# Patient Record
Sex: FEMALE | Race: WHITE | NOT HISPANIC OR LATINO | Employment: OTHER | URBAN - METROPOLITAN AREA
[De-identification: names, ages, dates, MRNs, and addresses within clinical notes are randomized per-mention and may not be internally consistent; named-entity substitution may affect disease eponyms.]

---

## 2024-02-21 ENCOUNTER — APPOINTMENT (OUTPATIENT)
Dept: PRIMARY CARE | Facility: CLINIC | Age: 74
End: 2024-02-21
Payer: COMMERCIAL

## 2024-10-31 ENCOUNTER — APPOINTMENT (OUTPATIENT)
Dept: NEUROLOGY | Facility: CLINIC | Age: 74
End: 2024-10-31
Payer: COMMERCIAL

## 2025-01-19 ENCOUNTER — APPOINTMENT (OUTPATIENT)
Dept: CARDIOLOGY | Facility: HOSPITAL | Age: 75
DRG: 234 | End: 2025-01-19
Payer: MEDICARE

## 2025-01-19 PROCEDURE — 93005 ELECTROCARDIOGRAM TRACING: CPT

## 2025-01-19 PROCEDURE — 99285 EMERGENCY DEPT VISIT HI MDM: CPT | Performed by: STUDENT IN AN ORGANIZED HEALTH CARE EDUCATION/TRAINING PROGRAM

## 2025-01-19 ASSESSMENT — PAIN DESCRIPTION - ORIENTATION: ORIENTATION: MID

## 2025-01-19 ASSESSMENT — PAIN DESCRIPTION - FREQUENCY: FREQUENCY: INTERMITTENT

## 2025-01-19 ASSESSMENT — PAIN DESCRIPTION - PAIN TYPE: TYPE: ACUTE PAIN

## 2025-01-19 ASSESSMENT — PAIN DESCRIPTION - LOCATION: LOCATION: CHEST

## 2025-01-19 ASSESSMENT — PAIN - FUNCTIONAL ASSESSMENT: PAIN_FUNCTIONAL_ASSESSMENT: 0-10

## 2025-01-19 ASSESSMENT — COLUMBIA-SUICIDE SEVERITY RATING SCALE - C-SSRS
6. HAVE YOU EVER DONE ANYTHING, STARTED TO DO ANYTHING, OR PREPARED TO DO ANYTHING TO END YOUR LIFE?: NO
2. HAVE YOU ACTUALLY HAD ANY THOUGHTS OF KILLING YOURSELF?: NO
1. IN THE PAST MONTH, HAVE YOU WISHED YOU WERE DEAD OR WISHED YOU COULD GO TO SLEEP AND NOT WAKE UP?: NO

## 2025-01-19 ASSESSMENT — PAIN SCALES - GENERAL: PAINLEVEL_OUTOF10: 7

## 2025-01-19 ASSESSMENT — PAIN DESCRIPTION - DESCRIPTORS: DESCRIPTORS: DISCOMFORT

## 2025-01-20 ENCOUNTER — APPOINTMENT (OUTPATIENT)
Dept: CARDIOLOGY | Facility: HOSPITAL | Age: 75
DRG: 234 | End: 2025-01-20
Payer: MEDICARE

## 2025-01-20 ENCOUNTER — APPOINTMENT (OUTPATIENT)
Dept: RADIOLOGY | Facility: HOSPITAL | Age: 75
DRG: 234 | End: 2025-01-20
Payer: MEDICARE

## 2025-01-20 ENCOUNTER — HOSPITAL ENCOUNTER (INPATIENT)
Facility: HOSPITAL | Age: 75
DRG: 234 | End: 2025-01-20
Attending: STUDENT IN AN ORGANIZED HEALTH CARE EDUCATION/TRAINING PROGRAM | Admitting: INTERNAL MEDICINE
Payer: MEDICARE

## 2025-01-20 DIAGNOSIS — I25.110 ATHEROSCLEROTIC HEART DISEASE OF NATIVE CORONARY ARTERY WITH UNSTABLE ANGINA PECTORIS: ICD-10-CM

## 2025-01-20 DIAGNOSIS — I25.84 CORONARY ATHEROSCLEROSIS DUE TO CALCIFIED CORONARY LESION (CODE): ICD-10-CM

## 2025-01-20 DIAGNOSIS — I74.2 EMBOLISM AND THROMBOSIS OF ARTERIES OF THE UPPER EXTREMITIES (MULTI): ICD-10-CM

## 2025-01-20 DIAGNOSIS — I25.701 ATHEROSCLEROSIS OF CORONARY ARTERY BYPASS GRAFT(S), UNSPECIFIED, WITH ANGINA PECTORIS WITH DOCUMENTED SPASM (CMS-HCC): ICD-10-CM

## 2025-01-20 DIAGNOSIS — I24.9 ACS (ACUTE CORONARY SYNDROME) (MULTI): ICD-10-CM

## 2025-01-20 DIAGNOSIS — I21.4 NSTEMI (NON-ST ELEVATED MYOCARDIAL INFARCTION) (MULTI): ICD-10-CM

## 2025-01-20 DIAGNOSIS — Z01.810 ENCOUNTER FOR PREPROCEDURAL CARDIOVASCULAR EXAMINATION: ICD-10-CM

## 2025-01-20 DIAGNOSIS — I65.23 OCCLUSION AND STENOSIS OF BILATERAL CAROTID ARTERIES: ICD-10-CM

## 2025-01-20 DIAGNOSIS — R07.89 OTHER CHEST PAIN: ICD-10-CM

## 2025-01-20 DIAGNOSIS — Z95.1 S/P CABG X 4: Primary | ICD-10-CM

## 2025-01-20 DIAGNOSIS — Z01.818 ENCOUNTER FOR OTHER PREPROCEDURAL EXAMINATION: ICD-10-CM

## 2025-01-20 DIAGNOSIS — R07.9 CHEST PAIN, UNSPECIFIED TYPE: ICD-10-CM

## 2025-01-20 LAB
ALBUMIN SERPL BCP-MCNC: 4.4 G/DL (ref 3.4–5)
ALP SERPL-CCNC: 89 U/L (ref 33–136)
ALT SERPL W P-5'-P-CCNC: 16 U/L (ref 7–45)
ANION GAP SERPL CALC-SCNC: 12 MMOL/L (ref 10–20)
AORTIC VALVE MEAN GRADIENT: 8 MMHG
AORTIC VALVE PEAK VELOCITY: 1.74 M/S
APTT PPP: 33 SECONDS (ref 27–38)
AST SERPL W P-5'-P-CCNC: 17 U/L (ref 9–39)
ATRIAL RATE: 80 BPM
AV PEAK GRADIENT: 12 MMHG
AVA (PEAK VEL): 1.97 CM2
AVA (VTI): 2.03 CM2
BASOPHILS # BLD AUTO: 0.02 X10*3/UL (ref 0–0.1)
BASOPHILS NFR BLD AUTO: 0.2 %
BILIRUB SERPL-MCNC: 0.2 MG/DL (ref 0–1.2)
BUN SERPL-MCNC: 21 MG/DL (ref 6–23)
CALCIUM SERPL-MCNC: 9.9 MG/DL (ref 8.6–10.3)
CARDIAC TROPONIN I PNL SERPL HS: 16 NG/L (ref 0–13)
CARDIAC TROPONIN I PNL SERPL HS: 16 NG/L (ref 0–13)
CARDIAC TROPONIN I PNL SERPL HS: 32 NG/L (ref 0–13)
CHLORIDE SERPL-SCNC: 103 MMOL/L (ref 98–107)
CHOLEST SERPL-MCNC: 398 MG/DL (ref 0–199)
CHOLESTEROL/HDL RATIO: 6.4
CO2 SERPL-SCNC: 27 MMOL/L (ref 21–32)
CREAT SERPL-MCNC: 0.86 MG/DL (ref 0.5–1.05)
D DIMER PPP FEU-MCNC: 717 NG/ML FEU
EGFRCR SERPLBLD CKD-EPI 2021: 72 ML/MIN/1.73M*2
EJECTION FRACTION APICAL 4 CHAMBER: 55.7
EJECTION FRACTION: 58 %
EOSINOPHIL # BLD AUTO: 0.16 X10*3/UL (ref 0–0.4)
EOSINOPHIL NFR BLD AUTO: 1.5 %
ERYTHROCYTE [DISTWIDTH] IN BLOOD BY AUTOMATED COUNT: 13.7 % (ref 11.5–14.5)
EST. AVERAGE GLUCOSE BLD GHB EST-MCNC: 120 MG/DL
GLUCOSE SERPL-MCNC: 118 MG/DL (ref 74–99)
HBA1C MFR BLD: 5.8 %
HCT VFR BLD AUTO: 47 % (ref 36–46)
HDLC SERPL-MCNC: 62.3 MG/DL
HGB BLD-MCNC: 15.2 G/DL (ref 12–16)
IMM GRANULOCYTES # BLD AUTO: 0.02 X10*3/UL (ref 0–0.5)
IMM GRANULOCYTES NFR BLD AUTO: 0.2 % (ref 0–0.9)
INR PPP: 1 (ref 0.9–1.1)
LDLC SERPL CALC-MCNC: 285 MG/DL
LEFT ATRIUM VOLUME AREA LENGTH INDEX BSA: 22.1 ML/M2
LEFT VENTRICLE INTERNAL DIMENSION DIASTOLE: 4 CM (ref 3.5–6)
LEFT VENTRICULAR OUTFLOW TRACT DIAMETER: 2 CM
LYMPHOCYTES # BLD AUTO: 4.18 X10*3/UL (ref 0.8–3)
LYMPHOCYTES NFR BLD AUTO: 38.3 %
MAGNESIUM SERPL-MCNC: 2.12 MG/DL (ref 1.6–2.4)
MCH RBC QN AUTO: 29.1 PG (ref 26–34)
MCHC RBC AUTO-ENTMCNC: 32.3 G/DL (ref 32–36)
MCV RBC AUTO: 90 FL (ref 80–100)
MITRAL VALVE E/A RATIO: 1.07
MONOCYTES # BLD AUTO: 0.76 X10*3/UL (ref 0.05–0.8)
MONOCYTES NFR BLD AUTO: 7 %
NEUTROPHILS # BLD AUTO: 5.77 X10*3/UL (ref 1.6–5.5)
NEUTROPHILS NFR BLD AUTO: 52.8 %
NON HDL CHOLESTEROL: 336 MG/DL (ref 0–149)
NRBC BLD-RTO: 0 /100 WBCS (ref 0–0)
P AXIS: 56 DEGREES
P OFFSET: 191 MS
P ONSET: 123 MS
PLATELET # BLD AUTO: 320 X10*3/UL (ref 150–450)
POTASSIUM SERPL-SCNC: 4.2 MMOL/L (ref 3.5–5.3)
PR INTERVAL: 190 MS
PROT SERPL-MCNC: 8.3 G/DL (ref 6.4–8.2)
PROTHROMBIN TIME: 11.3 SECONDS (ref 9.8–12.8)
Q ONSET: 218 MS
QRS COUNT: 13 BEATS
QRS DURATION: 80 MS
QT INTERVAL: 362 MS
QTC CALCULATION(BAZETT): 417 MS
QTC FREDERICIA: 398 MS
R AXIS: 15 DEGREES
RBC # BLD AUTO: 5.23 X10*6/UL (ref 4–5.2)
RIGHT VENTRICLE FREE WALL PEAK S': 11.3 CM/S
RIGHT VENTRICLE PEAK SYSTOLIC PRESSURE: 11.3 MMHG
SODIUM SERPL-SCNC: 138 MMOL/L (ref 136–145)
T AXIS: 39 DEGREES
T OFFSET: 399 MS
TRICUSPID ANNULAR PLANE SYSTOLIC EXCURSION: 2.3 CM
TRIGL SERPL-MCNC: 253 MG/DL (ref 0–149)
TSH SERPL-ACNC: 7.9 MIU/L (ref 0.44–3.98)
VENTRICULAR RATE: 80 BPM
VLDL: 51 MG/DL (ref 0–40)
WBC # BLD AUTO: 10.9 X10*3/UL (ref 4.4–11.3)

## 2025-01-20 PROCEDURE — 85610 PROTHROMBIN TIME: CPT | Performed by: STUDENT IN AN ORGANIZED HEALTH CARE EDUCATION/TRAINING PROGRAM

## 2025-01-20 PROCEDURE — 71045 X-RAY EXAM CHEST 1 VIEW: CPT | Mod: FOREIGN READ | Performed by: RADIOLOGY

## 2025-01-20 PROCEDURE — 99222 1ST HOSP IP/OBS MODERATE 55: CPT | Performed by: NURSE PRACTITIONER

## 2025-01-20 PROCEDURE — 71275 CT ANGIOGRAPHY CHEST: CPT | Mod: FOREIGN READ | Performed by: RADIOLOGY

## 2025-01-20 PROCEDURE — 85379 FIBRIN DEGRADATION QUANT: CPT | Performed by: STUDENT IN AN ORGANIZED HEALTH CARE EDUCATION/TRAINING PROGRAM

## 2025-01-20 PROCEDURE — 71275 CT ANGIOGRAPHY CHEST: CPT

## 2025-01-20 PROCEDURE — 84484 ASSAY OF TROPONIN QUANT: CPT | Performed by: NURSE PRACTITIONER

## 2025-01-20 PROCEDURE — 84075 ASSAY ALKALINE PHOSPHATASE: CPT | Performed by: STUDENT IN AN ORGANIZED HEALTH CARE EDUCATION/TRAINING PROGRAM

## 2025-01-20 PROCEDURE — 84443 ASSAY THYROID STIM HORMONE: CPT | Performed by: NURSE PRACTITIONER

## 2025-01-20 PROCEDURE — 84484 ASSAY OF TROPONIN QUANT: CPT | Performed by: STUDENT IN AN ORGANIZED HEALTH CARE EDUCATION/TRAINING PROGRAM

## 2025-01-20 PROCEDURE — 93306 TTE W/DOPPLER COMPLETE: CPT

## 2025-01-20 PROCEDURE — G0378 HOSPITAL OBSERVATION PER HR: HCPCS

## 2025-01-20 PROCEDURE — 93005 ELECTROCARDIOGRAM TRACING: CPT

## 2025-01-20 PROCEDURE — 2500000001 HC RX 250 WO HCPCS SELF ADMINISTERED DRUGS (ALT 637 FOR MEDICARE OP): Performed by: NURSE PRACTITIONER

## 2025-01-20 PROCEDURE — 80061 LIPID PANEL: CPT | Performed by: NURSE PRACTITIONER

## 2025-01-20 PROCEDURE — 36415 COLL VENOUS BLD VENIPUNCTURE: CPT | Performed by: STUDENT IN AN ORGANIZED HEALTH CARE EDUCATION/TRAINING PROGRAM

## 2025-01-20 PROCEDURE — 85025 COMPLETE CBC W/AUTO DIFF WBC: CPT | Performed by: STUDENT IN AN ORGANIZED HEALTH CARE EDUCATION/TRAINING PROGRAM

## 2025-01-20 PROCEDURE — 2550000001 HC RX 255 CONTRASTS: Performed by: STUDENT IN AN ORGANIZED HEALTH CARE EDUCATION/TRAINING PROGRAM

## 2025-01-20 PROCEDURE — 2500000001 HC RX 250 WO HCPCS SELF ADMINISTERED DRUGS (ALT 637 FOR MEDICARE OP): Performed by: INTERNAL MEDICINE

## 2025-01-20 PROCEDURE — 83735 ASSAY OF MAGNESIUM: CPT | Performed by: STUDENT IN AN ORGANIZED HEALTH CARE EDUCATION/TRAINING PROGRAM

## 2025-01-20 PROCEDURE — 83036 HEMOGLOBIN GLYCOSYLATED A1C: CPT | Performed by: NURSE PRACTITIONER

## 2025-01-20 PROCEDURE — 93306 TTE W/DOPPLER COMPLETE: CPT | Performed by: STUDENT IN AN ORGANIZED HEALTH CARE EDUCATION/TRAINING PROGRAM

## 2025-01-20 PROCEDURE — 71045 X-RAY EXAM CHEST 1 VIEW: CPT

## 2025-01-20 RX ORDER — LEVOTHYROXINE SODIUM 50 UG/1
50 TABLET ORAL DAILY
COMMUNITY

## 2025-01-20 RX ORDER — GABAPENTIN 100 MG/1
100 CAPSULE ORAL NIGHTLY
COMMUNITY

## 2025-01-20 RX ORDER — NAPROXEN SODIUM 220 MG/1
324 TABLET, FILM COATED ORAL ONCE
Status: COMPLETED | OUTPATIENT
Start: 2025-01-20 | End: 2025-01-20

## 2025-01-20 RX ORDER — METOPROLOL TARTRATE 50 MG/1
50 TABLET ORAL 2 TIMES DAILY
Status: DISCONTINUED | OUTPATIENT
Start: 2025-01-20 | End: 2025-01-24

## 2025-01-20 RX ORDER — ACETAMINOPHEN 160 MG/5ML
650 SOLUTION ORAL EVERY 4 HOURS PRN
Status: DISCONTINUED | OUTPATIENT
Start: 2025-01-20 | End: 2025-01-24

## 2025-01-20 RX ORDER — NAPROXEN SODIUM 220 MG/1
81 TABLET, FILM COATED ORAL DAILY
Status: DISCONTINUED | OUTPATIENT
Start: 2025-01-21 | End: 2025-01-21

## 2025-01-20 RX ORDER — ACETAMINOPHEN 650 MG/1
650 SUPPOSITORY RECTAL EVERY 4 HOURS PRN
Status: DISCONTINUED | OUTPATIENT
Start: 2025-01-20 | End: 2025-01-24

## 2025-01-20 RX ORDER — LOVASTATIN 40 MG/1
1 TABLET ORAL DAILY
COMMUNITY

## 2025-01-20 RX ORDER — ACETAMINOPHEN 325 MG/1
650 TABLET ORAL EVERY 4 HOURS PRN
Status: DISCONTINUED | OUTPATIENT
Start: 2025-01-20 | End: 2025-01-24

## 2025-01-20 RX ORDER — METOPROLOL TARTRATE 50 MG/1
50 TABLET ORAL 2 TIMES DAILY
COMMUNITY
End: 2025-01-29 | Stop reason: HOSPADM

## 2025-01-20 RX ORDER — ENOXAPARIN SODIUM 100 MG/ML
40 INJECTION SUBCUTANEOUS EVERY 24 HOURS
Status: DISCONTINUED | OUTPATIENT
Start: 2025-01-20 | End: 2025-01-23

## 2025-01-20 RX ADMIN — METOPROLOL TARTRATE 50 MG: 50 TABLET, FILM COATED ORAL at 11:09

## 2025-01-20 RX ADMIN — ASPIRIN 81 MG CHEWABLE TABLET 324 MG: 81 TABLET CHEWABLE at 09:48

## 2025-01-20 RX ADMIN — IOHEXOL 75 ML: 350 INJECTION, SOLUTION INTRAVENOUS at 03:32

## 2025-01-20 RX ADMIN — METOPROLOL TARTRATE 50 MG: 50 TABLET, FILM COATED ORAL at 20:34

## 2025-01-20 SDOH — SOCIAL STABILITY: SOCIAL INSECURITY: WITHIN THE LAST YEAR, HAVE YOU BEEN HUMILIATED OR EMOTIONALLY ABUSED IN OTHER WAYS BY YOUR PARTNER OR EX-PARTNER?: NO

## 2025-01-20 SDOH — SOCIAL STABILITY: SOCIAL INSECURITY: HAVE YOU HAD ANY THOUGHTS OF HARMING ANYONE ELSE?: NO

## 2025-01-20 SDOH — SOCIAL STABILITY: SOCIAL INSECURITY: ABUSE: ADULT

## 2025-01-20 SDOH — SOCIAL STABILITY: SOCIAL INSECURITY
WITHIN THE LAST YEAR, HAVE YOU BEEN RAPED OR FORCED TO HAVE ANY KIND OF SEXUAL ACTIVITY BY YOUR PARTNER OR EX-PARTNER?: NO

## 2025-01-20 SDOH — SOCIAL STABILITY: SOCIAL INSECURITY: DOES ANYONE TRY TO KEEP YOU FROM HAVING/CONTACTING OTHER FRIENDS OR DOING THINGS OUTSIDE YOUR HOME?: NO

## 2025-01-20 SDOH — SOCIAL STABILITY: SOCIAL INSECURITY
WITHIN THE LAST YEAR, HAVE YOU BEEN KICKED, HIT, SLAPPED, OR OTHERWISE PHYSICALLY HURT BY YOUR PARTNER OR EX-PARTNER?: NO

## 2025-01-20 SDOH — SOCIAL STABILITY: SOCIAL INSECURITY: DO YOU FEEL UNSAFE GOING BACK TO THE PLACE WHERE YOU ARE LIVING?: NO

## 2025-01-20 SDOH — SOCIAL STABILITY: SOCIAL INSECURITY: ARE THERE ANY APPARENT SIGNS OF INJURIES/BEHAVIORS THAT COULD BE RELATED TO ABUSE/NEGLECT?: NO

## 2025-01-20 SDOH — SOCIAL STABILITY: SOCIAL INSECURITY: ARE YOU OR HAVE YOU BEEN THREATENED OR ABUSED PHYSICALLY, EMOTIONALLY, OR SEXUALLY BY ANYONE?: NO

## 2025-01-20 SDOH — ECONOMIC STABILITY: INCOME INSECURITY: IN THE PAST 12 MONTHS HAS THE ELECTRIC, GAS, OIL, OR WATER COMPANY THREATENED TO SHUT OFF SERVICES IN YOUR HOME?: NO

## 2025-01-20 SDOH — SOCIAL STABILITY: SOCIAL INSECURITY: WERE YOU ABLE TO COMPLETE ALL THE BEHAVIORAL HEALTH SCREENINGS?: YES

## 2025-01-20 SDOH — ECONOMIC STABILITY: FOOD INSECURITY: WITHIN THE PAST 12 MONTHS, THE FOOD YOU BOUGHT JUST DIDN'T LAST AND YOU DIDN'T HAVE MONEY TO GET MORE.: NEVER TRUE

## 2025-01-20 SDOH — SOCIAL STABILITY: SOCIAL INSECURITY: HAS ANYONE EVER THREATENED TO HURT YOUR FAMILY OR YOUR PETS?: NO

## 2025-01-20 SDOH — SOCIAL STABILITY: SOCIAL INSECURITY: HAVE YOU HAD THOUGHTS OF HARMING ANYONE ELSE?: NO

## 2025-01-20 SDOH — SOCIAL STABILITY: SOCIAL INSECURITY: WITHIN THE LAST YEAR, HAVE YOU BEEN AFRAID OF YOUR PARTNER OR EX-PARTNER?: NO

## 2025-01-20 SDOH — ECONOMIC STABILITY: FOOD INSECURITY: WITHIN THE PAST 12 MONTHS, YOU WORRIED THAT YOUR FOOD WOULD RUN OUT BEFORE YOU GOT THE MONEY TO BUY MORE.: NEVER TRUE

## 2025-01-20 SDOH — SOCIAL STABILITY: SOCIAL INSECURITY: DO YOU FEEL ANYONE HAS EXPLOITED OR TAKEN ADVANTAGE OF YOU FINANCIALLY OR OF YOUR PERSONAL PROPERTY?: NO

## 2025-01-20 ASSESSMENT — COGNITIVE AND FUNCTIONAL STATUS - GENERAL
MOBILITY SCORE: 24
DAILY ACTIVITIY SCORE: 24
MOBILITY SCORE: 24
PATIENT BASELINE BEDBOUND: NO
DAILY ACTIVITIY SCORE: 24

## 2025-01-20 ASSESSMENT — ACTIVITIES OF DAILY LIVING (ADL)
LACK_OF_TRANSPORTATION: NO
JUDGMENT_ADEQUATE_SAFELY_COMPLETE_DAILY_ACTIVITIES: YES
WALKS IN HOME: INDEPENDENT
DRESSING YOURSELF: INDEPENDENT
TOILETING: INDEPENDENT
BATHING: INDEPENDENT
PATIENT'S MEMORY ADEQUATE TO SAFELY COMPLETE DAILY ACTIVITIES?: YES
HEARING - LEFT EAR: FUNCTIONAL
FEEDING YOURSELF: INDEPENDENT
GROOMING: INDEPENDENT
HEARING - RIGHT EAR: FUNCTIONAL
ADEQUATE_TO_COMPLETE_ADL: YES

## 2025-01-20 ASSESSMENT — LIFESTYLE VARIABLES
SKIP TO QUESTIONS 9-10: 1
SUBSTANCE_ABUSE_PAST_12_MONTHS: NO
HOW OFTEN DO YOU HAVE A DRINK CONTAINING ALCOHOL: NEVER
AUDIT-C TOTAL SCORE: 0
PRESCIPTION_ABUSE_PAST_12_MONTHS: NO
HOW MANY STANDARD DRINKS CONTAINING ALCOHOL DO YOU HAVE ON A TYPICAL DAY: PATIENT DOES NOT DRINK
AUDIT-C TOTAL SCORE: 0
HOW OFTEN DO YOU HAVE 6 OR MORE DRINKS ON ONE OCCASION: NEVER

## 2025-01-20 ASSESSMENT — COLUMBIA-SUICIDE SEVERITY RATING SCALE - C-SSRS
1. IN THE PAST MONTH, HAVE YOU WISHED YOU WERE DEAD OR WISHED YOU COULD GO TO SLEEP AND NOT WAKE UP?: NO
2. HAVE YOU ACTUALLY HAD ANY THOUGHTS OF KILLING YOURSELF?: NO
6. HAVE YOU EVER DONE ANYTHING, STARTED TO DO ANYTHING, OR PREPARED TO DO ANYTHING TO END YOUR LIFE?: NO

## 2025-01-20 ASSESSMENT — PATIENT HEALTH QUESTIONNAIRE - PHQ9
1. LITTLE INTEREST OR PLEASURE IN DOING THINGS: NOT AT ALL
SUM OF ALL RESPONSES TO PHQ9 QUESTIONS 1 & 2: 0
2. FEELING DOWN, DEPRESSED OR HOPELESS: NOT AT ALL

## 2025-01-20 ASSESSMENT — PAIN SCALES - GENERAL
PAINLEVEL_OUTOF10: 0 - NO PAIN
PAINLEVEL_OUTOF10: 0 - NO PAIN

## 2025-01-20 ASSESSMENT — PAIN - FUNCTIONAL ASSESSMENT: PAIN_FUNCTIONAL_ASSESSMENT: 0-10

## 2025-01-20 NOTE — ED PROVIDER NOTES
EMERGENCY DEPARTMENT ENCOUNTER      Pt Name: Jessy Garcia  MRN: [id number]  Birthdate 1950  Date of evaluation: 1/19/2025  Provider: Stoney Woodard DO    CHIEF COMPLAINT       Chief Complaint   Patient presents with    Hypertension    chest discomfort       HISTORY OF PRESENT ILLNESS    Jessy Garcia is a 74 y.o. female who presents to the emergency department with Family member for chest pain with deep inhalation.  Patient states this started approximately 4 days ago.  She states that she was outside shoveling snow when she entered the house and sat down she noted centralized chest stabbing sensation with deep inhalation.  Patient states this has occurred up to 4 times since.  This does appear to be consistent with exertion.  She is currently pain-free and feels well.  She is otherwise been in her usual state of health no flulike symptoms or fevers.          Nursing Notes were reviewed.    REVIEW OF SYSTEMS     CONSTITUTIONAL: Denies fever, sweats, chills.   NEURO: Denies difficulty walking, numbness, weakness, tingling, headache.   HEENT: Denies sore throat, rhinorrhea, changes in vision.   CARDIO: Endorses chest pain.  Denies palpitations.  PULM: Denies shortness of breath, cough.   GI: Denies abdominal pain, nausea, vomiting, diarrhea, constipation, melena, hematochezia.  : Denies painful urination, frequency, hematuria.   MSK: Denies recent trauma.   SKIN: Denies rash, lesions.   ENDOCRINE: Denies unexpected weight-loss.   HEME: Denies bleeding disorder.     PAST MEDICAL HISTORY   No past medical history on file.  Hypothyroidism, hypertension, hyperlipidemia, PVD  SURGICAL HISTORY       Past Surgical History:   Procedure Laterality Date    OTHER SURGICAL HISTORY  03/01/2017    Endarterectomy Carotid Artery       ALLERGIES     Atorvastatin calcium and Rosuvastatin calcium    FAMILY HISTORY     No family history on file.     SOCIAL HISTORY       Social History     Socioeconomic History    Marital  status: Unknown     Social Drivers of Health     Financial Resource Strain: Low Risk  (8/11/2022)    Received from [facility]    Overall Financial Resource Strain (CARDIA)     Difficulty of Paying Living Expenses: Not very hard   Food Insecurity: Unknown (8/11/2022)    Received from [facility]    Hunger Vital Sign     Worried About Running Out of Food in the Last Year: Patient declined     Ran Out of Food in the Last Year: Never true   Transportation Needs: No Transportation Needs (8/11/2022)    Received from [facility]    PRAPARE - Transportation     Lack of Transportation (Medical): No     Lack of Transportation (Non-Medical): No   Physical Activity: Sufficiently Active (8/11/2022)    Received from [facility]    Exercise Vital Sign     Days of Exercise per Week: 5 days     Minutes of Exercise per Session: 60 min   Stress: Stress Concern Present (8/11/2022)    Received from [facility]    [place] [place] [employer] - Occupational Stress Questionnaire     Feeling of Stress : To some extent   Social Connections: Socially Isolated (8/11/2022)    Received from [facility]    Social Connection and Isolation Panel [NHANES]     Frequency of Communication with Friends and Family: Three times a week     Frequency of Social Gatherings with Friends and Family: Patient declined     Attends Voodoo Services: Never     Active Member of Clubs or Organizations: No     Attends Club or Organization Meetings: Patient declined     Marital Status:    Housing Stability: Unknown (8/11/2022)    Received from [facility]    Housing Stability Vital Sign     Unable to Pay for Housing in the Last Year: No     Unstable Housing in the Last Year: No       PHYSICAL EXAM   VS: As documented in the triage note from today's date and EMR flowsheet were reviewed.  Gen: Well developed. No acute distress. Seated in bed. Appears nontoxic.  Alert and oriented to person time place.  Skin: Warm.  Dry. Intact. No rashes or lesions.  Eyes: Pupils equally round and reactive to light. Clear sclera.  HENT: Atraumatic appearance. Mucosal membranes moist. No oral lesions, uvula midline, airway patent.   CV: Regular rate and regular rhythm. S1, S2. No pedal edema. Warm extremities.  Resp: Nonlabored breathing Clear to auscultation bilaterally. No increased work of breathing.   GI: Soft and nontender. No rebound or guarding. Bowel sounds x4 present.   MSK: Symmetric muscle bulk. No joint swelling in the extremities. Compartments are soft. Neurovascularly intact x4 extremities. Radial pulses +2 equal bilaterally.  Pedal pulses +2 equal bilaterally.  Neuro: Alert. Speech fluent. Moving all extremities. No focal deficits. Gait normal.  Psych: Appropriate. Kempt.    DIAGNOSTIC RESULTS   RADIOLOGY:   Non-plain film images such as CT, Ultrasound and MRI are read by the radiologist. Plain radiographic images are visualized and preliminarily interpreted by the emergency physician with the below findings: Chest x-ray no widened mediastinum no cardiomegaly.      Interpretation per the Radiologist below, if available at the time of this note:    CT angio chest for pulmonary embolism   Final Result   1.No CT evidence of pulmonary embolism.   2.Normal aorta.   3.Small pulmonary nodule in the right upper lobe measuring 0.4   cm. As an isolated phenomena likely of little significance but   correlate with any malignancy history.   4.No evidence of pulmonary artery filling defect to suggest a   pulmonary embolism.   5.No evidence of aortic dissection or aneurysmal dilatation.   6.Small distal esophageal hiatal hernia.   7.Atherosclerotic changes of the abdominal aorta and its   branches. There is some narrowing of the proximal celiac axis   estimated be approximately 50-60%.   Signed by Albino Ritchie MD      XR chest 1 view   Final Result   No acute process.   Signed by Jo Veras MD            ED BEDSIDE ULTRASOUND:    Performed by ED Physician - none    LABS:  Labs Reviewed   CBC WITH AUTO DIFFERENTIAL - Abnormal       Result Value    WBC 10.9      nRBC 0.0      RBC 5.23 (*)     Hemoglobin 15.2      Hematocrit 47.0 (*)     MCV 90      MCH 29.1      MCHC 32.3      RDW 13.7      Platelets 320      Neutrophils % 52.8      Immature Granulocytes %, Automated 0.2      Lymphocytes % 38.3      Monocytes % 7.0      Eosinophils % 1.5      Basophils % 0.2      Neutrophils Absolute 5.77 (*)     Immature Granulocytes Absolute, Automated 0.02      Lymphocytes Absolute 4.18 (*)     Monocytes Absolute 0.76      Eosinophils Absolute 0.16      Basophils Absolute 0.02     COMPREHENSIVE METABOLIC PANEL - Abnormal    Glucose 118 (*)     Sodium 138      Potassium 4.2      Chloride 103      Bicarbonate 27      Anion Gap 12      Urea Nitrogen 21      Creatinine 0.86      eGFR 72      Calcium 9.9      Albumin 4.4      Alkaline Phosphatase 89      Total Protein 8.3 (*)     AST 17      Bilirubin, Total 0.2      ALT 16     SERIAL TROPONIN-INITIAL - Abnormal    Troponin I, High Sensitivity 16 (*)     Narrative:     Less than 99th percentile of normal range cutoff-  Female and children under 18 years old <14 ng/L; Male <21 ng/L: Negative  Repeat testing should be performed if clinically indicated.     Female and children under 18 years old 14-50 ng/L; Male 21-50 ng/L:  Consistent with possible cardiac damage and possible increased clinical   risk. Serial measurements may help to assess extent of myocardial damage.     >50 ng/L: Consistent with cardiac damage, increased clinical risk and  myocardial infarction. Serial measurements may help assess extent of   myocardial damage.      NOTE: Children less than 1 year old may have higher baseline troponin   levels and results should be interpreted in conjunction with the overall   clinical context.     NOTE: Troponin I testing is performed using a different   testing methodology at [facility] than  at [facility]. Direct result comparisons should only   be made within the same method.   SERIAL TROPONIN, 1 HOUR - Abnormal    Troponin I, High Sensitivity 16 (*)     Narrative:     Less than 99th percentile of normal range cutoff-  Female and children under 18 years old <14 ng/L; Male <21 ng/L: Negative  Repeat testing should be performed if clinically indicated.     Female and children under 18 years old 14-50 ng/L; Male 21-50 ng/L:  Consistent with possible cardiac damage and possible increased clinical   risk. Serial measurements may help to assess extent of myocardial damage.     >50 ng/L: Consistent with cardiac damage, increased clinical risk and  myocardial infarction. Serial measurements may help assess extent of   myocardial damage.      NOTE: Children less than 1 year old may have higher baseline troponin   levels and results should be interpreted in conjunction with the overall   clinical context.     NOTE: Troponin I testing is performed using a different   testing methodology at [facility] than at [facility]. Direct result comparisons should only   be made within the same method.   D-DIMER, VTE EXCLUSION - Abnormal    D-Dimer, Quantitative VTE Exclusion 717 (*)     Narrative:     The VTE Exclusion D-Dimer assay is reported in ng/mL Fibrinogen Equivalent Units (FEU).    Per 's instructions for use, a value of less than 500 ng/mL (FEU) may help to exclude DVT or PE in outpatients when the assay is used with a clinical pretest probability assessment.(AE must utilize and document eCalc 'Wells Score Deep Vein Thrombosis Risk' for DVT exclusion only. Emergency Department should utilize  Guidelines for Emergency Department Use of the VTE Exclusion D-Dimer and Clinical Pretest probability assessment model for DVT or PE exclusion.)   MAGNESIUM - Normal    Magnesium 2.12     COAGULATION SCREEN - Normal    Protime 11.3      INR 1.0      aPTT 33       Narrative:     The APTT is no longer used for monitoring Unfractionated Heparin Therapy. For monitoring Heparin Therapy, use the Heparin Assay.   TROPONIN SERIES- (INITIAL, 1 HR)    Narrative:     The following orders were created for panel order Troponin I Series, High Sensitivity (0, 1 HR).  Procedure                               Abnormality         Status                     ---------                               -----------         ------                     Troponin I, High Sensiti...[[id number]]  Abnormal            Final result               Troponin, High Sensitivi...[[id number]]  Abnormal            Final result                 Please view results for these tests on the individual orders.       All other labs were within normal range or not returned as of this dictation.    EMERGENCY DEPARTMENT COURSE/MDM:   Vitals:    Vitals:    01/19/25 1925 01/20/25 0136 01/20/25 0244 01/20/25 0525   BP: (!) 198/91 (!) 179/93 139/63 142/63   BP Location: Right arm Right arm Left arm Left arm   Patient Position: Sitting Sitting Lying Lying   Pulse: 82 72 68 67   Resp: 16 18 17 16   Temp: 36.2 °C (97.2 °F) 36 °C (96.8 °F)  36.2 °C (97.2 °F)   TempSrc: Temporal Temporal  Temporal   SpO2: 97% 97% 97% (!) 93%   Weight: 89.7 kg (197 lb 12 oz)      Height: 1.524 m (5')          I reviewed the patient's triage vitals and they are hypertensive recommended follow-up with primary physician for repeat checks.    Due to the above findings the following was ordered cardiac workup to include D-dimer.    Lab work reveals no anemia no leukocytosis making factious etiology less likely.  Renal function is appropriate no significant electrolyte derangements.  D-dimer is elevated therefore CTA is pursued.  Cardiac troponin x 2 slightly elevated no significant delta change EKG with nonspecific changes new progressing to right bundle branch block.  CTA shows no evidence of PE multiple incidental findings thoroughly discussed with the patient  recommended close outpatient follow-up.  She is agreeable with hospital admission for further restratification.  I spoke with the admitting physician who is agreed to accept the patient he did take over care at time of admission order.  Patient is appreciative of care agreeable with plan.    HEART Score:    History:   [ x ] Slightly suspicious - 0   [  ] Moderately suspicious - 1  [  ] Highly suspicious - 2     EKG:   [  ] Normal - 0    [ x ] Non-specific repolarization disturbance (No ST changes, but LBBB, LVH, repolarization changes)  - 1   [  ] Significant ST deviation (ST changes not d/t LBBB, LVH, digoxin)  - 2     Age:   [  ] < 45 - 0   [  ] 45-64 - 1   [ x ] > 65 - 2     Risk Factors:     HTN, HLD, DM, obesity (BMI > 30), smoking (current or w/I 3mo), + fmx (before age of 65), CAD, prior MI, PCI/CABG, CVA/TIA, PAD  [  ] No known risk factors - 0   [  ] 1-2 risk factors - 1    [ x ] >3 risk factors or hx of atherosclerotic disease - 2     Initial troponin:  [  ] < normal limit - 0   [ x ] 1 - 3x normal limit - 1   [  ] > 3x normal limit - 2    Total:   [ ] 0-3 Points: 1.7% risk of major adverse cardiac event in 6 weeks  [ X ] 4-6 Points: 12-16.6% risk of major adverse cardiac event in 6 weeks  [ ] 7-10 Points 50-65% risk of major adverse cardiac event in 6 weeks    ED Course as of 01/20/25 0645   Mon Jan 20, 2025   0133 Interpreted by the Emergency Department Attending: ECG revealed normal sinus rhythm at a rate of 80 beats per minute with NJ interval 190 , QRS of 80 , QTc of 417.  No acute injury pattern. Previous EKG on April 18 revealed nonspecific changes.    [MG]      ED Course User Index  [MG] Stoney Woodard DO         Diagnoses as of 01/20/25 0645   Chest pain, unspecified type       Patient was counseled regarding labs, imaging, likely diagnosis, and plan. All questions were answered.     ------------------------------------------------------------------  Information provided by the patient and  friend  Consults hospitalist  Past medical history complicating workup hypertension, hyperlipidemia  Previous medical records reviewed office visit 8/23/2024  Shared medical decision making patient agreeable with hospital admission for further restratification.  ------------------------------------------------------------------  ED Medications administered this visit:    Medications   iohexol (OMNIPaque) 350 mg iodine/mL solution 75 mL (75 mL intravenous Given 1/20/25 0332)        Final Impression:   1. Chest pain, unspecified type          Stoney Woodard DO    (Please note that portions of this note were completed with a voice recognition program.  Efforts were made to edit the dictations but occasionally words are mis-transcribed.)     Stoney Woodard DO  01/20/25 6639

## 2025-01-20 NOTE — CONSULTS
Cardiology Consult    Impression:  Unstable angina  Uncontrolled hypertension  Uncontrolled hyperlipidemia.  Intolerant to multiple statins.  Obesity  Carotid disease.  Bilateral carotid endarterectomy  Plan:  Aspirin, beta-blocker  PCSK9 inhibitor.  Will initiate as outpatient.  Echo for EF  Cardiac cath.  Discussed risk, benefits and alternatives.  All questions answered.  CC  Chest pain  HPI:  74-year-old woman presenting to ER for evaluation of chest discomfort.  4 days ago after shoveling snow she felt some burning sensation in the center of her chest.  Lasted for approximately 10 minutes and resolved.  Since that time she has had episodes of intermittent burning in the center of her chest.  Worse with exertion, better with rest.  She checked her blood pressure, it was over 200.  She came to ER.  While getting up for the CAT scan she had some burning chest discomfort.  It resolved spontaneously.  No prior history of similar discomfort.  She tells me she takes her blood pressure pills prescribed that her blood pressure is always high.  She does not take her statin regularly.  Breathing okay.  No palpitations, presyncope or syncope.  No recent illnesses.  EKG shows no ischemic changes.  Troponins not significantly elevated.  Meds:  Scheduled medications  [START ON 1/21/2025] aspirin, 81 mg, oral, Daily  enoxaparin, 40 mg, subcutaneous, q24h  metoprolol tartrate, 50 mg, oral, BID      Continuous medications     PRN medications  PRN medications: acetaminophen **OR** acetaminophen **OR** acetaminophen, HYDROmorphone    PMHx:  As listed above  Social history:  Lives with her daughter.  No cigarettes or alcohol.  Family history:  There is heart disease in her family.  Review of systems:  See HPI  Physical exam:  Vitals:    01/20/25 0949   BP: 135/63   Pulse: 61   Resp: 16   Temp:    SpO2: 96%      JVP not elevated. Carotid upstroke normal. No carotid bruits. Heart sounds normal. No extra sounds or murmurs. Chest  clear. Abdomen soft, nontender. No masses. Peripheral pulses palpable. No edema.  EKG:  Sinus rhythm.  Poor R wave progression.  Echo:  No results found for this or any previous visit.   Labs:  Lab Results   Component Value Date    WBC 10.9 01/20/2025    HGB 15.2 01/20/2025    HCT 47.0 (H) 01/20/2025     01/20/2025    CHOL 398 (H) 01/20/2025    TRIG 253 (H) 01/20/2025    HDL 62.3 01/20/2025    ALT 16 01/20/2025    AST 17 01/20/2025     01/20/2025    K 4.2 01/20/2025     01/20/2025    CREATININE 0.86 01/20/2025    BUN 21 01/20/2025    CO2 27 01/20/2025    TSH 7.90 (H) 01/20/2025    INR 1.0 01/20/2025    HGBA1C 5.7 (H) 08/02/2024     par

## 2025-01-20 NOTE — H&P
"History Of Present Illness  Jessy Garcia is a 74 y.o. female With a past medical history of hypothyroidism, hypertension, hyperlipidemia, prediabetes, carotid stenosis status post bilateral carotid endarterectomy, obesity [place] [facility] [place] of chest pain.  Patient reports the pain initially began approximately 4 days ago after shoveling snow.  She reports the pain radiates across her chest she describes the pain as a \"hurt\"; severity 8/10 at its worst.  She does report the pain is more noticeable with minimal exertion but also states the pain occurs at rest.  She does report some associated shortness of breath, she denies any nausea, vomiting, diaphoresis.  She denies any recent fevers chills, abdominal pain, headache, dizziness.  She reports she is intermittently compliant with her blood pressure and cholesterol medications.    ED course: Afebrile, initially hypertensive at 198/91 improved to 142/63.  Otherwise hemodynamically stable.  Labs significant for glucose 118, initial troponin 16, repeat troponin 16.  D-dimer 717, no leukocytosis, neutrophil count 5.77, no anemia.  CTA chest negative for pulmonary embolism incidental small pulmonary nodule in the right upper lobe at 0.4 cm was found.  Atherosclerotic changes of abdominal aorta, and small hiatal hernia.  Full impression to follow.    10 point review of systems is performed and is negative except for what is stated above    Patient has been admitted to the care of Dr. Flores with consult placed to cardiology for further medical management.     Past Medical History  Hypothyroidism, hypertension, prediabetes, carotid stenosis    Surgical History  Past Surgical History:   Procedure Laterality Date    OTHER SURGICAL HISTORY  03/01/2017    Endarterectomy Carotid Artery        Social History  She has no history on file for tobacco use, alcohol use, and drug use.    Family History  No family history on file.     Allergies  Atorvastatin calcium and " Rosuvastatin calcium         Physical Exam  Constitutional:       Appearance: Normal appearance. She is obese.   HENT:      Head: Atraumatic.      Nose: Nose normal.      Mouth/Throat:      Mouth: Mucous membranes are moist.      Pharynx: Oropharynx is clear.   Eyes:      Extraocular Movements: Extraocular movements intact.      Conjunctiva/sclera: Conjunctivae normal.   Cardiovascular:      Rate and Rhythm: Normal rate and regular rhythm.      Heart sounds: Normal heart sounds.   Pulmonary:      Effort: Pulmonary effort is normal.      Breath sounds: Normal breath sounds.   Abdominal:      General: Bowel sounds are normal.      Palpations: Abdomen is soft.   Musculoskeletal:         General: Normal range of motion.      Cervical back: Normal range of motion.   Skin:     General: Skin is warm and dry.   Neurological:      General: No focal deficit present.      Mental Status: She is alert and oriented to person, place, and time.   Psychiatric:         Mood and Affect: Mood normal.          Last Recorded Vitals  Blood pressure 142/63, pulse 67, temperature 36.2 °C (97.2 °F), temperature source Temporal, resp. rate 16, height 1.524 m (5'), weight 89.7 kg (197 lb 12 oz), SpO2 (!) 93%.    Relevant Results    Scheduled medications  aspirin, 324 mg, oral, Once  [START ON 1/21/2025] aspirin, 81 mg, oral, Daily  enoxaparin, 40 mg, subcutaneous, q24h      Continuous medications     PRN medications  PRN medications: acetaminophen **OR** acetaminophen **OR** acetaminophen, HYDROmorphone    CT angio chest for pulmonary embolism    Result Date: 1/20/2025  STUDY: CT Angiogram of the Chest; 1/20/2025 at 3:34 AM INDICATION: Chest pain. COMPARISON: XR chest 1/20/2025. ACCESSION NUMBER(S): [id number] ORDERING CLINICIAN: RYAN WERNER TECHNIQUE:  CTA of the chest was performed with intravenous contrast. Images are reviewed and processed at a workstation according to the CT angiogram protocol with 3-D and/or MIP post processing  imaging generated.  Omnipaque 350 75 mL was administered intravenously. Automated mA/kV exposure control was utilized and patient examination was performed in strict accordance with principles of ALARA. FINDINGS: The root of the neck is normal. The thyroid glands are symmetric. There is no evidence of significant mediastinal or hilar adenopathy here. Heart size is normal -----at most some minimal left ventricular hypertrophy. The aortic confines appear to be normal. No evidence of dissection or aneurysmal dilatation of the aorta. Small distal esophageal hiatal hernia. No pericardial effusion. Coronary artery calcification suspected. The pulmonary arteries are adequately opacified with no evidence of pulmonary artery filling defect to suggest a pulmonary embolism here. Pulmonary veins appear to be normal. No evidence of filling defect within the cardiac chambers. The lung parenchyma is normal. No edema. Basilar atelectasis and lingular scarring changes. Small pulmonary nodule is noted in the right upper lobe measuring 0.4 cm ----as an isolated phenomena---- likely of little significance----- correlate with any malignancy history. There is no effusion. There is no pneumothorax. No other focal nodular opacity identified nor is there any evidence of significant airspace disease apart from the atelectatic and basilar scarring changes. Breast shadows appear to be grossly symmetric---- no size significant axillary adenopathy. The bony confines of the thoracic spine show degenerative changes. The bones are osteopenic. Sternum normal. Bony ribs appear to be normal with no evidence of fracture or healing fracture. Spinous processes normal. Limited view of the liver and gallbladder fossa grossly normal. Gastric confines appear to be normal in their limited visualized extent. What I see of the pancreas grossly normal. The adrenal glands are normal. The upper aspect of the kidneys appear to be normal. The spleen is normal.  Gastrohepatic ligament normal. The abdominal aorta is normal --there are atherosclerotic changes at the level of the celiac axis and the SM---There is  is some narrowing of the proximal celiac axis identified -----estimated be approximately 50-60%. Takeoff of single bilateral renal arteries appear to be normal. No peribowel inflammatory process on limited view of the colon ----no free air or no free fluid.    1.No CT evidence of pulmonary embolism. 2.Normal aorta. 3.Small pulmonary nodule in the right upper lobe measuring 0.4 cm. As an isolated phenomena likely of little significance but correlate with any malignancy history. 4.No evidence of pulmonary artery filling defect to suggest a pulmonary embolism. 5.No evidence of aortic dissection or aneurysmal dilatation. 6.Small distal esophageal hiatal hernia. 7.Atherosclerotic changes of the abdominal aorta and its branches. There is some narrowing of the proximal celiac axis estimated be approximately 50-60%. Signed by Albino Ritchie MD    XR chest 1 view    Result Date: 1/20/2025  STUDY: Chest Radiograph;  1/20/2025  1:57 INDICATION: Chest pain. COMPARISON: None Available ACCESSION NUMBER(S): [id number] ORDERING CLINICIAN: RYAN WERNER TECHNIQUE:  Frontal chest was obtained at 1:57 hours. FINDINGS: CARDIOMEDIASTINAL SILHOUETTE: Cardiomediastinal silhouette is normal in size and configuration.  LUNGS: Lungs are clear.  ABDOMEN: No remarkable upper abdominal findings.  BONES: No acute osseous changes.    No acute process. Signed by Jo Veras MD     Results for orders placed or performed during the hospital encounter of 01/20/25 (from the past 24 hours)   CBC and Auto Differential   Result Value Ref Range    WBC 10.9 4.4 - 11.3 x10*3/uL    nRBC 0.0 0.0 - 0.0 /100 WBCs    RBC 5.23 (H) 4.00 - 5.20 x10*6/uL    Hemoglobin 15.2 12.0 - 16.0 g/dL    Hematocrit 47.0 (H) 36.0 - 46.0 %    MCV 90 80 - 100 fL    MCH 29.1 26.0 - 34.0 pg    MCHC 32.3 32.0 - 36.0 g/dL     RDW 13.7 11.5 - 14.5 %    Platelets 320 150 - 450 x10*3/uL    Neutrophils % 52.8 40.0 - 80.0 %    Immature Granulocytes %, Automated 0.2 0.0 - 0.9 %    Lymphocytes % 38.3 13.0 - 44.0 %    Monocytes % 7.0 2.0 - 10.0 %    Eosinophils % 1.5 0.0 - 6.0 %    Basophils % 0.2 0.0 - 2.0 %    Neutrophils Absolute 5.77 (H) 1.60 - 5.50 x10*3/uL    Immature Granulocytes Absolute, Automated 0.02 0.00 - 0.50 x10*3/uL    Lymphocytes Absolute 4.18 (H) 0.80 - 3.00 x10*3/uL    Monocytes Absolute 0.76 0.05 - 0.80 x10*3/uL    Eosinophils Absolute 0.16 0.00 - 0.40 x10*3/uL    Basophils Absolute 0.02 0.00 - 0.10 x10*3/uL   Comprehensive Metabolic Panel   Result Value Ref Range    Glucose 118 (H) 74 - 99 mg/dL    Sodium 138 136 - 145 mmol/L    Potassium 4.2 3.5 - 5.3 mmol/L    Chloride 103 98 - 107 mmol/L    Bicarbonate 27 21 - 32 mmol/L    Anion Gap 12 10 - 20 mmol/L    Urea Nitrogen 21 6 - 23 mg/dL    Creatinine 0.86 0.50 - 1.05 mg/dL    eGFR 72 >60 mL/min/1.73m*2    Calcium 9.9 8.6 - 10.3 mg/dL    Albumin 4.4 3.4 - 5.0 g/dL    Alkaline Phosphatase 89 33 - 136 U/L    Total Protein 8.3 (H) 6.4 - 8.2 g/dL    AST 17 9 - 39 U/L    Bilirubin, Total 0.2 0.0 - 1.2 mg/dL    ALT 16 7 - 45 U/L   Magnesium   Result Value Ref Range    Magnesium 2.12 1.60 - 2.40 mg/dL   Coagulation Screen   Result Value Ref Range    Protime 11.3 9.8 - 12.8 seconds    INR 1.0 0.9 - 1.1    aPTT 33 27 - 38 seconds   Troponin I, High Sensitivity, Initial   Result Value Ref Range    Troponin I, High Sensitivity 16 (H) 0 - 13 ng/L   D-dimer, VTE Exclusion   Result Value Ref Range    D-Dimer, Quantitative VTE Exclusion 717 (H) <=500 ng/mL FEU   Troponin, High Sensitivity, 1 Hour   Result Value Ref Range    Troponin I, High Sensitivity 16 (H) 0 - 13 ng/L              Assessment/Plan   Assessment & Plan  Chest pain, unspecified type      Jessy Garcia is a 74 y.o. female With a past medical history of hypothyroidism, hypertension, hyperlipidemia, prediabetes, carotid  "stenosis status post bilateral carotid endarterectomy, obesity [place] [facility] [place] of chest pain.  Patient reports the pain initially began approximately 4 days ago after shoveling snow.  She reports the pain radiates across her chest she describes the pain as a \"hurt\"; severity 8/10 at its worst.  She does report the pain is more noticeable with minimal exertion but also states the pain occurs at rest.  She does report some associated shortness of breath, she denies any nausea, vomiting, diaphoresis.  She denies any recent fevers chills, abdominal pain, headache, dizziness.  She reports she is intermittently compliant with her blood pressure and cholesterol medications.     #Chest pain  #Elevated troponin  #Hypertension  Admit to telemetry  Consult cardiology, appreciate input  324 mg aspirin x 1 followed by daily 81 mg aspirin  Hemoglobin A1c, lipid panel, TSH  Echocardiogram  Continue to trend troponin    #Hypothyroidism  #Hyperlipidemia  #Prediabetes  Labs as above  Continue home meds as appropriate once med rec completed    #DVT PPx  Lovenox  SCDs          I spent 30 minutes in the professional and overall care of this patient.      Bianca Ramirez, APRN-CNP    Addendum chest pain angina pectoralis going for a cardiac cath I will follow up with her afterwards face-to-face evaluation complete.  I agree with all the above management and I have reviewed all the meds and the labs as well thank you  "

## 2025-01-20 NOTE — ED TRIAGE NOTES
75 y/o female c/o hypertension and chest discomfort that has been ongoing since she shoveled snow several days ago.

## 2025-01-20 NOTE — PROGRESS NOTES
Pharmacy Medication History Review    Jessy Garcia is a 74 y.o. female admitted for Chest pain, unspecified type. Pharmacy reviewed the patient's [id number] medications and allergies for accuracy.    The list below reflectives the updated PTA list. Please review each medication in order reconciliation for additional clarification and justification.  Prior to Admission medications    Medication Sig Start Date End Date Authorizing Provider   lovastatin (Mevacor) 40 mg tablet Take 1 tablet (40 mg) by mouth once daily.   Historical Provider, MD   gabapentin (Neurontin) 100 mg capsule Take 1 capsule (100 mg) by mouth once daily at bedtime.   Historical Provider, MD   metoprolol tartrate (Lopressor) 50 mg tablet Take 1 tablet by mouth 2 times a day.   Historical Provider, MD   Synthroid 50 mcg tablet Take 1 tablet (50 mcg) by mouth once daily.   Historical Provider, MD        The list below reflectives the updated allergy list. Please review each documented allergy for additional clarification and justification.  Allergies  Reviewed by Janet Riggs on 1/20/2025        Severity Reactions Comments    Atorvastatin Calcium Medium Myalgia     House Dust Mite Medium Itching, Other Congestion     Pollen Extracts Medium Itching, Other Congestion     Rosuvastatin Calcium Medium Myalgia             Below are additional concerns with the patient's PTA list.      Janet Riggs

## 2025-01-20 NOTE — Clinical Note
Closure device placed in the right radial artery. Site closed by radial compression system. 11cc in band

## 2025-01-21 ENCOUNTER — APPOINTMENT (OUTPATIENT)
Dept: VASCULAR MEDICINE | Facility: HOSPITAL | Age: 75
DRG: 234 | End: 2025-01-21
Payer: MEDICARE

## 2025-01-21 LAB
ATRIAL RATE: 67 BPM
ATRIAL RATE: 68 BPM
GLUCOSE BLD MANUAL STRIP-MCNC: 76 MG/DL (ref 74–99)
P AXIS: 56 DEGREES
P AXIS: 76 DEGREES
P OFFSET: 187 MS
P ONSET: 119 MS
PR INTERVAL: 200 MS
PR INTERVAL: 201 MS
Q ONSET: 219 MS
Q ONSET: 252 MS
QRS COUNT: 10 BEATS
QRS COUNT: 11 BEATS
QRS DURATION: 80 MS
QRS DURATION: 91 MS
QT INTERVAL: 402 MS
QT INTERVAL: 418 MS
QTC CALCULATION(BAZETT): 424 MS
QTC CALCULATION(BAZETT): 438 MS
QTC FREDERICIA: 417 MS
QTC FREDERICIA: 431 MS
R AXIS: 28 DEGREES
R AXIS: 53 DEGREES
T AXIS: 24 DEGREES
T AXIS: 51 DEGREES
T OFFSET: 420 MS
T OFFSET: 461 MS
VENTRICULAR RATE: 66 BPM
VENTRICULAR RATE: 67 BPM

## 2025-01-21 PROCEDURE — B2111ZZ FLUOROSCOPY OF MULTIPLE CORONARY ARTERIES USING LOW OSMOLAR CONTRAST: ICD-10-PCS | Performed by: INTERNAL MEDICINE

## 2025-01-21 PROCEDURE — 4A023N7 MEASUREMENT OF CARDIAC SAMPLING AND PRESSURE, LEFT HEART, PERCUTANEOUS APPROACH: ICD-10-PCS | Performed by: INTERNAL MEDICINE

## 2025-01-21 PROCEDURE — 99153 MOD SED SAME PHYS/QHP EA: CPT | Performed by: INTERNAL MEDICINE

## 2025-01-21 PROCEDURE — 93880 EXTRACRANIAL BILAT STUDY: CPT

## 2025-01-21 PROCEDURE — C1887 CATHETER, GUIDING: HCPCS | Performed by: INTERNAL MEDICINE

## 2025-01-21 PROCEDURE — 99222 1ST HOSP IP/OBS MODERATE 55: CPT

## 2025-01-21 PROCEDURE — 2720000007 HC OR 272 NO HCPCS: Performed by: INTERNAL MEDICINE

## 2025-01-21 PROCEDURE — 93970 EXTREMITY STUDY: CPT | Performed by: SURGERY

## 2025-01-21 PROCEDURE — C1894 INTRO/SHEATH, NON-LASER: HCPCS | Performed by: INTERNAL MEDICINE

## 2025-01-21 PROCEDURE — 93922 UPR/L XTREMITY ART 2 LEVELS: CPT

## 2025-01-21 PROCEDURE — 96373 THER/PROPH/DIAG INJ IA: CPT | Performed by: INTERNAL MEDICINE

## 2025-01-21 PROCEDURE — 2500000004 HC RX 250 GENERAL PHARMACY W/ HCPCS (ALT 636 FOR OP/ED): Performed by: INTERNAL MEDICINE

## 2025-01-21 PROCEDURE — C1760 CLOSURE DEV, VASC: HCPCS | Performed by: INTERNAL MEDICINE

## 2025-01-21 PROCEDURE — 93458 L HRT ARTERY/VENTRICLE ANGIO: CPT | Performed by: INTERNAL MEDICINE

## 2025-01-21 PROCEDURE — 82947 ASSAY GLUCOSE BLOOD QUANT: CPT

## 2025-01-21 PROCEDURE — 93880 EXTRACRANIAL BILAT STUDY: CPT | Performed by: SURGERY

## 2025-01-21 PROCEDURE — 7100000009 HC PHASE TWO TIME - INITIAL BASE CHARGE: Performed by: INTERNAL MEDICINE

## 2025-01-21 PROCEDURE — 2500000001 HC RX 250 WO HCPCS SELF ADMINISTERED DRUGS (ALT 637 FOR MEDICARE OP): Performed by: NURSE PRACTITIONER

## 2025-01-21 PROCEDURE — 93930 UPPER EXTREMITY STUDY: CPT | Performed by: SURGERY

## 2025-01-21 PROCEDURE — 93922 UPR/L XTREMITY ART 2 LEVELS: CPT | Performed by: SURGERY

## 2025-01-21 PROCEDURE — 7100000010 HC PHASE TWO TIME - EACH INCREMENTAL 1 MINUTE: Performed by: INTERNAL MEDICINE

## 2025-01-21 PROCEDURE — 93930 UPPER EXTREMITY STUDY: CPT

## 2025-01-21 PROCEDURE — 2500000001 HC RX 250 WO HCPCS SELF ADMINISTERED DRUGS (ALT 637 FOR MEDICARE OP): Performed by: INTERNAL MEDICINE

## 2025-01-21 PROCEDURE — 1200000002 HC GENERAL ROOM WITH TELEMETRY DAILY

## 2025-01-21 PROCEDURE — 2550000001 HC RX 255 CONTRASTS: Performed by: INTERNAL MEDICINE

## 2025-01-21 PROCEDURE — 93970 EXTREMITY STUDY: CPT

## 2025-01-21 PROCEDURE — 99152 MOD SED SAME PHYS/QHP 5/>YRS: CPT | Performed by: INTERNAL MEDICINE

## 2025-01-21 RX ORDER — MIDAZOLAM HYDROCHLORIDE 1 MG/ML
INJECTION, SOLUTION INTRAMUSCULAR; INTRAVENOUS AS NEEDED
Status: DISCONTINUED | OUTPATIENT
Start: 2025-01-21 | End: 2025-01-21 | Stop reason: HOSPADM

## 2025-01-21 RX ORDER — FENTANYL CITRATE 50 UG/ML
INJECTION, SOLUTION INTRAMUSCULAR; INTRAVENOUS AS NEEDED
Status: DISCONTINUED | OUTPATIENT
Start: 2025-01-21 | End: 2025-01-21 | Stop reason: HOSPADM

## 2025-01-21 RX ORDER — LIDOCAINE HYDROCHLORIDE 20 MG/ML
INJECTION, SOLUTION INFILTRATION; PERINEURAL AS NEEDED
Status: DISCONTINUED | OUTPATIENT
Start: 2025-01-21 | End: 2025-01-21 | Stop reason: HOSPADM

## 2025-01-21 RX ORDER — NITROGLYCERIN 5 MG/ML
INJECTION, SOLUTION INTRAVENOUS AS NEEDED
Status: DISCONTINUED | OUTPATIENT
Start: 2025-01-21 | End: 2025-01-21 | Stop reason: HOSPADM

## 2025-01-21 RX ORDER — VERAPAMIL HYDROCHLORIDE 2.5 MG/ML
INJECTION, SOLUTION INTRAVENOUS AS NEEDED
Status: DISCONTINUED | OUTPATIENT
Start: 2025-01-21 | End: 2025-01-21 | Stop reason: HOSPADM

## 2025-01-21 RX ORDER — HEPARIN SODIUM 1000 [USP'U]/ML
INJECTION, SOLUTION INTRAVENOUS; SUBCUTANEOUS AS NEEDED
Status: DISCONTINUED | OUTPATIENT
Start: 2025-01-21 | End: 2025-01-21 | Stop reason: HOSPADM

## 2025-01-21 RX ORDER — LOVASTATIN 40 MG/1
40 TABLET ORAL DAILY
Status: DISCONTINUED | OUTPATIENT
Start: 2025-01-21 | End: 2025-01-29 | Stop reason: HOSPADM

## 2025-01-21 RX ORDER — ALPRAZOLAM 0.25 MG/1
0.5 TABLET ORAL 2 TIMES DAILY PRN
Status: DISCONTINUED | OUTPATIENT
Start: 2025-01-21 | End: 2025-01-24

## 2025-01-21 RX ORDER — NAPROXEN SODIUM 220 MG/1
81 TABLET, FILM COATED ORAL DAILY
Status: DISCONTINUED | OUTPATIENT
Start: 2025-01-21 | End: 2025-01-29 | Stop reason: HOSPADM

## 2025-01-21 RX ADMIN — ASPIRIN 81 MG CHEWABLE TABLET 81 MG: 81 TABLET CHEWABLE at 06:26

## 2025-01-21 RX ADMIN — ALPRAZOLAM 0.5 MG: 0.25 TABLET ORAL at 20:04

## 2025-01-21 RX ADMIN — METOPROLOL TARTRATE 50 MG: 50 TABLET, FILM COATED ORAL at 06:26

## 2025-01-21 RX ADMIN — METOPROLOL TARTRATE 50 MG: 50 TABLET, FILM COATED ORAL at 20:04

## 2025-01-21 ASSESSMENT — ENCOUNTER SYMPTOMS
ENDOCRINE NEGATIVE: 1
NEUROLOGICAL NEGATIVE: 1
NEAR-SYNCOPE: 0
GASTROINTESTINAL NEGATIVE: 1
EYES NEGATIVE: 1
CONSTITUTIONAL NEGATIVE: 1
PSYCHIATRIC NEGATIVE: 1
SYNCOPE: 0
MUSCULOSKELETAL NEGATIVE: 1
PALPITATIONS: 0
ALLERGIC/IMMUNOLOGIC NEGATIVE: 1
SHORTNESS OF BREATH: 1

## 2025-01-21 ASSESSMENT — COGNITIVE AND FUNCTIONAL STATUS - GENERAL
MOBILITY SCORE: 24
DAILY ACTIVITIY SCORE: 24

## 2025-01-21 ASSESSMENT — PAIN - FUNCTIONAL ASSESSMENT
PAIN_FUNCTIONAL_ASSESSMENT: 0-10

## 2025-01-21 ASSESSMENT — PAIN SCALES - GENERAL

## 2025-01-21 ASSESSMENT — COLUMBIA-SUICIDE SEVERITY RATING SCALE - C-SSRS
6. HAVE YOU EVER DONE ANYTHING, STARTED TO DO ANYTHING, OR PREPARED TO DO ANYTHING TO END YOUR LIFE?: NO
1. IN THE PAST MONTH, HAVE YOU WISHED YOU WERE DEAD OR WISHED YOU COULD GO TO SLEEP AND NOT WAKE UP?: NO
2. HAVE YOU ACTUALLY HAD ANY THOUGHTS OF KILLING YOURSELF?: NO

## 2025-01-21 NOTE — POST-PROCEDURE NOTE
Physician Transition of Care Summary  Invasive Cardiovascular Lab    Procedure Date: 1/21/2025  Attending:    * Kris Palacio - Primary  Resident/Fellow/Other Assistant: Surgeons and Role:  * No surgeons found with a matching role *    Indications:   Pre-op Diagnosis      * ACS (acute coronary syndrome) (Multi) [I24.9]    Post-procedure diagnosis:   Post-op Diagnosis     * ACS (acute coronary syndrome) (Multi) [I24.9]    Procedure(s):   Left Heart Cath  52369 - NH L HRT CATH W/NJX L VENTRICULOGRAPHY IMG S&I        Procedure Findings:   LAD and LCx arose from separate ostium.  LAD: Large vessel.  Calcified and tortuous.  Mid LAD there is a complex 80 to 90% stenosis involving a large diagonal and moderate size septal.  LCx: Large vessel.  Heavily calcified.  Proximal circumflex there is a 80% stenosis.  RCA: Moderate-sized dominant vessel.  Ostial 90% stenosis.  LVEDP 20 mmHg    Description of the Procedure:   6 Hebrew, right radial, JL 3.5, JL 3.0, JR4    Complications:   None    Stents/Implants:   Implants       No implant documentation for this case.            Anticoagulation/Antiplatelet Plan:   IV heparin    Estimated Blood Loss:   5 mL    Anesthesia: Moderate Sedation Anesthesia Staff: No anesthesia staff entered.    Any Specimen(s) Removed:   No specimens collected during this procedure.    Disposition:   Referral for CABG      Electronically signed by: Kris Palacio MD, 1/21/2025 9:05 AM

## 2025-01-21 NOTE — NURSING NOTE
Report called to RN on 3rd floor.  Patient to be transferred back to floor in stable condition. No chest pain.  Vital signs stable.        1200  Transferred back to 3 telemetry in stable condition.

## 2025-01-21 NOTE — CONSULTS
"    [facility]   Cardiac / Thoracic Surgery   Inpatient Consult        History Of Present Illness  Jessy Garcia is a 75 y.o. female with a PMH significant for HTN, HLD, hypothyroidism, carotid stenosis s/p bilateral CEA, obesity who presented to [facility] on 2025 with NSTEMI. Patient reports the pain initially began approximately 4 days ago after shoveling snow. She reports the pain radiates across her chest she describes the pain as a \"hurt\"; severity 8/10 at its worst. She does report the pain is more noticeable with minimal exertion but also states the pain occurs at rest. She does report some associated shortness of breath, she denies any nausea, vomiting, diaphoresis. She denies any recent fevers chills, abdominal pain, headache, dizziness. She reports she is intermittently compliant with her blood pressure and cholesterol medications.     ED course: significant for HS tropnin (36), D-dimer 717 with CT PE negative for PE.    LHC performed  by Dr. Palacio and she was found to have triple vessel disease. Cardiac surgery was consulted for CABG evaluation.      Subjective   Past Medical History  HTN  HLD  Hypothyroidism  Carotid stenosis s/p CEA  Obesity    Surgical History  She has a past surgical history that includes Other surgical history (2017).  Hx of bilateral CEA     Social History  She reports that she has never smoked. She has never been exposed to tobacco smoke. She has never used smokeless tobacco. She reports that she does not drink alcohol and does not use drugs.    Family History  Father  of heart attack  Mother  of AAA rupture     Allergies  Atorvastatin calcium, House dust mite, Pollen extracts, and Rosuvastatin calcium    Review of Systems  Review of Systems   Constitutional: Negative.   Eyes: Negative.    Cardiovascular:  Positive for chest pain. Negative for near-syncope, palpitations and syncope. "   Respiratory:  Positive for shortness of breath (associated with chest discomfort).    Endocrine: Negative.    Skin: Negative.    Musculoskeletal: Negative.    Gastrointestinal: Negative.    Genitourinary: Negative.    Neurological: Negative.    Psychiatric/Behavioral: Negative.     Allergic/Immunologic: Negative.            Objective   Physical Exam  Physical Exam  Constitutional:       Appearance: Normal appearance. She is obese.   HENT:      Mouth/Throat:      Mouth: Mucous membranes are moist.      Pharynx: Oropharynx is clear.   Eyes:      Conjunctiva/sclera: Conjunctivae normal.   Cardiovascular:      Rate and Rhythm: Normal rate and regular rhythm.      Pulses: Normal pulses.      Heart sounds: Normal heart sounds.      Comments: 1+ left DP, all others 2+  Pulmonary:      Effort: Pulmonary effort is normal. No respiratory distress.      Breath sounds: Normal breath sounds.   Abdominal:      General: Bowel sounds are normal. There is no distension.      Palpations: Abdomen is soft.      Tenderness: There is no abdominal tenderness.   Skin:     General: Skin is warm and dry.      Capillary Refill: Capillary refill takes less than 2 seconds.   Neurological:      General: No focal deficit present.      Mental Status: She is alert and oriented to person, place, and time.          Last Recorded Vitals  /85   Pulse 80   Temp 35.5 °C (95.9 °F)   Resp 16   Wt 89.7 kg (197 lb 12 oz)   SpO2 94%     Relevant Results  Last Labs:  CBC - 1/20/2025:  1:48 AM  10.9 15.2 320    47.0      CMP - 1/20/2025:  1:48 AM  9.9 8.3 17 --- 0.2   _ 4.4 16 89      PTT - 1/20/2025:  1:48 AM  1.0   11.3 33     Troponin I, High Sensitivity   Date/Time Value Ref Range Status   01/20/2025 09:47 AM 32 (H) 0 - 13 ng/L Final   01/20/2025 02:50 AM 16 (H) 0 - 13 ng/L Final   01/20/2025 01:48 AM 16 (H) 0 - 13 ng/L Final     Hemoglobin A1C   Date/Time Value Ref Range Status   01/20/2025 01:48 AM 5.8 (H) See comment % Final   08/02/2024  12:57 PM 5.7 (H) 4.3 - 5.6 % Final     Comment:     American Diabetes Association guidelines indicate that patients with HgbA1c in the range 5.7-6.4% are at increased risk for development of diabetes, and intervention by lifestyle modification may be beneficial. HgbA1c greater or equal to 6.5% is considered diagnostic of diabetes.   08/22/2022 12:40 PM 5.9 (H) 4.3 - 5.6 % Final     Comment:     American Diabetes Association guidelines indicate that patients with HgbA1c in the range 5.7-6.4% are at increased risk for development of diabetes, and intervention by lifestyle modification may be beneficial. HgbA1c greater or equal to 6.5% is considered diagnostic of diabetes.     LDL Calculated   Date/Time Value Ref Range Status   01/20/2025 01:48  (H) <=99 mg/dL Final     Comment:                                 Near   Borderline      AGE      Desirable  Optimal    High     High     Very High     0-19 Y     0 - 109     ---    110-129   >/= 130     ----    20-24 Y     0 - 119     ---    120-159   >/= 160     ----      >24 Y     0 -  99   100-129  130-159   160-189     >/=190       VLDL   Date/Time Value Ref Range Status   01/20/2025 01:48 AM 51 (H) 0 - 40 mg/dL Final      Last I/O:  No intake/output data recorded.    Radiographic Testing  EKG:  ECG 12 lead STAT 01/20/2025 (Preliminary)      ECG 12 lead 01/19/2025 (Preliminary)    Echo:  Transthoracic Echo (TTE) Complete 01/20/2025  PHYSICIAN INTERPRETATION:  Left Ventricle: Left ventricular ejection fraction is normal, by visual estimate at 55-60%. There are no regional left ventricular wall motion abnormalities. The left ventricular cavity size is normal. There is mildly increased septal and mildly increased posterior left ventricular wall thickness. There is left ventricular concentric remodeling. Spectral Doppler shows a Grade I (impaired relaxation pattern) of left ventricular diastolic filling with normal left atrial filling pressure.  Left Atrium: The left  atrium is normal in size.  Right Ventricle: The right ventricle is normal in size. There is normal right ventricular global systolic function.  Right Atrium: The right atrium is normal in size.  Aortic Valve: The aortic valve is trileaflet. There is mild aortic valve thickening. There is evidence of mildly elevated transaortic gradients consistent with sclerosis of the aortic valve. The aortic valve dimensionless index is 0.65. There is trace aortic valve regurgitation. The peak instantaneous gradient of the aortic valve is 12 mmHg. The mean gradient of the aortic valve is 8 mmHg.  Mitral Valve: The mitral valve is mildly thickened. The peak instantaneous gradient of the mitral valve is 8 mmHg. There is moderate mitral valve regurgitation. The mitral regurgitant orifice area is 7 mm2. The mitral regurgitant volume is 15.75 ml.  Tricuspid Valve: The tricuspid valve is structurally normal. There is trace tricuspid regurgitation.  Pulmonic Valve: The pulmonic valve is structurally normal. There is trace pulmonic valve regurgitation.  Pericardium: There is no pericardial effusion noted.  Aorta: The aortic root is normal.  Systemic Veins: The inferior vena cava appears normal in size.  In comparison to the previous echocardiogram(s): Compared with study dated 5/16/2011,.        CONCLUSIONS:   1. Left ventricular ejection fraction is normal, by visual estimate at 55-60%.   2. Spectral Doppler shows a Grade I (impaired relaxation pattern) of left ventricular diastolic filling with normal left atrial filling pressure.   3. There is normal right ventricular global systolic function.   4. Moderate mitral valve regurgitation.   5. Aortic valve sclerosis.     QUANTITATIVE DATA SUMMARY:     2D MEASUREMENTS:          Normal Ranges:  LAs:             4.70 cm  (2.7-4.0cm)  IVSd:            1.00 cm  (0.6-1.1cm)  LVPWd:           1.10 cm  (0.6-1.1cm)  LVIDd:           4.00 cm  (3.9-5.9cm)  LVIDs:           2.60 cm  LV Mass Index:    74 g/m2  LVEDV Index:     38 ml/m2  LV % FS          35.0 %        LA VOLUME:                    Normal Ranges:  LA Vol A4C:        42.3 ml    (22+/-6mL/m2)  LA Vol A2C:        37.5 ml  LA Vol BP:         40.9 ml  LA Vol Index A4C:  22.9ml/m2  LA Vol Index A2C:  20.3 ml/m2  LA Vol Index BP:   22.1 ml/m2  LA Area A4C:       16.9 cm2  LA Area A2C:       15.5 cm2  LA Major Axis A4C: 5.7 cm  LA Major Axis A2C: 5.4 cm  LA Volume Index:   22.1 ml/m2        RA VOLUME BY A/L METHOD:            Normal Ranges:  RA Vol A4C:              31.4 ml    (8.3-19.5ml)  RA Vol Index A4C:        17.0 ml/m2  RA Area A4C:             13.2 cm2  RA Major Axis A4C:       4.7 cm        M-MODE MEASUREMENTS:         Normal Ranges:  Ao Root:             2.20 cm (2.0-3.7cm)  LAs:                 5.50 cm (2.7-4.0cm)        AORTA MEASUREMENTS:         Normal Ranges:  Ao Sinus, d:        2.50 cm (2.1-3.5cm)  Ao STJ, d:          2.50 cm (1.7-3.4cm)  Asc Ao, d:          3.60 cm (2.1-3.4cm)        LV SYSTOLIC FUNCTION BY 2D PLANIMETRY (MOD):                       Normal Ranges:  EF-A4C View:    56 % (>=55%)  EF-A2C View:    60 %  EF-Biplane:     57 %  EF-Visual:      58 %  LV EF Reported: 58 %        LV DIASTOLIC FUNCTION:             Normal Ranges:  MV Peak E:             1.35 m/s    (0.7-1.2 m/s)  MV Peak A:             1.26 m/s    (0.42-0.7 m/s)  E/A Ratio:             1.07        (1.0-2.2)  MV e'                  0.095 m/s   (>8.0)  MV lateral e'          0.09 m/s  MV medial e'           0.10 m/s  MV A Dur:              100.00 msec  E/e' Ratio:            14.20       (<8.0)  PulmV Sys Frank:         70.90 cm/s  PulmV Herr Frank:        101.00 cm/s  PulmV S/D Frank:         0.70  PulmV A Revs Frank:      37.40 cm/s  PulmV A Revs Dur:      114.00 msec        MITRAL VALVE:          Normal Ranges:  MV Vmax:      1.42 m/s (<=1.3m/s)  MV peak P.1 mmHg (<5mmHg)  MV mean P.0 mmHg (<2mmHg)  MV DT:        108 msec (150-240msec)        MITRAL  INSUFFICIENCY:             Normal Ranges:  PISA Radius:          0.3 cm  MR VTI:               241.00 cm  MR Vmax:              669.00 cm/s  MR Alias Frank:         77.3 cm/s  MR Volume:            15.75 ml  MR Flow Rt:           43.71 ml/s  MR EROA:              7 mm2        AORTIC VALVE:                      Normal Ranges:  AoV Vmax:                1.74 m/s  (<=1.7m/s)  AoV Peak P.1 mmHg (<20mmHg)  AoV Mean P.0 mmHg  (1.7-11.5mmHg)  LVOT Max Frank:            1.09 m/s  (<=1.1m/s)  AoV VTI:                 43.00 cm  (18-25cm)  LVOT VTI:                27.80 cm  LVOT Diameter:           2.00 cm   (1.8-2.4cm)  AoV Area, VTI:           2.03 cm2  (2.5-5.5cm2)  AoV Area,Vmax:           1.97 cm2  (2.5-4.5cm2)  AoV Dimensionless Index: 0.65        RIGHT VENTRICLE:  RV Basal 3.90 cm  RV Mid   3.30 cm  RV Major 7.9 cm  TAPSE:   22.9 mm  RV s'    0.11 m/s        TRICUSPID VALVE/RVSP:          Normal Ranges:  Peak TR Velocity:     1.44 m/s  Est. RA Pressure:     3 mmHg  RV Syst Pressure:     11 mmHg  (< 30mmHg)  IVC Diam:             1.52 cm        PULMONIC VALVE:          Normal Ranges:  PV Max Frank:     1.0 m/s  (0.6-0.9m/s)  PV Max P.2 mmHg        Pulmonary Veins:  PulmV A Revs Dur: 114.00 msec  PulmV A Revs Frank: 37.40 cm/s  PulmV Herr Frank:   101.00 cm/s  PulmV S/D Frank:    0.70  PulmV Sys Frank:    70.90 cm/s       Ejection Fractions:  EF   Date/Time Value Ref Range Status   2025 02:15 PM 58 %      Cath:  Cardiac Catheterization Procedure 2025  Coronary Angiography:  The coronary circulation is right dominant.     Coronary Angiography Comments:  64-year-old woman with a history of bilateral carotid endarterectomy, uncontrolled hypertension, uncontrolled hyperlipidemia presents with unstable angina. Urgent heart cath recommended.     Fluoroscopy demonstrated moderate calcification of the coronary tree.     Left main: Short vessel. No significant disease.  LAD: Large vessel.  Tortuous and calcified. In the mid LAD there is a complex 80 to 90% stenosis involving a large diagonal and moderate-sized septal. The remainder the LAD has moderate disease.  Circumflex: Moderate size vessel. Proximal 80% stenosis. Remainder the vessel is free of disease.  RCA: Dominant vessel. Ostial 90% stenosis.  LVEDP 21 mmHg. Left atrial angiography not performed.    Stress Test:  No results found for this or any previous visit from the past 1095 days.    Cardiac Imaging:  No results found for this or any previous visit from the past 1095 days.    Chest Imaging:  Cardiac Catheterization Procedure   Final Result      Transthoracic Echo (TTE) Complete   Final Result      CT angio chest for pulmonary embolism   Final Result   1.No CT evidence of pulmonary embolism.   2.Normal aorta.   3.Small pulmonary nodule in the right upper lobe measuring 0.4   cm. As an isolated phenomena likely of little significance but   correlate with any malignancy history.   4.No evidence of pulmonary artery filling defect to suggest a   pulmonary embolism.   5.No evidence of aortic dissection or aneurysmal dilatation.   6.Small distal esophageal hiatal hernia.   7.Atherosclerotic changes of the abdominal aorta and its   branches. There is some narrowing of the proximal celiac axis   estimated be approximately 50-60%.   Signed by Albino Ritchie MD      XR chest 1 view   Final Result   No acute process.   Signed by Jo Veras MD      Vascular US carotid artery duplex bilateral    (Results Pending)   Vascular US lower extremity vein mapping bilateral    (Results Pending)   Vascular US upper extremity arterial duplex bilateral    (Results Pending)   Vascular US palmar arch evaluation    (Results Pending)           Assessment & Plan    Seen this morning after C. She is willing to have CABG. Imaging and labs reviewed with Dr. Hunt, who believes she would be favorable for CABG. In the setting of current symptoms, she would be best  served with operation this admission.     - Vascular studies ordered (carotid duplex, lower extremity vein mapping, upper extremity arterial mapping with cotton arch)  - No need for repeat CT chest as CT PE is satisfactory for aortic assessment  - Date of operation TBD  - If she continues to have angina at rest, recommend transfer to ICU and initiation of IV nitrate    When/if goes to surgery:  - Continue ASA, high-intensity statin, and BB (or document contraindications for use)  ----> statin intolerance, will plan for repatha at discharge  - No ACEi/ARBs in the pre-op period (at least 48 hours)  - Hold any SGLT2 inhibitors (Farxiga, Jardiance, etc.) for at least 3 days prior to cardiac surgery to prevent euglycemic DKA  - No antiplatelets other than ASA, no anticoagulants other than Heparin  - NPO after midnight, blood on hold/T&S, and preop scrubs only to be ordered once OR date is established    STS ACSD:  Procedure Type: Isolated CABG  Perioperative Outcome Estimate %  Operative Mortality 6.19%  Morbidity & Mortality 15.5%  Stroke 1.32%  Renal Failure 1.64%  Reoperation 3.69%  Prolonged Ventilation 12%  Deep Sternal Wound Infection 0.909%  Long Hospital Stay (>14 days) 5.57%  Short Hospital Stay (<6 days)* 26.5%    Patient discussed with Dr. Jerome Hunt who plans to see the patient this afternoon.    PATSY Perkins-CNP

## 2025-01-21 NOTE — PROGRESS NOTES
Jessy Garcia is a 75 y.o. female on day 0 of admission presenting with Chest pain, unspecified type.      Subjective   Seen today she went for her heart cath and has multivessel disease cardiac surgery was consulted and she is to have a CABG this week sometime       Objective     Last Recorded Vitals  /60   Pulse 61   Temp 36 °C (96.8 °F)   Resp 16   Wt 89.7 kg (197 lb 12 oz)   SpO2 93%   Intake/Output last 3 Shifts:    Intake/Output Summary (Last 24 hours) at 1/21/2025 1724  Last data filed at 1/21/2025 0901  Gross per 24 hour   Intake --   Output 5 ml   Net -5 ml       Admission Weight  Weight: 89.7 kg (197 lb 12 oz) (01/19/25 1925)    Daily Weight  01/19/25 : 89.7 kg (197 lb 12 oz)    Image Results  Vascular US upper extremity arterial duplex bilateral  Preliminary Cardiology Report             [place] [address]  Tel [phone] and Fax [phone]                Preliminary Vascular Lab Report     [facility] US UPPER EXTREMITY ARTERIAL DUPLEX BILATERAL       Patient Name:      JESSY Tristan Physician: [id number] Micheal Angelo MD,                     NURY                         RPVI  Study Date:        1/21/2025       Ordering           [id number] SONG GANDHI                                     Physician:  MRN/PID:           [id number]        Technologist:      Ayesha Carson RVT  Accession#:        [id number]    Technologist 2:  Date of Birth/Age: 1950       Encounter#:        [id number]  Gender:            F  Admission Status:  Inpatient       Location           [facility]                                     Performed:       Diagnosis/ICD: 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  Left Upper Arterial: The left radial artery is patent with  triphasic waveforms.     Imaging & Doppler Findings:      Right                    Left    PSV   Waveform           PSV   Waveform  75 cm/s Triphasic Radial 66 cm/s Triphasic                     Right   Left  Radial Diam P 2.3 mm 2.3 mm  Radial Diam M 2.7 mm 2.0 mm  Radial Diam D 2.6 mm 1.8 mm            VASCULAR PRELIMINARY REPORT  completed by Ayesha Carson RVT on 1/21/2025 at 3:11:10 PM       ** Final **  Vascular US lower extremity vein mapping bilateral  Preliminary Cardiology Report             [facility]  [address] [place] [address]  Tel [phone] and Fax [phone]              Preliminary Vascular Lab Report     [facility] US LOWER EXTREMITY VEIN MAPPING BILATERAL       Patient Name:      AMY           Kun Physician: [id number] Micheal Angelo MD,                     [facility]  Study Date:        1/21/2025       Ordering           [id number] SONG GANDHI                                     Physician:  MRN/PID:           [id number]        Technologist:      Ayesha Carson RVT  Accession#:        [id number]    Technologist 2:  Date of Birth/Age: 1950       Encounter#:        [id number]  Gender:            F  Admission Status:  Inpatient       Location           [facility]                                     Performed:       Diagnosis/ICD: Encounter for preprocedural cardiovascular examination-Z01.810  Indication:    CAD Autologous vein bypass graft  Procedure/CPT: 87804 Vein mapping complete       PRELIMINARY CONCLUSIONS:  Right Lower Vein Mapping: The right great saphenous vein and small saphenous vein appear widely patent with no evidence of thrombosis or fibrosis.  Left Lower Vein Mapping: The left small saphenous was not visualized. Left lower extremity vein: The left great saphenous vein appears widely patent with no evidence of thrombosis or fibrosis.     Imaging & Doppler Findings:     Right          Compress Thrombus  Diam  SFJ               Yes      None   6.4 mm  Prox Thigh GSV   Yes      None   3.4 mm  Mid Thigh GSV    Yes      None   2.7 mm  Knee GSV         Yes      None   3.3 mm  Prox Calf GSV    Yes      None   3.0 mm  Mid Calf GSV     Yes      None   2.0 mm  Dist Calf GSV    Yes      None   1.7 mm  SSV Prox         Yes      None   1.3 mm  SSV Mid          Yes      None   1.5 mm  SSV Distal       Yes      None   1.5 mm       Left           Compress Thrombus  Diam  SFJ              Yes      None   5.9 mm  Prox Thigh GSV   Yes      None   4.0 mm  Mid Thigh GSV    Yes      None   2.9 mm  Knee GSV         Yes      None   2.8 mm  Prox Calf GSV    Yes      None   1.8 mm  Mid Calf GSV     Yes      None   1.3 mm  Dist Calf GSV    Yes      None   1.7 mm         VASCULAR PRELIMINARY REPORT  completed by Ayesha Carson RVT on 1/21/2025 at 3:16:00 PM       ** Final **  Vascular US carotid artery duplex bilateral  Preliminary Cardiology Report             [place] [address]  Tel [phone] and Fax [phone]           Preliminary Vascular Lab Report     [facility] US CAROTID ARTERY DUPLEX BILATERAL       Patient Name:      AMYNADJA Tristan Physician: [id number] Micheal Angelo MD,                     [facility]  Study Date:        1/21/2025       Ordering           [id number] SONG GANDHI                                     Physician:  MRN/PID:           [id number]        Technologist:      Ayesha Carson RVT  Accession#:        [id number]    Technologist 2:  Date of Birth/Age: 1950       Encounter#:        [id number]  Gender:            F  Admission Status:  Inpatient       Location           [facility]                                     Performed:       Diagnosis/ICD: Occlusion and stenosis of bilateral carotid arteries-I65.23  Procedure/CPT: 01827 Cerebrovascular Carotid Duplex scan complete       Patient History: Patient has Bilateral CEAs.       PRELIMINARY CONCLUSIONS:  Right  Carotid: Findings are consistent with less than 50% stenosis of the right proximal internal carotid artery. Laminar flow seen by color Doppler. Right external carotid artery appears patent with no evidence of stenosis. No evidence of hemodynamically significant stenosis of the right common carotid artery. The right vertebral artery is patent with antegrade flow. No evidence of hemodynamically significant stenosis in the right subclavian artery.  Left Carotid: Findings are consistent with less than 50% stenosis of the left proximal internal carotid artery. Laminar flow seen by color Doppler. Left external carotid artery appears patent with no evidence of stenosis. No evidence of hemodynamically significant stenosis of the left common carotid artery. The left vertebral artery is patent with antegrade flow. There is a >50% stenosis noted in the left subclavian artery.  Endarterectomy: Patent bilateral carotid endarterectomy sites following endarterectomy.     Imaging & Doppler Findings:  Right Plaque Morph: The proximal right internal carotid artery demonstrates calcified plaque. The distal right common carotid artery demonstrates calcified plaque.  Left Plaque Morph: The proximal left internal carotid artery demonstrates intimal thickening plaque. The mid left common carotid artery demonstrates heterogenous plaque. The distal left common carotid artery demonstrates intimal thickening plaque.      Right                        Left    PSV      EDV                PSV      EDV  98 cm/s            CCA P    140 cm/s  91 cm/s            CCA D    100 cm/s  71 cm/s  14 cm/s   ICA P    80 cm/s  20 cm/s  77 cm/s  18 cm/s   ICA M    107 cm/s 25 cm/s  80 cm/s  20 cm/s   ICA D    87 cm/s  24 cm/s  104 cm/s            ECA     85 cm/s  90 cm/s  25 cm/s Vertebral  31 cm/s  9 cm/s  202 cm/s         Subclavian 285 cm/s                     Right Left  ICA/CCA Ratio  0.8  0.8            VASCULAR PRELIMINARY REPORT  completed by Ayesha  Alli BORJAS on 1/21/2025 at 3:09:33 PM       ** Final **  ECG 12 lead  Sinus rhythm  Vascular US palmar arch evaluation  Preliminary Cardiology Report             [facility]  [address] [place] [address]  Tel [phone] and Fax [phone]       Preliminary Vascular Lab Report     [facility] US PALMAR ARCH EVALUATION       Patient Name:      AMY Tristan Physician: [id number] Micheal Angelo MD,                     [facility]  Study Date:        1/21/2025       Ordering           [id number] SONG GANDHI                                     Physician:  MRN/PID:           [id number]        Technologist:      Bernadette Horvath RVT  Accession#:        [id number]    Technologist 2:  Date of Birth/Age: 1950       Encounter#:        [id number]  Gender:            F  Admission Status:  Inpatient       Location           [facility]                                     Performed:       Diagnosis/ICD: Encounter for other preprocedural examination-Z01.818  Indication:    Pre-op coronary surgery  Procedure/CPT: 03386 Non-invasive physiologic studies       PRELIMINARY CONCLUSIONS:  Right Upper PVR: Baseline indices > 0.80 and waveforms appear normal. Complete right palmar arch.  Left Upper PVR: Baseline indices > 0.80 and waveforms appear normal. Incomplete palmar arch with good collaterals.     Imaging & Doppler Findings:     RIGHT Digit Pressures          % Drop Digit Index  Thumb                 142 mmHg Thumb  Thumb  Thumb w/ comp         135 mmHg 5 %    1.20          LEFT Digit Pressures          % Drop Digit Index  Thumb                130 mmHg Thumb  Thumb  Thumb w/ comp        95 mmHg  27 %   1.10                             Right     Left  Brachial Pressure 118 mmHg 118 mmHg            VASCULAR PRELIMINARY REPORT  completed by Bernadette Horvath RVT on 1/21/2025 at 2:38:59 PM       ** Final **  ECG 12 lead STAT  Normal sinus rhythm  Normal ECG  Cardiac Catheterization  Procedure     [facility], Cath Lab, [address], [place] [address]    Cardiovascular Catheterization Report    Patient Name:      AMY ARRINGTON    Performing Physician:  Rene Truong MD  Study Date:        1/21/2025           Verifying Physician:   Rene Truong MD  MRN/PID:           [id number]            Cardiologist/Co-Scrub:  Accession#:        [id number]        Ordering Provider:     Rene TRUONG  Date of Birth/Age: 1950 / 75      Cardiologist:                     years  Gender:            F                   Fellow:  Encounter#:        [id number]          Surgeon:       Study: Left Heart Cath       Indications:  AMY ARRINGTON is a 75 year old female who presents with carotid artery disease, dyslipidemia, hypertension, obesity and a chest pain assessment of typical angina. Acute coronary syndrome - STEMI.  Medical History:  Stress test performed: No. CTA performed: No. oHlly accessed: No. LVEF  Assessed: Yes. LVEF = 60%.  Cardiac arrest: No.  Cardiac surgical consult: Completed - cardiac surgery recommended.  Cardiovascular instability: No.  Frailty status of patient entering lab: 3 = Managing well.       Procedure Description:  After infiltration with 2% Lidocaine, the right radial artery was cannulated with a modified Seldinger technique. Subsequently a 6 [place]udian sheath was placed retrograde in the right radial artery. Selective coronary catheterization was performed using a 6 Fr catheter(s) exchanged over a guide wire to cannulate the coronary arteries. A JL JL 3.0 tip catheter was used for left coronary injections. A JR 4 tip catheter was used for right coronary injections.  Multiple injections of contrast were made into the left and right coronary arteries with angiograms  recorded in multiple projections. After completion of the procedure, the arterial sheath was pulled and a TR Band Radial Compression Device was utilized to obtain patent hemostasis.     Coronary Angiography:  The coronary circulation is right dominant.     Coronary Angiography Comments:  64-year-old woman with a history of bilateral carotid endarterectomy, uncontrolled hypertension, uncontrolled hyperlipidemia presents with unstable angina. Urgent heart cath recommended.    Fluoroscopy demonstrated moderate calcification of the coronary tree.    Left main: Short vessel. No significant disease.  LAD: Large vessel. Tortuous and calcified. In the mid LAD there is a complex 80 to 90% stenosis involving a large diagonal and moderate-sized septal. The remainder the LAD has moderate disease.  Circumflex: Moderate size vessel. Proximal 80% stenosis. Remainder the vessel is free of disease.  RCA: Dominant vessel. Ostial 90% stenosis.  LVEDP 21 mmHg. Left atrial angiography not performed.       Hemo Personnel:  +---------------+---------+  Name           Duty       +---------------+---------+  Kris Palacio MD 1  +---------------+---------+       Hemodynamic Pressures:     +----+-------------------+---------+------------+-------------+------+---------+  Site     Date Time       Phase    Systolic    Diastolic    ED  Mean mmHg                           Name       mmHg        mmHg      mmHg            +----+-------------------+---------+------------+-------------+------+---------+    AO  1/21/2025 8:38:46  O2 REST         125           64             89                       AM                                                   +----+-------------------+---------+------------+-------------+------+---------+    AO  1/21/2025 8:46:46  O2 REST         143           78            106                       AM                                                    +----+-------------------+---------+------------+-------------+------+---------+    LV  1/21/2025 8:52:02  O2 REST         141           18    26                                AM                                                   +----+-------------------+---------+------------+-------------+------+---------+    LV  1/21/2025 8:52:08  O2 REST         140           18    29                                AM                                                   +----+-------------------+---------+------------+-------------+------+---------+   LVp  1/21/2025 8:52:23  O2 REST         143           12    23                                AM                                                   +----+-------------------+---------+------------+-------------+------+---------+   AOp  1/21/2025 8:52:28  O2 REST         134           70             95                       AM                                                   +----+-------------------+---------+------------+-------------+------+---------+       Cardiac Cath Post Procedure Notes:  Post Procedure Diagnosis: ACS.  Blood Loss:               Estimated blood loss during the procedure was 2 mls.  Specimens Removed:        Number of specimen(s) removed: none.    ____________________________________________________________________________________  CONCLUSIONS:   1. Severe three-vessel disease in calcified, tortuous coronary arteries.   2. Patient will be referred for surgical revascularization.    ICD 10 Codes:  Atherosclerotic heart disease of native coronary artery with unstable angina pectoris-I25.110     CPT Codes:  Angiography, Each 1st additional vessel studied after basic exam-09766     [id number] Kris Palacio MD  Performing Physician  Electronically signed by [id number] Kris Palacio MD on 1/21/2025 at 9:16:29 AM         ** Final **    Results from last 7 days   Lab Units 01/20/25  0148   WBC AUTO x10*3/uL 10.9    HEMOGLOBIN g/dL 15.2   HEMATOCRIT % 47.0*   PLATELETS AUTO x10*3/uL 320      Results from last 7 days   Lab Units 01/20/25  0148   SODIUM mmol/L 138   POTASSIUM mmol/L 4.2   CHLORIDE mmol/L 103   CO2 mmol/L 27   BUN mg/dL 21   CREATININE mg/dL 0.86   GLUCOSE mg/dL 118*   CALCIUM mg/dL 9.9      In the review of systems no chest pain no shortness of breath at this time she is comfortable she has had no chest discomfort overnight hemodynamically stable and cardiac evaluation will be in process    Physical Exam  Lungs are diminished but clear heart has regular rate and rhythm she is awake and alert and oriented she is comfortable no chest pain at this time  Relevant Results               Assessment/Plan                  Assessment & Plan  Chest pain, unspecified type    ACS (acute coronary syndrome) (Multi)    Coronary artery disease triple-vessel disease we do not have we do not have a date of surgery yet I was told but it should be at the end of this week and we will see about scheduling and then I will follow her course through the surgery              Brett Flores DO

## 2025-01-21 NOTE — PROGRESS NOTES
Cardiology Progress    Impression:  Unstable angina.  Cardiac cath showing severe three-vessel disease.  Uncontrolled hypertension  Uncontrolled hyperlipidemia.  Intolerant to multiple statins.  Obesity  Carotid disease.  Bilateral carotid endarterectomy  Plan:  Continue aspirin and beta-blocker.  PCSK9i.  Will initiate paperwork while in hospital.  Surgical referral  HPI:  Intermittent chest pain overnight.  Relieved with nitro.  Cardiac cath today showing critical three-vessel disease.  Meds:  Scheduled medications  aspirin, 81 mg, oral, Daily  enoxaparin, 40 mg, subcutaneous, q24h  metoprolol tartrate, 50 mg, oral, BID      Continuous medications     PRN medications  PRN medications: acetaminophen **OR** acetaminophen **OR** acetaminophen, fentaNYL PF, heparin, HYDROmorphone, iohexol, lidocaine, midazolam, nitroglycerin, verapamil    Physical exam:  Vitals:    01/21/25 0537   BP: 180/85   Pulse: 80   Resp: 16   Temp: 35.5 °C (95.9 °F)   SpO2: 94%      No JVD.  Chest clear.  No edema.  EKG:  Telemetry showing sinus rhythm.  Echo:  Normal EF.  Moderate MR.  Labs:  Lab Results   Component Value Date    WBC 10.9 01/20/2025    HGB 15.2 01/20/2025    HCT 47.0 (H) 01/20/2025     01/20/2025    CHOL 398 (H) 01/20/2025    TRIG 253 (H) 01/20/2025    HDL 62.3 01/20/2025    ALT 16 01/20/2025    AST 17 01/20/2025     01/20/2025    K 4.2 01/20/2025     01/20/2025    CREATININE 0.86 01/20/2025    BUN 21 01/20/2025    CO2 27 01/20/2025    TSH 7.90 (H) 01/20/2025    INR 1.0 01/20/2025    HGBA1C 5.8 (H) 01/20/2025     par

## 2025-01-21 NOTE — H&P (VIEW-ONLY)
"    [facility]   Cardiac / Thoracic Surgery   Inpatient Consult        History Of Present Illness  Jessy Garcia is a 75 y.o. female with a PMH significant for HTN, HLD, hypothyroidism, carotid stenosis s/p bilateral CEA, obesity who presented to [facility] on 2025 with NSTEMI. Patient reports the pain initially began approximately 4 days ago after shoveling snow. She reports the pain radiates across her chest she describes the pain as a \"hurt\"; severity 8/10 at its worst. She does report the pain is more noticeable with minimal exertion but also states the pain occurs at rest. She does report some associated shortness of breath, she denies any nausea, vomiting, diaphoresis. She denies any recent fevers chills, abdominal pain, headache, dizziness. She reports she is intermittently compliant with her blood pressure and cholesterol medications.     ED course: significant for HS tropnin (36), D-dimer 717 with CT PE negative for PE.    LHC performed  by Dr. Palacio and she was found to have triple vessel disease. Cardiac surgery was consulted for CABG evaluation.      Subjective   Past Medical History  HTN  HLD  Hypothyroidism  Carotid stenosis s/p CEA  Obesity    Surgical History  She has a past surgical history that includes Other surgical history (2017).  Hx of bilateral CEA     Social History  She reports that she has never smoked. She has never been exposed to tobacco smoke. She has never used smokeless tobacco. She reports that she does not drink alcohol and does not use drugs.    Family History  Father  of heart attack  Mother  of AAA rupture     Allergies  Atorvastatin calcium, House dust mite, Pollen extracts, and Rosuvastatin calcium    Review of Systems  Review of Systems   Constitutional: Negative.   Eyes: Negative.    Cardiovascular:  Positive for chest pain. Negative for near-syncope, palpitations and syncope. "   Respiratory:  Positive for shortness of breath (associated with chest discomfort).    Endocrine: Negative.    Skin: Negative.    Musculoskeletal: Negative.    Gastrointestinal: Negative.    Genitourinary: Negative.    Neurological: Negative.    Psychiatric/Behavioral: Negative.     Allergic/Immunologic: Negative.            Objective   Physical Exam  Physical Exam  Constitutional:       Appearance: Normal appearance. She is obese.   HENT:      Mouth/Throat:      Mouth: Mucous membranes are moist.      Pharynx: Oropharynx is clear.   Eyes:      Conjunctiva/sclera: Conjunctivae normal.   Cardiovascular:      Rate and Rhythm: Normal rate and regular rhythm.      Pulses: Normal pulses.      Heart sounds: Normal heart sounds.      Comments: 1+ left DP, all others 2+  Pulmonary:      Effort: Pulmonary effort is normal. No respiratory distress.      Breath sounds: Normal breath sounds.   Abdominal:      General: Bowel sounds are normal. There is no distension.      Palpations: Abdomen is soft.      Tenderness: There is no abdominal tenderness.   Skin:     General: Skin is warm and dry.      Capillary Refill: Capillary refill takes less than 2 seconds.   Neurological:      General: No focal deficit present.      Mental Status: She is alert and oriented to person, place, and time.          Last Recorded Vitals  /85   Pulse 80   Temp 35.5 °C (95.9 °F)   Resp 16   Wt 89.7 kg (197 lb 12 oz)   SpO2 94%     Relevant Results  Last Labs:  CBC - 1/20/2025:  1:48 AM  10.9 15.2 320    47.0      CMP - 1/20/2025:  1:48 AM  9.9 8.3 17 --- 0.2   _ 4.4 16 89      PTT - 1/20/2025:  1:48 AM  1.0   11.3 33     Troponin I, High Sensitivity   Date/Time Value Ref Range Status   01/20/2025 09:47 AM 32 (H) 0 - 13 ng/L Final   01/20/2025 02:50 AM 16 (H) 0 - 13 ng/L Final   01/20/2025 01:48 AM 16 (H) 0 - 13 ng/L Final     Hemoglobin A1C   Date/Time Value Ref Range Status   01/20/2025 01:48 AM 5.8 (H) See comment % Final   08/02/2024  12:57 PM 5.7 (H) 4.3 - 5.6 % Final     Comment:     American Diabetes Association guidelines indicate that patients with HgbA1c in the range 5.7-6.4% are at increased risk for development of diabetes, and intervention by lifestyle modification may be beneficial. HgbA1c greater or equal to 6.5% is considered diagnostic of diabetes.   08/22/2022 12:40 PM 5.9 (H) 4.3 - 5.6 % Final     Comment:     American Diabetes Association guidelines indicate that patients with HgbA1c in the range 5.7-6.4% are at increased risk for development of diabetes, and intervention by lifestyle modification may be beneficial. HgbA1c greater or equal to 6.5% is considered diagnostic of diabetes.     LDL Calculated   Date/Time Value Ref Range Status   01/20/2025 01:48  (H) <=99 mg/dL Final     Comment:                                 Near   Borderline      AGE      Desirable  Optimal    High     High     Very High     0-19 Y     0 - 109     ---    110-129   >/= 130     ----    20-24 Y     0 - 119     ---    120-159   >/= 160     ----      >24 Y     0 -  99   100-129  130-159   160-189     >/=190       VLDL   Date/Time Value Ref Range Status   01/20/2025 01:48 AM 51 (H) 0 - 40 mg/dL Final      Last I/O:  No intake/output data recorded.    Radiographic Testing  EKG:  ECG 12 lead STAT 01/20/2025 (Preliminary)      ECG 12 lead 01/19/2025 (Preliminary)    Echo:  Transthoracic Echo (TTE) Complete 01/20/2025  PHYSICIAN INTERPRETATION:  Left Ventricle: Left ventricular ejection fraction is normal, by visual estimate at 55-60%. There are no regional left ventricular wall motion abnormalities. The left ventricular cavity size is normal. There is mildly increased septal and mildly increased posterior left ventricular wall thickness. There is left ventricular concentric remodeling. Spectral Doppler shows a Grade I (impaired relaxation pattern) of left ventricular diastolic filling with normal left atrial filling pressure.  Left Atrium: The left  atrium is normal in size.  Right Ventricle: The right ventricle is normal in size. There is normal right ventricular global systolic function.  Right Atrium: The right atrium is normal in size.  Aortic Valve: The aortic valve is trileaflet. There is mild aortic valve thickening. There is evidence of mildly elevated transaortic gradients consistent with sclerosis of the aortic valve. The aortic valve dimensionless index is 0.65. There is trace aortic valve regurgitation. The peak instantaneous gradient of the aortic valve is 12 mmHg. The mean gradient of the aortic valve is 8 mmHg.  Mitral Valve: The mitral valve is mildly thickened. The peak instantaneous gradient of the mitral valve is 8 mmHg. There is moderate mitral valve regurgitation. The mitral regurgitant orifice area is 7 mm2. The mitral regurgitant volume is 15.75 ml.  Tricuspid Valve: The tricuspid valve is structurally normal. There is trace tricuspid regurgitation.  Pulmonic Valve: The pulmonic valve is structurally normal. There is trace pulmonic valve regurgitation.  Pericardium: There is no pericardial effusion noted.  Aorta: The aortic root is normal.  Systemic Veins: The inferior vena cava appears normal in size.  In comparison to the previous echocardiogram(s): Compared with study dated 5/16/2011,.        CONCLUSIONS:   1. Left ventricular ejection fraction is normal, by visual estimate at 55-60%.   2. Spectral Doppler shows a Grade I (impaired relaxation pattern) of left ventricular diastolic filling with normal left atrial filling pressure.   3. There is normal right ventricular global systolic function.   4. Moderate mitral valve regurgitation.   5. Aortic valve sclerosis.     QUANTITATIVE DATA SUMMARY:     2D MEASUREMENTS:          Normal Ranges:  LAs:             4.70 cm  (2.7-4.0cm)  IVSd:            1.00 cm  (0.6-1.1cm)  LVPWd:           1.10 cm  (0.6-1.1cm)  LVIDd:           4.00 cm  (3.9-5.9cm)  LVIDs:           2.60 cm  LV Mass Index:    74 g/m2  LVEDV Index:     38 ml/m2  LV % FS          35.0 %        LA VOLUME:                    Normal Ranges:  LA Vol A4C:        42.3 ml    (22+/-6mL/m2)  LA Vol A2C:        37.5 ml  LA Vol BP:         40.9 ml  LA Vol Index A4C:  22.9ml/m2  LA Vol Index A2C:  20.3 ml/m2  LA Vol Index BP:   22.1 ml/m2  LA Area A4C:       16.9 cm2  LA Area A2C:       15.5 cm2  LA Major Axis A4C: 5.7 cm  LA Major Axis A2C: 5.4 cm  LA Volume Index:   22.1 ml/m2        RA VOLUME BY A/L METHOD:            Normal Ranges:  RA Vol A4C:              31.4 ml    (8.3-19.5ml)  RA Vol Index A4C:        17.0 ml/m2  RA Area A4C:             13.2 cm2  RA Major Axis A4C:       4.7 cm        M-MODE MEASUREMENTS:         Normal Ranges:  Ao Root:             2.20 cm (2.0-3.7cm)  LAs:                 5.50 cm (2.7-4.0cm)        AORTA MEASUREMENTS:         Normal Ranges:  Ao Sinus, d:        2.50 cm (2.1-3.5cm)  Ao STJ, d:          2.50 cm (1.7-3.4cm)  Asc Ao, d:          3.60 cm (2.1-3.4cm)        LV SYSTOLIC FUNCTION BY 2D PLANIMETRY (MOD):                       Normal Ranges:  EF-A4C View:    56 % (>=55%)  EF-A2C View:    60 %  EF-Biplane:     57 %  EF-Visual:      58 %  LV EF Reported: 58 %        LV DIASTOLIC FUNCTION:             Normal Ranges:  MV Peak E:             1.35 m/s    (0.7-1.2 m/s)  MV Peak A:             1.26 m/s    (0.42-0.7 m/s)  E/A Ratio:             1.07        (1.0-2.2)  MV e'                  0.095 m/s   (>8.0)  MV lateral e'          0.09 m/s  MV medial e'           0.10 m/s  MV A Dur:              100.00 msec  E/e' Ratio:            14.20       (<8.0)  PulmV Sys Frank:         70.90 cm/s  PulmV Herr Frank:        101.00 cm/s  PulmV S/D Frank:         0.70  PulmV A Revs Frank:      37.40 cm/s  PulmV A Revs Dur:      114.00 msec        MITRAL VALVE:          Normal Ranges:  MV Vmax:      1.42 m/s (<=1.3m/s)  MV peak P.1 mmHg (<5mmHg)  MV mean P.0 mmHg (<2mmHg)  MV DT:        108 msec (150-240msec)        MITRAL  INSUFFICIENCY:             Normal Ranges:  PISA Radius:          0.3 cm  MR VTI:               241.00 cm  MR Vmax:              669.00 cm/s  MR Alias Frank:         77.3 cm/s  MR Volume:            15.75 ml  MR Flow Rt:           43.71 ml/s  MR EROA:              7 mm2        AORTIC VALVE:                      Normal Ranges:  AoV Vmax:                1.74 m/s  (<=1.7m/s)  AoV Peak P.1 mmHg (<20mmHg)  AoV Mean P.0 mmHg  (1.7-11.5mmHg)  LVOT Max Frank:            1.09 m/s  (<=1.1m/s)  AoV VTI:                 43.00 cm  (18-25cm)  LVOT VTI:                27.80 cm  LVOT Diameter:           2.00 cm   (1.8-2.4cm)  AoV Area, VTI:           2.03 cm2  (2.5-5.5cm2)  AoV Area,Vmax:           1.97 cm2  (2.5-4.5cm2)  AoV Dimensionless Index: 0.65        RIGHT VENTRICLE:  RV Basal 3.90 cm  RV Mid   3.30 cm  RV Major 7.9 cm  TAPSE:   22.9 mm  RV s'    0.11 m/s        TRICUSPID VALVE/RVSP:          Normal Ranges:  Peak TR Velocity:     1.44 m/s  Est. RA Pressure:     3 mmHg  RV Syst Pressure:     11 mmHg  (< 30mmHg)  IVC Diam:             1.52 cm        PULMONIC VALVE:          Normal Ranges:  PV Max Frank:     1.0 m/s  (0.6-0.9m/s)  PV Max P.2 mmHg        Pulmonary Veins:  PulmV A Revs Dur: 114.00 msec  PulmV A Revs Frank: 37.40 cm/s  PulmV Herr Frank:   101.00 cm/s  PulmV S/D Frank:    0.70  PulmV Sys Frank:    70.90 cm/s       Ejection Fractions:  EF   Date/Time Value Ref Range Status   2025 02:15 PM 58 %      Cath:  Cardiac Catheterization Procedure 2025  Coronary Angiography:  The coronary circulation is right dominant.     Coronary Angiography Comments:  64-year-old woman with a history of bilateral carotid endarterectomy, uncontrolled hypertension, uncontrolled hyperlipidemia presents with unstable angina. Urgent heart cath recommended.     Fluoroscopy demonstrated moderate calcification of the coronary tree.     Left main: Short vessel. No significant disease.  LAD: Large vessel.  Tortuous and calcified. In the mid LAD there is a complex 80 to 90% stenosis involving a large diagonal and moderate-sized septal. The remainder the LAD has moderate disease.  Circumflex: Moderate size vessel. Proximal 80% stenosis. Remainder the vessel is free of disease.  RCA: Dominant vessel. Ostial 90% stenosis.  LVEDP 21 mmHg. Left atrial angiography not performed.    Stress Test:  No results found for this or any previous visit from the past 1095 days.    Cardiac Imaging:  No results found for this or any previous visit from the past 1095 days.    Chest Imaging:  Cardiac Catheterization Procedure   Final Result      Transthoracic Echo (TTE) Complete   Final Result      CT angio chest for pulmonary embolism   Final Result   1.No CT evidence of pulmonary embolism.   2.Normal aorta.   3.Small pulmonary nodule in the right upper lobe measuring 0.4   cm. As an isolated phenomena likely of little significance but   correlate with any malignancy history.   4.No evidence of pulmonary artery filling defect to suggest a   pulmonary embolism.   5.No evidence of aortic dissection or aneurysmal dilatation.   6.Small distal esophageal hiatal hernia.   7.Atherosclerotic changes of the abdominal aorta and its   branches. There is some narrowing of the proximal celiac axis   estimated be approximately 50-60%.   Signed by Albino Ritchie MD      XR chest 1 view   Final Result   No acute process.   Signed by Jo Veras MD      Vascular US carotid artery duplex bilateral    (Results Pending)   Vascular US lower extremity vein mapping bilateral    (Results Pending)   Vascular US upper extremity arterial duplex bilateral    (Results Pending)   Vascular US palmar arch evaluation    (Results Pending)           Assessment & Plan    Seen this morning after C. She is willing to have CABG. Imaging and labs reviewed with Dr. Hunt, who believes she would be favorable for CABG. In the setting of current symptoms, she would be best  served with operation this admission.     - Vascular studies ordered (carotid duplex, lower extremity vein mapping, upper extremity arterial mapping with cotton arch)  - No need for repeat CT chest as CT PE is satisfactory for aortic assessment  - Date of operation TBD  - If she continues to have angina at rest, recommend transfer to ICU and initiation of IV nitrate    When/if goes to surgery:  - Continue ASA, high-intensity statin, and BB (or document contraindications for use)  ----> statin intolerance, will plan for repatha at discharge  - No ACEi/ARBs in the pre-op period (at least 48 hours)  - Hold any SGLT2 inhibitors (Farxiga, Jardiance, etc.) for at least 3 days prior to cardiac surgery to prevent euglycemic DKA  - No antiplatelets other than ASA, no anticoagulants other than Heparin  - NPO after midnight, blood on hold/T&S, and preop scrubs only to be ordered once OR date is established    STS ACSD:  Procedure Type: Isolated CABG  Perioperative Outcome Estimate %  Operative Mortality 6.19%  Morbidity & Mortality 15.5%  Stroke 1.32%  Renal Failure 1.64%  Reoperation 3.69%  Prolonged Ventilation 12%  Deep Sternal Wound Infection 0.909%  Long Hospital Stay (>14 days) 5.57%  Short Hospital Stay (<6 days)* 26.5%    Patient discussed with Dr. Jerome Hunt who plans to see the patient this afternoon.    PATSY Perkins-CNP

## 2025-01-21 NOTE — CARE PLAN
The patient's goals for the shift include      The clinical goals for the shift include Patient will remain free of chest pain throughout the shift.

## 2025-01-21 NOTE — H&P
History and Physical   Pre Surgical Review (< 30 days)      History & Physical Reviewed    I have reviewed the History and Physical dated:  1/20/25   History and Physical reviewed and relevant findings noted. Patient examined to review pertinent physical  findings.: No significant changes   Home Medications Reviewed: see EMR   Allergies Reviewed: patient has allergy to atorvastatin and rosuvastatin      Home Medications  Current Outpatient Medications   Medication Instructions    gabapentin (NEURONTIN) 100 mg, oral, Nightly    lovastatin (Mevacor) 40 mg tablet 1 tablet, oral, Daily    metoprolol tartrate (LOPRESSOR) 50 mg, oral, 2 times daily    Synthroid 50 mcg, oral, Daily        Allergies  Allergies   Allergen Reactions    Atorvastatin Calcium Myalgia    House Dust Mite Itching and Other     Congestion     Pollen Extracts Itching and Other     Congestion     Rosuvastatin Calcium Myalgia       Physical Exam  Physical Exam  Constitutional:       General: She is not in acute distress.  Cardiovascular:      Rate and Rhythm: Normal rate and regular rhythm.      Comments: + pulses all extremities.  Pulmonary:      Effort: Pulmonary effort is normal. No respiratory distress.      Comments: Diminished bilaterally.  Abdominal:      General: Bowel sounds are normal.   Skin:     General: Skin is warm and dry.   Neurological:      General: No focal deficit present.      Mental Status: She is alert and oriented to person, place, and time.   Psychiatric:         Mood and Affect: Mood normal.         Behavior: Behavior normal.        Airway/Sedation Assessment     Assessment by anesthesia N/A     ·  Mouth Opening OK yes      Neck Flexibility OK yes      Loose Teeth yes   ·  Oropharyngeal Classification Grade III   ·  ASA PS Classification ASA III - Patient with severe systemic disease       Sedation Plan Moderate     Risks, benefits, and alternatives discussed with patient.     ERAS (Enhanced Recovery After  Surgery):  ·  ERAS Patient: No      Consent:   COVID-19 Consent:  ·  COVID-19 Risk Consent Surgeon has reviewed key risks related to the risk of radha COVID-19 and if they contract COVID-19 what the risks are.     Komal Mcfadden, APRN-CNP

## 2025-01-21 NOTE — PROGRESS NOTES
Cardiac Surgery    I have personally seen an evaluated the patient, her laboratory, and radiographic database. She presents with some chest pain shoveling snow, came to the hospital, and was found to have non ST myocardial infarction. She was brought to the heart catheterization laboratory where she was found to have multi vessel coronary artery disease.     We had a nice discussion regarding indications for intervention on her coronary disease, this included both percutaneous as well as open surgical approaches.   Certainly a surgical approach to her coronary revascularization is her interest and is first line therapy. She has a class one indication for surgical coronary revascularization.    She has history of bilateral carotid stenosis with carotid endarterectomy bilaterally. We will plan for carotid duplex to evaluate her stroke risk. We will plan on surgical revascularization during this hospital admission. I will follow up with further timing and logistics. age(85 years old or older)

## 2025-01-22 LAB
ANION GAP SERPL CALC-SCNC: 11 MMOL/L (ref 10–20)
BUN SERPL-MCNC: 17 MG/DL (ref 6–23)
CALCIUM SERPL-MCNC: 8.9 MG/DL (ref 8.6–10.3)
CHLORIDE SERPL-SCNC: 106 MMOL/L (ref 98–107)
CO2 SERPL-SCNC: 26 MMOL/L (ref 21–32)
CREAT SERPL-MCNC: 0.8 MG/DL (ref 0.5–1.05)
EGFRCR SERPLBLD CKD-EPI 2021: 78 ML/MIN/1.73M*2
ERYTHROCYTE [DISTWIDTH] IN BLOOD BY AUTOMATED COUNT: 13.7 % (ref 11.5–14.5)
GLUCOSE SERPL-MCNC: 95 MG/DL (ref 74–99)
HCT VFR BLD AUTO: 44.4 % (ref 36–46)
HGB BLD-MCNC: 14 G/DL (ref 12–16)
MCH RBC QN AUTO: 28.7 PG (ref 26–34)
MCHC RBC AUTO-ENTMCNC: 31.5 G/DL (ref 32–36)
MCV RBC AUTO: 91 FL (ref 80–100)
NRBC BLD-RTO: 0 /100 WBCS (ref 0–0)
PLATELET # BLD AUTO: 265 X10*3/UL (ref 150–450)
POTASSIUM SERPL-SCNC: 4.2 MMOL/L (ref 3.5–5.3)
RBC # BLD AUTO: 4.88 X10*6/UL (ref 4–5.2)
SODIUM SERPL-SCNC: 139 MMOL/L (ref 136–145)
WBC # BLD AUTO: 7.3 X10*3/UL (ref 4.4–11.3)

## 2025-01-22 PROCEDURE — 80048 BASIC METABOLIC PNL TOTAL CA: CPT

## 2025-01-22 PROCEDURE — 2500000001 HC RX 250 WO HCPCS SELF ADMINISTERED DRUGS (ALT 637 FOR MEDICARE OP): Performed by: INTERNAL MEDICINE

## 2025-01-22 PROCEDURE — 36415 COLL VENOUS BLD VENIPUNCTURE: CPT

## 2025-01-22 PROCEDURE — 2500000001 HC RX 250 WO HCPCS SELF ADMINISTERED DRUGS (ALT 637 FOR MEDICARE OP): Performed by: NURSE PRACTITIONER

## 2025-01-22 PROCEDURE — 85027 COMPLETE CBC AUTOMATED: CPT

## 2025-01-22 PROCEDURE — 1200000002 HC GENERAL ROOM WITH TELEMETRY DAILY

## 2025-01-22 PROCEDURE — 99233 SBSQ HOSP IP/OBS HIGH 50: CPT | Performed by: NURSE PRACTITIONER

## 2025-01-22 RX ADMIN — METOPROLOL TARTRATE 50 MG: 50 TABLET, FILM COATED ORAL at 22:24

## 2025-01-22 RX ADMIN — METOPROLOL TARTRATE 50 MG: 50 TABLET, FILM COATED ORAL at 09:16

## 2025-01-22 RX ADMIN — ALPRAZOLAM 0.5 MG: 0.25 TABLET ORAL at 09:16

## 2025-01-22 RX ADMIN — ACETAMINOPHEN 650 MG: 325 TABLET, FILM COATED ORAL at 22:27

## 2025-01-22 RX ADMIN — LOVASTATIN 40 MG: 40 TABLET ORAL at 09:16

## 2025-01-22 RX ADMIN — ALPRAZOLAM 0.5 MG: 0.25 TABLET ORAL at 22:24

## 2025-01-22 RX ADMIN — ASPIRIN 81 MG CHEWABLE TABLET 81 MG: 81 TABLET CHEWABLE at 09:16

## 2025-01-22 ASSESSMENT — COGNITIVE AND FUNCTIONAL STATUS - GENERAL
DAILY ACTIVITIY SCORE: 24
MOBILITY SCORE: 24

## 2025-01-22 ASSESSMENT — ENCOUNTER SYMPTOMS
COUGH: 0
BLURRED VISION: 0
SHORTNESS OF BREATH: 0
NAUSEA: 0
FOCAL WEAKNESS: 0
POOR WOUND HEALING: 0
MUSCLE CRAMPS: 0
DYSPNEA ON EXERTION: 0
DECREASED APPETITE: 0
NAIL CHANGES: 0
PALPITATIONS: 0
FREQUENCY: 0
MYALGIAS: 0
ORTHOPNEA: 0
VOMITING: 0
ALTERED MENTAL STATUS: 0
BLOATING: 0
STIFFNESS: 0
IRREGULAR HEARTBEAT: 0
LIGHT-HEADEDNESS: 0
DOUBLE VISION: 0
WEAKNESS: 0
DIZZINESS: 0

## 2025-01-22 ASSESSMENT — PAIN SCALES - GENERAL
PAINLEVEL_OUTOF10: 0 - NO PAIN
PAINLEVEL_OUTOF10: 2
PAINLEVEL_OUTOF10: 0 - NO PAIN

## 2025-01-22 ASSESSMENT — PAIN - FUNCTIONAL ASSESSMENT
PAIN_FUNCTIONAL_ASSESSMENT: 0-10
PAIN_FUNCTIONAL_ASSESSMENT: 0-10

## 2025-01-22 ASSESSMENT — PAIN DESCRIPTION - DESCRIPTORS: DESCRIPTORS: ACHING

## 2025-01-22 NOTE — CARE PLAN
The patient's goals for the shift include  less anxiety    The clinical goals for the shift include Patient will remain free of chest pain throughout the shift.

## 2025-01-22 NOTE — PROGRESS NOTES
Cardiology Progress    Impression:  Unstable angina.  Cardiac cath showing severe three-vessel disease.  Uncontrolled hypertension  Uncontrolled hyperlipidemia.  Intolerant to multiple statins.  Obesity  Carotid disease.  Bilateral carotid endarterectomy  Plan:  Continue aspirin, statin, beta-blocker.  [id number]  Accepted for CABG,  Date pending.  HPI:  No problems overnight.  No more angina  Meds:  Scheduled medications  aspirin, 81 mg, oral, Daily  enoxaparin, 40 mg, subcutaneous, q24h  lovastatin, 40 mg, oral, Daily  metoprolol tartrate, 50 mg, oral, BID      Continuous medications     PRN medications  PRN medications: acetaminophen **OR** acetaminophen **OR** acetaminophen, ALPRAZolam, HYDROmorphone    Physical exam:  Vitals:    01/22/25 1005   BP: 115/62   Pulse: 62   Resp: 18   Temp: 36.7 °C (98.1 °F)   SpO2: 92%      No JVD.  Chest clear.  Right radial arteriotomy intact.  EKG:  Telemetry shows sinus rhythm  Echo:  Normal EF.  Moderate MR.  Labs:  Lab Results   Component Value Date    WBC 7.3 01/22/2025    HGB 14.0 01/22/2025    HCT 44.4 01/22/2025     01/22/2025    CHOL 398 (H) 01/20/2025    TRIG 253 (H) 01/20/2025    HDL 62.3 01/20/2025    ALT 16 01/20/2025    AST 17 01/20/2025     01/22/2025    K 4.2 01/22/2025     01/22/2025    CREATININE 0.80 01/22/2025    BUN 17 01/22/2025    CO2 26 01/22/2025    TSH 7.90 (H) 01/20/2025    INR 1.0 01/20/2025    HGBA1C 5.8 (H) 01/20/2025     par

## 2025-01-22 NOTE — PROGRESS NOTES
[facility]   Cardiothoracic Surgery   Progress Note        Jessy Garcia is a 75 y.o. female on day 1 of admission presenting with Chest pain, unspecified type.    Subjective     Interval HPI: Seen and assessed patient this morning while they were sitting up in bed; in no acute distress and without anginal complaints.     Review of Systems   Constitutional: Negative for decreased appetite and malaise/fatigue.   HENT:  Negative for congestion.    Eyes:  Negative for blurred vision and double vision.   Cardiovascular:  Negative for chest pain, dyspnea on exertion, irregular heartbeat, leg swelling, orthopnea and palpitations.   Respiratory:  Negative for cough and shortness of breath.    Endocrine: Negative for cold intolerance and heat intolerance.   Skin:  Negative for nail changes, poor wound healing and rash.   Musculoskeletal:  Negative for arthritis, muscle cramps, muscle weakness, myalgias and stiffness.   Gastrointestinal:  Negative for bloating, nausea and vomiting.   Genitourinary:  Negative for frequency, hesitancy and urgency.   Neurological:  Negative for dizziness, focal weakness, light-headedness and weakness.   Psychiatric/Behavioral:  Negative for altered mental status.    Allergic/Immunologic: Negative for environmental allergies.         Objective   Physical Exam  Physical Exam  Vitals and nursing note reviewed.   Constitutional:       General: She is not in acute distress.     Appearance: Normal appearance. She is not ill-appearing.   HENT:      Head: Normocephalic and atraumatic.      Nose: Nose normal.      Mouth/Throat:      Mouth: Mucous membranes are moist.      Pharynx: Oropharynx is clear.   Eyes:      Extraocular Movements: Extraocular movements intact.      Conjunctiva/sclera: Conjunctivae normal.      Pupils: Pupils are equal, round, and reactive to light.   Cardiovascular:      Rate and Rhythm: Normal rate and regular rhythm.      Pulses: Normal pulses.       Heart sounds: Normal heart sounds, S1 normal and S2 normal. No murmur heard.     No systolic murmur is present.      No friction rub. No gallop.   Pulmonary:      Effort: Pulmonary effort is normal.      Breath sounds: Normal breath sounds.   Abdominal:      General: Abdomen is flat. Bowel sounds are normal. There is no distension.      Palpations: Abdomen is soft.   Musculoskeletal:         General: Normal range of motion.      Cervical back: Normal range of motion and neck supple.      Right lower leg: No edema.      Left lower leg: No edema.   Skin:     General: Skin is warm and dry.      Capillary Refill: Capillary refill takes less than 2 seconds.      Findings: No lesion.      Nails: There is no clubbing.   Neurological:      General: No focal deficit present.      Mental Status: She is alert and oriented to person, place, and time. Mental status is at baseline.      Cranial Nerves: Cranial nerves 2-12 are intact.      Sensory: Sensation is intact.      Motor: Motor function is intact.   Psychiatric:         Attention and Perception: Attention and perception normal.         Mood and Affect: Mood normal.         Speech: Speech normal.         Behavior: Behavior normal.         Thought Content: Thought content normal.         Cognition and Memory: Cognition and memory normal.         Judgment: Judgment normal.       Last Recorded Vitals  Vitals:    01/22/25 0123 01/22/25 0612 01/22/25 0916 01/22/25 1005   BP: 105/57 121/64 (!) 149/92 115/62   BP Location:    Left arm   Patient Position:    Lying   Pulse: 58 61 81 62   Resp: 16 18  18   Temp: 35.2 °C (95.4 °F) 36.1 °C (97 °F)  36.7 °C (98.1 °F)   TempSrc:    Temporal   SpO2: 94% 94%  92%   Weight:       Height:            Intake/Output last 3 Shifts:  I/O last 3 completed shifts:  In: - (0 mL/kg)   Out: 5 (0.1 mL/kg) [Blood:5]  Weight: 89.7 kg     Inpatient Medications  aspirin, 81 mg, oral, Daily  enoxaparin, 40 mg, subcutaneous, q24h  lovastatin, 40 mg,  oral, Daily  metoprolol tartrate, 50 mg, oral, BID      Continuous medications     PRN medications  PRN medications: acetaminophen **OR** acetaminophen **OR** acetaminophen, ALPRAZolam, HYDROmorphone     LDA:       Relevant Results  Lab Review  Results from last 7 days   Lab Units 01/22/25  0715   WBC AUTO x10*3/uL 7.3   HEMOGLOBIN g/dL 14.0   HEMATOCRIT % 44.4   PLATELETS AUTO x10*3/uL 265     Results from last 7 days   Lab Units 01/22/25  0715 01/20/25  0148   SODIUM mmol/L 139 138   POTASSIUM mmol/L 4.2 4.2   CHLORIDE mmol/L 106 103   CO2 mmol/L 26 27   BUN mg/dL 17 21   CREATININE mg/dL 0.80 0.86   CALCIUM mg/dL 8.9 9.9   PROTEIN TOTAL g/dL  --  8.3*   BILIRUBIN TOTAL mg/dL  --  0.2   ALK PHOS U/L  --  89   ALT U/L  --  16   AST U/L  --  17   GLUCOSE mg/dL 95 118*     Results from last 7 days   Lab Units 01/20/25  0148   MAGNESIUM mg/dL 2.12             Radiographic Testing    Echo:  Transthoracic Echo (TTE) Complete 01/20/2025  PHYSICIAN INTERPRETATION:  Left Ventricle: Left ventricular ejection fraction is normal, by visual estimate at 55-60%. There are no regional left ventricular wall motion abnormalities. The left ventricular cavity size is normal. There is mildly increased septal and mildly increased posterior left ventricular wall thickness. There is left ventricular concentric remodeling. Spectral Doppler shows a Grade I (impaired relaxation pattern) of left ventricular diastolic filling with normal left atrial filling pressure.  Left Atrium: The left atrium is normal in size.  Right Ventricle: The right ventricle is normal in size. There is normal right ventricular global systolic function.  Right Atrium: The right atrium is normal in size.  Aortic Valve: The aortic valve is trileaflet. There is mild aortic valve thickening. There is evidence of mildly elevated transaortic gradients consistent with sclerosis of the aortic valve. The aortic valve dimensionless index is 0.65. There is trace aortic valve  regurgitation. The peak instantaneous gradient of the aortic valve is 12 mmHg. The mean gradient of the aortic valve is 8 mmHg.  Mitral Valve: The mitral valve is mildly thickened. The peak instantaneous gradient of the mitral valve is 8 mmHg. There is moderate mitral valve regurgitation. The mitral regurgitant orifice area is 7 mm2. The mitral regurgitant volume is 15.75 ml.  Tricuspid Valve: The tricuspid valve is structurally normal. There is trace tricuspid regurgitation.  Pulmonic Valve: The pulmonic valve is structurally normal. There is trace pulmonic valve regurgitation.  Pericardium: There is no pericardial effusion noted.  Aorta: The aortic root is normal.  Systemic Veins: The inferior vena cava appears normal in size.  In comparison to the previous echocardiogram(s): Compared with study dated 5/16/2011,.        CONCLUSIONS:   1. Left ventricular ejection fraction is normal, by visual estimate at 55-60%.   2. Spectral Doppler shows a Grade I (impaired relaxation pattern) of left ventricular diastolic filling with normal left atrial filling pressure.   3. There is normal right ventricular global systolic function.   4. Moderate mitral valve regurgitation.   5. Aortic valve sclerosis.    Ejection Fractions:  EF   Date/Time Value Ref Range Status   01/20/2025 02:15 PM 58 %      Cath:  Cardiac Catheterization Procedure 01/21/2025  Coronary Angiography Comments:  64-year-old woman with a history of bilateral carotid endarterectomy, uncontrolled hypertension, uncontrolled hyperlipidemia presents with unstable angina. Urgent heart cath recommended.     Fluoroscopy demonstrated moderate calcification of the coronary tree.     Left main: Short vessel. No significant disease.  LAD: Large vessel. Tortuous and calcified. In the mid LAD there is a complex 80 to 90% stenosis involving a large diagonal and moderate-sized septal. The remainder the LAD has moderate disease.  Circumflex: Moderate size vessel. Proximal 80%  "stenosis. Remainder the vessel is free of disease.  RCA: Dominant vessel. Ostial 90% stenosis.  LVEDP 21 mmHg. Left atrial angiography not performed.    Imaging:  Vascular US upper extremity arterial duplex bilateral         Vascular US lower extremity vein mapping bilateral         Vascular US carotid artery duplex bilateral         Vascular US palmar arch evaluation         Cardiac Catheterization Procedure   Final Result      Transthoracic Echo (TTE) Complete   Final Result      CT angio chest for pulmonary embolism   Final Result   1.No CT evidence of pulmonary embolism.   2.Normal aorta.   3.Small pulmonary nodule in the right upper lobe measuring 0.4   cm. As an isolated phenomena likely of little significance but   correlate with any malignancy history.   4.No evidence of pulmonary artery filling defect to suggest a   pulmonary embolism.   5.No evidence of aortic dissection or aneurysmal dilatation.   6.Small distal esophageal hiatal hernia.   7.Atherosclerotic changes of the abdominal aorta and its   branches. There is some narrowing of the proximal celiac axis   estimated be approximately 50-60%.   Signed by Albino Ritchie MD      XR chest 1 view   Final Result   No acute process.   Signed by Jo Veras MD                History of Present Illness: Jessy Garcia is a 75 y.o. female with a PMH significant for HTN, HLD, hypothyroidism, carotid stenosis s/p bilateral CEA, obesity who presented to [facility] on 1/20/2025 with NSTEMI. Patient reports the pain initially began approximately 4 days ago after shoveling snow. She reports the pain radiates across her chest she describes the pain as a \"hurt\"; severity 8/10 at its worst. She does report the pain is more noticeable with minimal exertion but also states the pain occurs at rest. She does report some associated shortness of breath, she denies any nausea, vomiting, diaphoresis. She denies any recent fevers chills, " abdominal pain, headache, dizziness. She reports she is intermittently compliant with her blood pressure and cholesterol medications.      ED course: significant for HS tropnin (36), D-dimer 717 with CT PE negative for PE.     LHC performed 1/21 by Dr. Palacio and she was found to have triple vessel disease. Cardiac surgery was consulted for CABG evaluation.     Daily Events    1/22/25: No acute events overnight; currently working on staging an operative date. Continue inpatient admission given NSTEMI.      Assessment & Plan       Multivessel CAD   - Patient admitted with NSTMEI on 1/20/25  --> LHC on 1/21/25 revealed diffuse multivessel disease including the mid LAD, prox-Lcx and ostial RCA  --> Coronary pathology would favor bypass grafting  - Continue on ASA 81mg, metoprolol tartrate 50mg BID and lovastatin 40mg   - Pre-operative workup pending   - OR date pending     Above patient seen and plan discussed with Dr. Jerome Hunt, plan as above.     Adam Dumas, PATSY-CNP

## 2025-01-22 NOTE — CARE PLAN
The clinical goals for the shift include Patient will remain free of chest pain throughout the shift.

## 2025-01-22 NOTE — NURSING NOTE
"Per Cardiothoracic NP's note by Kylah Roque yesterday- \"no anticoagulants other than heparin.\" Reached out to JUANITO Dumas today @ 0883 regarding this since patient has daily lovenox ordered. Per JUANITO Medina @ 6963 the lovenox is fine to give.     Angelo Saldana LPN  1/22/25 @ 4280  "

## 2025-01-22 NOTE — PROGRESS NOTES
01/22/25 1018   Discharge Planning   Living Arrangements Children   Support Systems Children   Assistance Needed none   Type of Residence Private residence   Number of Stairs to Enter Residence 1   Number of Stairs Within Residence 0   Expected Discharge Disposition Home H   Does the patient need discharge transport arranged? No   Intensity of Service   Intensity of Service 0-30 min     1/22/2025  Met with pt in room, introduced self and explained roll.  Verified insurance, address, phone.   [place] listed. Pt stated she also has Medicare-- made copy of card Medicare card and faxed to admitting to add.    PCP- stated she has seen one through  a few times, but it thinking about changing. Stated she swill find a new one on her own. Declined list.   Pharmacy- [facility]  Interested in Meds to Bed  Pt is from home, lives with daughter.  Stated she is independent. No use of assistive devices. (Does not have any devices at home). Performs own ADL's. No bars or seat in bathroom. Still drives. Cares for self and  home.  Stated she will have help from family if needed.    Per notes- plan is for a CABG. (Unsure of date yet).  Discussed possible needs after discharge- like HHC.  Pt stated she does not think she will need it.  Discussed why MD's order for after discharge.  Stated she will think about it.  Support provided and questions answered.   CT Team will continue to follow.   Mary Garcia RN T[facility]

## 2025-01-23 ENCOUNTER — ANESTHESIA EVENT (OUTPATIENT)
Dept: OPERATING ROOM | Facility: HOSPITAL | Age: 75
End: 2025-01-23
Payer: MEDICARE

## 2025-01-23 DIAGNOSIS — R93.1 ABNORMAL FINDINGS ON DIAGNOSTIC IMAGING OF HEART AND CORONARY CIRCULATION: ICD-10-CM

## 2025-01-23 DIAGNOSIS — I24.9 ACS (ACUTE CORONARY SYNDROME) (MULTI): Primary | ICD-10-CM

## 2025-01-23 DIAGNOSIS — I20.81 ANGINA PECTORIS WITH CORONARY MICROVASCULAR DYSFUNCTION (CMS-HCC): ICD-10-CM

## 2025-01-23 PROBLEM — I21.4 NSTEMI (NON-ST ELEVATED MYOCARDIAL INFARCTION) (MULTI): Status: ACTIVE | Noted: 2025-01-19

## 2025-01-23 LAB
ABO GROUP (TYPE) IN BLOOD: NORMAL
ABO GROUP (TYPE) IN BLOOD: NORMAL
ANTIBODY SCREEN: NORMAL
RH FACTOR (ANTIGEN D): NORMAL
RH FACTOR (ANTIGEN D): NORMAL

## 2025-01-23 PROCEDURE — 2500000001 HC RX 250 WO HCPCS SELF ADMINISTERED DRUGS (ALT 637 FOR MEDICARE OP): Performed by: NURSE PRACTITIONER

## 2025-01-23 PROCEDURE — 36415 COLL VENOUS BLD VENIPUNCTURE: CPT | Performed by: THORACIC SURGERY (CARDIOTHORACIC VASCULAR SURGERY)

## 2025-01-23 PROCEDURE — 2500000001 HC RX 250 WO HCPCS SELF ADMINISTERED DRUGS (ALT 637 FOR MEDICARE OP): Performed by: INTERNAL MEDICINE

## 2025-01-23 PROCEDURE — 2020000001 HC ICU ROOM DAILY

## 2025-01-23 PROCEDURE — 86901 BLOOD TYPING SEROLOGIC RH(D): CPT | Performed by: THORACIC SURGERY (CARDIOTHORACIC VASCULAR SURGERY)

## 2025-01-23 PROCEDURE — 99233 SBSQ HOSP IP/OBS HIGH 50: CPT | Performed by: NURSE PRACTITIONER

## 2025-01-23 PROCEDURE — 86923 COMPATIBILITY TEST ELECTRIC: CPT

## 2025-01-23 RX ORDER — LEVOTHYROXINE SODIUM 50 UG/1
50 TABLET ORAL DAILY
Status: DISCONTINUED | OUTPATIENT
Start: 2025-01-23 | End: 2025-01-29 | Stop reason: HOSPADM

## 2025-01-23 RX ORDER — GABAPENTIN 100 MG/1
100 CAPSULE ORAL NIGHTLY
Status: DISCONTINUED | OUTPATIENT
Start: 2025-01-23 | End: 2025-01-24

## 2025-01-23 RX ADMIN — ALPRAZOLAM 0.5 MG: 0.25 TABLET ORAL at 11:26

## 2025-01-23 RX ADMIN — ASPIRIN 81 MG CHEWABLE TABLET 81 MG: 81 TABLET CHEWABLE at 09:13

## 2025-01-23 RX ADMIN — ALPRAZOLAM 0.5 MG: 0.25 TABLET ORAL at 21:24

## 2025-01-23 RX ADMIN — ACETAMINOPHEN 650 MG: 325 TABLET, FILM COATED ORAL at 11:26

## 2025-01-23 RX ADMIN — LOVASTATIN 40 MG: 40 TABLET ORAL at 09:13

## 2025-01-23 RX ADMIN — METOPROLOL TARTRATE 50 MG: 50 TABLET, FILM COATED ORAL at 15:26

## 2025-01-23 SDOH — HEALTH STABILITY: MENTAL HEALTH: CURRENT SMOKER: 0

## 2025-01-23 ASSESSMENT — ENCOUNTER SYMPTOMS
FREQUENCY: 0
NAIL CHANGES: 0
VOMITING: 0
DYSPNEA ON EXERTION: 0
POOR WOUND HEALING: 0
ORTHOPNEA: 0
DOUBLE VISION: 0
COUGH: 0
BLOATING: 0
WEAKNESS: 0
DECREASED APPETITE: 0
BLURRED VISION: 0
PALPITATIONS: 0
DIZZINESS: 0
MYALGIAS: 0
FOCAL WEAKNESS: 0
SHORTNESS OF BREATH: 0
ALTERED MENTAL STATUS: 0
MUSCLE CRAMPS: 0
STIFFNESS: 0
IRREGULAR HEARTBEAT: 0
LIGHT-HEADEDNESS: 0
NAUSEA: 0

## 2025-01-23 ASSESSMENT — PAIN SCALES - GENERAL
PAINLEVEL_OUTOF10: 2
PAINLEVEL_OUTOF10: 0 - NO PAIN
PAINLEVEL_OUTOF10: 0 - NO PAIN

## 2025-01-23 ASSESSMENT — COGNITIVE AND FUNCTIONAL STATUS - GENERAL
DAILY ACTIVITIY SCORE: 24
MOBILITY SCORE: 24

## 2025-01-23 ASSESSMENT — PAIN - FUNCTIONAL ASSESSMENT: PAIN_FUNCTIONAL_ASSESSMENT: 0-10

## 2025-01-23 ASSESSMENT — PAIN DESCRIPTION - LOCATION: LOCATION: HEAD

## 2025-01-23 NOTE — ANESTHESIA PREPROCEDURE EVALUATION
Patient: Jessy Garcia    Procedure Information       Date/Time: 01/24/25 1014    Procedure: CORONARY ARTERY BYPASS GRAFT x3-4    Location: [facility] OR 10 / Virtual [facility] OR    Surgeons: Deyvi Gonzalez MD            Relevant Problems   Anesthesia (within normal limits)      Cardiac   (+) Chest pain, unspecified type   (+) NSTEMI (non-ST elevated myocardial infarction) (Multi)      Circulatory   (+) ACS (acute coronary syndrome) (Multi)       Clinical information reviewed:    Allergies  Meds   Med Hx  Surg Hx  OB Status  Fam Hx  Soc Hx        NPO Detail:  No data recorded     Physical Exam    Airway  Mallampati: II  TM distance: >3 FB  Neck ROM: full     Cardiovascular - normal exam  (+) murmur     Dental - normal exam     Pulmonary - normal exam     Abdominal   (+) obese             Anesthesia Plan    History of general anesthesia?: yes  History of complications of general anesthesia?: no    ASA 4     general   (Right Brachial art line. RIJ CVP. BAKARI .SAP Block)  The patient is not a current smoker.  Patient was previously instructed to abstain from smoking on day of procedure.  Patient did not smoke on day of procedure.    intravenous induction   Postoperative administration of opioids is intended.  Anesthetic plan and risks discussed with patient.  Use of blood products discussed with patient who consented to blood products.    Plan discussed with CAA.

## 2025-01-23 NOTE — CARE PLAN
The patient's goals for the shift include      The clinical goals for the shift include Patient will remain free of chest pain throughout the shift.      Problem: Skin  Goal: Promote/optimize nutrition  Outcome: Progressing     Problem: Pain  Goal: Takes deep breaths with improved pain control throughout the shift  Outcome: Progressing  Goal: Turns in bed with improved pain control throughout the shift  Outcome: Progressing  Goal: Walks with improved pain control throughout the shift  Outcome: Progressing  Goal: Performs ADL's with improved pain control throughout shift  Outcome: Progressing  Goal: Participates in PT with improved pain control throughout the shift  Outcome: Progressing  Goal: Free from opioid side effects throughout the shift  Outcome: Progressing  Goal: Free from acute confusion related to pain meds throughout the shift  Outcome: Progressing     Problem: Safety - Adult  Goal: Free from fall injury  Outcome: Progressing     Problem: Pain - Adult  Goal: Verbalizes/displays adequate comfort level or baseline comfort level  Outcome: Progressing     Problem: Discharge Planning  Goal: Discharge to home or other facility with appropriate resources  Outcome: Progressing       Over the shift, the patient did not make progress toward the following goals. Barriers to progression include

## 2025-01-23 NOTE — CARE PLAN
The patient's goals for the shift include      The clinical goals for the shift include Pt will remain safe and free from chest pain throughout shift    Over the shift, the patient did not make progress toward the following goals. Barriers to progression include acute illness. Recommendations to address these barriers include communication.

## 2025-01-23 NOTE — PROGRESS NOTES
Cardiology Progress    Impression:  Unstable angina.  Cardiac cath showing severe three-vessel disease.  Uncontrolled hypertension  Uncontrolled hyperlipidemia.  Intolerant to multiple statins.  Obesity  Carotid disease.  Bilateral carotid endarterectomy  Plan:  Continue aspirin, statin, beta-blocker.  [id number]  Accepted for CABG,  Date pending.  HPI:  No problems overnight.  No angina.  Ambulating on the unit.  Meds:  Scheduled medications  aspirin, 81 mg, oral, Daily  enoxaparin, 40 mg, subcutaneous, q24h  lovastatin, 40 mg, oral, Daily  metoprolol tartrate, 50 mg, oral, BID      Continuous medications     PRN medications  PRN medications: acetaminophen **OR** acetaminophen **OR** acetaminophen, ALPRAZolam, HYDROmorphone    Physical exam:  Vitals:    01/23/25 0535   BP: 144/70   Pulse: 57   Resp:    Temp: 35.1 °C (95.2 °F)   SpO2: 95%      No JVD.  Chest clear.  No edema.  EKG:  Telemetry shows sinus rhythm.  Echo:  Normal EF.  Moderate MR.  Labs:  Lab Results   Component Value Date    WBC 7.3 01/22/2025    HGB 14.0 01/22/2025    HCT 44.4 01/22/2025     01/22/2025    CHOL 398 (H) 01/20/2025    TRIG 253 (H) 01/20/2025    HDL 62.3 01/20/2025    ALT 16 01/20/2025    AST 17 01/20/2025     01/22/2025    K 4.2 01/22/2025     01/22/2025    CREATININE 0.80 01/22/2025    BUN 17 01/22/2025    CO2 26 01/22/2025    TSH 7.90 (H) 01/20/2025    INR 1.0 01/20/2025    HGBA1C 5.8 (H) 01/20/2025     par

## 2025-01-24 ENCOUNTER — ANESTHESIA (OUTPATIENT)
Dept: OPERATING ROOM | Facility: HOSPITAL | Age: 75
End: 2025-01-24
Payer: MEDICARE

## 2025-01-24 ENCOUNTER — APPOINTMENT (OUTPATIENT)
Dept: CARDIOLOGY | Facility: HOSPITAL | Age: 75
DRG: 234 | End: 2025-01-24
Payer: MEDICARE

## 2025-01-24 ENCOUNTER — APPOINTMENT (OUTPATIENT)
Dept: RADIOLOGY | Facility: HOSPITAL | Age: 75
DRG: 234 | End: 2025-01-24
Payer: MEDICARE

## 2025-01-24 LAB
ACT BLD: 104 SEC (ref 82–174)
ACT BLD: 115 SEC (ref 82–174)
ACT BLD: 581 SEC (ref 82–174)
ACT BLD: 615 SEC (ref 82–174)
ACT BLD: NORMAL S
ALBUMIN SERPL BCP-MCNC: 3.2 G/DL (ref 3.4–5)
ANION GAP BLDA CALCULATED.4IONS-SCNC: 11 MMO/L (ref 10–25)
ANION GAP BLDA CALCULATED.4IONS-SCNC: 11 MMO/L (ref 10–25)
ANION GAP BLDA CALCULATED.4IONS-SCNC: 12 MMO/L (ref 10–25)
ANION GAP BLDA CALCULATED.4IONS-SCNC: 13 MMO/L (ref 10–25)
ANION GAP BLDA CALCULATED.4IONS-SCNC: 13 MMO/L (ref 10–25)
ANION GAP BLDA CALCULATED.4IONS-SCNC: 14 MMO/L (ref 10–25)
ANION GAP BLDA CALCULATED.4IONS-SCNC: 9 MMO/L (ref 10–25)
ANION GAP BLDA CALCULATED.4IONS-SCNC: 9 MMO/L (ref 10–25)
ANION GAP SERPL CALC-SCNC: 15 MMOL/L (ref 10–20)
APTT PPP: 29 SECONDS (ref 27–38)
APTT PPP: 40 SECONDS (ref 27–38)
BASE EXCESS BLDA CALC-SCNC: -1.9 MMOL/L (ref -2–3)
BASE EXCESS BLDA CALC-SCNC: -2.2 MMOL/L (ref -2–3)
BASE EXCESS BLDA CALC-SCNC: -2.2 MMOL/L (ref -2–3)
BASE EXCESS BLDA CALC-SCNC: 0.2 MMOL/L (ref -2–3)
BASE EXCESS BLDA CALC-SCNC: 0.5 MMOL/L (ref -2–3)
BASE EXCESS BLDA CALC-SCNC: 1.3 MMOL/L (ref -2–3)
BASE EXCESS BLDA CALC-SCNC: 1.6 MMOL/L (ref -2–3)
BASE EXCESS BLDA CALC-SCNC: 2 MMOL/L (ref -2–3)
BASE EXCESS BLDA CALC-SCNC: 4.4 MMOL/L (ref -2–3)
BLOOD EXPIRATION DATE: NORMAL
BODY TEMPERATURE: 37 DEGREES CELSIUS
BUN SERPL-MCNC: 14 MG/DL (ref 6–23)
CA-I BLD-SCNC: 1.03 MMOL/L (ref 1.1–1.33)
CA-I BLDA-SCNC: 0.87 MMOL/L (ref 1.1–1.33)
CA-I BLDA-SCNC: 1.03 MMOL/L (ref 1.1–1.33)
CA-I BLDA-SCNC: 1.05 MMOL/L (ref 1.1–1.33)
CA-I BLDA-SCNC: 1.06 MMOL/L (ref 1.1–1.33)
CA-I BLDA-SCNC: 1.07 MMOL/L (ref 1.1–1.33)
CA-I BLDA-SCNC: 1.18 MMOL/L (ref 1.1–1.33)
CA-I BLDA-SCNC: 1.19 MMOL/L (ref 1.1–1.33)
CA-I BLDA-SCNC: 1.29 MMOL/L (ref 1.1–1.33)
CALCIUM SERPL-MCNC: 7.8 MG/DL (ref 8.6–10.3)
CHLORIDE BLDA-SCNC: 103 MMOL/L (ref 98–107)
CHLORIDE BLDA-SCNC: 103 MMOL/L (ref 98–107)
CHLORIDE BLDA-SCNC: 104 MMOL/L (ref 98–107)
CHLORIDE BLDA-SCNC: 108 MMOL/L (ref 98–107)
CHLORIDE BLDA-SCNC: 109 MMOL/L (ref 98–107)
CHLORIDE BLDA-SCNC: 111 MMOL/L (ref 98–107)
CHLORIDE SERPL-SCNC: 110 MMOL/L (ref 98–107)
CO2 SERPL-SCNC: 23 MMOL/L (ref 21–32)
CREAT SERPL-MCNC: 0.79 MG/DL (ref 0.5–1.05)
CRITICAL CALL TIME: 1039
CRITICAL CALLED BY: ABNORMAL
CRITICAL CALLED TO: ABNORMAL
CRITICAL NOTE: ABNORMAL
CRITICAL READ BACK: ABNORMAL
DISPENSE STATUS: NORMAL
EGFRCR SERPLBLD CKD-EPI 2021: 79 ML/MIN/1.73M*2
ERYTHROCYTE [DISTWIDTH] IN BLOOD BY AUTOMATED COUNT: 13.2 % (ref 11.5–14.5)
FIBRINOGEN PPP-MCNC: 158 MG/DL (ref 200–400)
FIBRINOGEN PPP-MCNC: 176 MG/DL (ref 200–400)
FREQUENCY (BPM): 17 BPM
GLUCOSE BLD MANUAL STRIP-MCNC: 134 MG/DL (ref 74–99)
GLUCOSE BLD MANUAL STRIP-MCNC: 140 MG/DL (ref 74–99)
GLUCOSE BLD MANUAL STRIP-MCNC: 153 MG/DL (ref 74–99)
GLUCOSE BLDA-MCNC: 100 MG/DL (ref 74–99)
GLUCOSE BLDA-MCNC: 107 MG/DL (ref 74–99)
GLUCOSE BLDA-MCNC: 112 MG/DL (ref 74–99)
GLUCOSE BLDA-MCNC: 149 MG/DL (ref 74–99)
GLUCOSE BLDA-MCNC: 151 MG/DL (ref 74–99)
GLUCOSE BLDA-MCNC: 151 MG/DL (ref 74–99)
GLUCOSE BLDA-MCNC: 159 MG/DL (ref 74–99)
GLUCOSE BLDA-MCNC: 167 MG/DL (ref 74–99)
GLUCOSE SERPL-MCNC: 158 MG/DL (ref 74–99)
HCO3 BLDA-SCNC: 21.7 MMOL/L (ref 22–26)
HCO3 BLDA-SCNC: 22.4 MMOL/L (ref 22–26)
HCO3 BLDA-SCNC: 22.4 MMOL/L (ref 22–26)
HCO3 BLDA-SCNC: 24.6 MMOL/L (ref 22–26)
HCO3 BLDA-SCNC: 25.4 MMOL/L (ref 22–26)
HCO3 BLDA-SCNC: 25.8 MMOL/L (ref 22–26)
HCO3 BLDA-SCNC: 25.9 MMOL/L (ref 22–26)
HCO3 BLDA-SCNC: 26.5 MMOL/L (ref 22–26)
HCO3 BLDA-SCNC: 29.2 MMOL/L (ref 22–26)
HCT VFR BLD AUTO: 29.8 % (ref 36–46)
HCT VFR BLD EST: 25 % (ref 36–46)
HCT VFR BLD EST: 29 % (ref 36–46)
HCT VFR BLD EST: 30 % (ref 36–46)
HCT VFR BLD EST: 31 % (ref 36–46)
HCT VFR BLD EST: 34 % (ref 36–46)
HCT VFR BLD EST: 42 % (ref 36–46)
HGB BLD-MCNC: 9.9 G/DL (ref 12–16)
HGB BLDA-MCNC: 10 G/DL (ref 12–16)
HGB BLDA-MCNC: 10.1 G/DL (ref 12–16)
HGB BLDA-MCNC: 10.1 G/DL (ref 12–16)
HGB BLDA-MCNC: 10.2 G/DL (ref 12–16)
HGB BLDA-MCNC: 11.2 G/DL (ref 12–16)
HGB BLDA-MCNC: 13.9 G/DL (ref 12–16)
HGB BLDA-MCNC: 8.3 G/DL (ref 12–16)
HGB BLDA-MCNC: 9.6 G/DL (ref 12–16)
INHALED O2 CONCENTRATION: 100 %
INHALED O2 CONCENTRATION: 100 %
INHALED O2 CONCENTRATION: 30 %
INHALED O2 CONCENTRATION: 40 %
INHALED O2 CONCENTRATION: 50 %
INHALED O2 CONCENTRATION: 80 %
INHALED O2 CONCENTRATION: 90 %
INR PPP: 1.4 (ref 0.9–1.1)
INR PPP: 1.8 (ref 0.9–1.1)
LACTATE BLDA-SCNC: 1.1 MMOL/L (ref 0.4–2)
LACTATE BLDA-SCNC: 1.2 MMOL/L (ref 0.4–2)
LACTATE BLDA-SCNC: 1.3 MMOL/L (ref 0.4–2)
LACTATE BLDA-SCNC: 1.3 MMOL/L (ref 0.4–2)
LACTATE BLDA-SCNC: 1.4 MMOL/L (ref 0.4–2)
LACTATE BLDA-SCNC: 1.6 MMOL/L (ref 0.4–2)
LACTATE BLDA-SCNC: 1.9 MMOL/L (ref 0.4–2)
LACTATE BLDA-SCNC: 2.4 MMOL/L (ref 0.4–2)
MAGNESIUM SERPL-MCNC: 2.75 MG/DL (ref 1.6–2.4)
MAGNESIUM SERPL-MCNC: 3.7 MG/DL (ref 1.6–2.4)
MCH RBC QN AUTO: 29 PG (ref 26–34)
MCHC RBC AUTO-ENTMCNC: 33.2 G/DL (ref 32–36)
MCV RBC AUTO: 87 FL (ref 80–100)
NRBC BLD-RTO: 0 /100 WBCS (ref 0–0)
OXYHGB MFR BLDA: 93 % (ref 94–98)
OXYHGB MFR BLDA: 94.9 % (ref 94–98)
OXYHGB MFR BLDA: 97 % (ref 94–98)
OXYHGB MFR BLDA: 97.2 % (ref 94–98)
OXYHGB MFR BLDA: 97.5 % (ref 94–98)
OXYHGB MFR BLDA: 97.5 % (ref 94–98)
OXYHGB MFR BLDA: 97.6 % (ref 94–98)
OXYHGB MFR BLDA: 97.7 % (ref 94–98)
OXYHGB MFR BLDA: 97.7 % (ref 94–98)
PCO2 BLDA: 32 MM HG (ref 38–42)
PCO2 BLDA: 37 MM HG (ref 38–42)
PCO2 BLDA: 37 MM HG (ref 38–42)
PCO2 BLDA: 38 MM HG (ref 38–42)
PCO2 BLDA: 38 MM HG (ref 38–42)
PCO2 BLDA: 40 MM HG (ref 38–42)
PCO2 BLDA: 40 MM HG (ref 38–42)
PCO2 BLDA: 41 MM HG (ref 38–42)
PCO2 BLDA: 44 MM HG (ref 38–42)
PEEP CMH2O: 5 CM H2O
PEEP CMH2O: 5 CM H2O
PH BLDA: 7.39 PH (ref 7.38–7.42)
PH BLDA: 7.39 PH (ref 7.38–7.42)
PH BLDA: 7.4 PH (ref 7.38–7.42)
PH BLDA: 7.42 PH (ref 7.38–7.42)
PH BLDA: 7.42 PH (ref 7.38–7.42)
PH BLDA: 7.43 PH (ref 7.38–7.42)
PH BLDA: 7.43 PH (ref 7.38–7.42)
PH BLDA: 7.44 PH (ref 7.38–7.42)
PH BLDA: 7.44 PH (ref 7.38–7.42)
PHOSPHATE SERPL-MCNC: 3.5 MG/DL (ref 2.5–4.9)
PLATELET # BLD AUTO: 129 X10*3/UL (ref 150–450)
PLATELET # BLD AUTO: 169 X10*3/UL (ref 150–450)
PO2 BLDA: 150 MM HG (ref 85–95)
PO2 BLDA: 250 MM HG (ref 85–95)
PO2 BLDA: 271 MM HG (ref 85–95)
PO2 BLDA: 293 MM HG (ref 85–95)
PO2 BLDA: 317 MM HG (ref 85–95)
PO2 BLDA: 320 MM HG (ref 85–95)
PO2 BLDA: 401 MM HG (ref 85–95)
PO2 BLDA: 71 MM HG (ref 85–95)
PO2 BLDA: 81 MM HG (ref 85–95)
POTASSIUM BLDA-SCNC: 3.3 MMOL/L (ref 3.5–5.3)
POTASSIUM BLDA-SCNC: 4.2 MMOL/L (ref 3.5–5.3)
POTASSIUM BLDA-SCNC: 4.4 MMOL/L (ref 3.5–5.3)
POTASSIUM BLDA-SCNC: 4.5 MMOL/L (ref 3.5–5.3)
POTASSIUM BLDA-SCNC: 4.8 MMOL/L (ref 3.5–5.3)
POTASSIUM BLDA-SCNC: 5.5 MMOL/L (ref 3.5–5.3)
POTASSIUM BLDA-SCNC: 6 MMOL/L (ref 3.5–5.3)
POTASSIUM BLDA-SCNC: 6.1 MMOL/L (ref 3.5–5.3)
POTASSIUM SERPL-SCNC: 3.5 MMOL/L (ref 3.5–5.3)
PRODUCT BLOOD TYPE: 6200
PRODUCT CODE: NORMAL
PROTHROMBIN TIME: 16.3 SECONDS (ref 9.8–12.8)
PROTHROMBIN TIME: 20.1 SECONDS (ref 9.8–12.8)
RBC # BLD AUTO: 3.41 X10*6/UL (ref 4–5.2)
SAO2 % BLDA: 100 % (ref 94–100)
SAO2 % BLDA: 96 % (ref 94–100)
SAO2 % BLDA: 97 % (ref 94–100)
SAO2 % BLDA: 98 % (ref 94–100)
SAO2 % BLDA: 99 % (ref 94–100)
SODIUM BLDA-SCNC: 135 MMOL/L (ref 136–145)
SODIUM BLDA-SCNC: 135 MMOL/L (ref 136–145)
SODIUM BLDA-SCNC: 136 MMOL/L (ref 136–145)
SODIUM BLDA-SCNC: 137 MMOL/L (ref 136–145)
SODIUM BLDA-SCNC: 137 MMOL/L (ref 136–145)
SODIUM BLDA-SCNC: 138 MMOL/L (ref 136–145)
SODIUM BLDA-SCNC: 140 MMOL/L (ref 136–145)
SODIUM BLDA-SCNC: 142 MMOL/L (ref 136–145)
SODIUM SERPL-SCNC: 144 MMOL/L (ref 136–145)
SPECIMEN DRAWN FROM PATIENT: ABNORMAL
SPECIMEN DRAWN FROM PATIENT: ABNORMAL
SPONTANEOUS TIDAL VOLUME: 425 ML
TIDAL VOLUME: 400 ML
TIDAL VOLUME: 400 ML
TOTAL MINUTE VOLUME: 7.3 LITER
UNIT ABO: NORMAL
UNIT NUMBER: NORMAL
UNIT RH: NORMAL
UNIT VOLUME: 350
VENTILATOR MODE: ABNORMAL
VENTILATOR MODE: ABNORMAL
VENTILATOR RATE: 16 BPM
VENTILATOR RATE: 18 BPM
WBC # BLD AUTO: 14.5 X10*3/UL (ref 4.4–11.3)
XM INTEP: NORMAL

## 2025-01-24 PROCEDURE — 2500000002 HC RX 250 W HCPCS SELF ADMINISTERED DRUGS (ALT 637 FOR MEDICARE OP, ALT 636 FOR OP/ED): Performed by: NURSE PRACTITIONER

## 2025-01-24 PROCEDURE — 84132 ASSAY OF SERUM POTASSIUM: CPT | Performed by: NURSE PRACTITIONER

## 2025-01-24 PROCEDURE — 2500000002 HC RX 250 W HCPCS SELF ADMINISTERED DRUGS (ALT 637 FOR MEDICARE OP, ALT 636 FOR OP/ED): Performed by: ANESTHESIOLOGIST ASSISTANT

## 2025-01-24 PROCEDURE — 2500000005 HC RX 250 GENERAL PHARMACY W/O HCPCS: Performed by: NURSE PRACTITIONER

## 2025-01-24 PROCEDURE — 84132 ASSAY OF SERUM POTASSIUM: CPT

## 2025-01-24 PROCEDURE — 82947 ASSAY GLUCOSE BLOOD QUANT: CPT

## 2025-01-24 PROCEDURE — 71045 X-RAY EXAM CHEST 1 VIEW: CPT

## 2025-01-24 PROCEDURE — 85049 AUTOMATED PLATELET COUNT: CPT | Performed by: THORACIC SURGERY (CARDIOTHORACIC VASCULAR SURGERY)

## 2025-01-24 PROCEDURE — 2500000001 HC RX 250 WO HCPCS SELF ADMINISTERED DRUGS (ALT 637 FOR MEDICARE OP): Performed by: NURSE PRACTITIONER

## 2025-01-24 PROCEDURE — 2780000003 HC OR 278 NO HCPCS: Performed by: THORACIC SURGERY (CARDIOTHORACIC VASCULAR SURGERY)

## 2025-01-24 PROCEDURE — 2500000005 HC RX 250 GENERAL PHARMACY W/O HCPCS: Performed by: THORACIC SURGERY (CARDIOTHORACIC VASCULAR SURGERY)

## 2025-01-24 PROCEDURE — 85610 PROTHROMBIN TIME: CPT | Performed by: THORACIC SURGERY (CARDIOTHORACIC VASCULAR SURGERY)

## 2025-01-24 PROCEDURE — C1751 CATH, INF, PER/CENT/MIDLINE: HCPCS | Performed by: THORACIC SURGERY (CARDIOTHORACIC VASCULAR SURGERY)

## 2025-01-24 PROCEDURE — 3600000011 HC PERFUSION TIME - INITIAL BASE CHARGE: Performed by: THORACIC SURGERY (CARDIOTHORACIC VASCULAR SURGERY)

## 2025-01-24 PROCEDURE — 33509 NDSC HRV UXTR ART 1 SGM CAB: CPT | Performed by: THORACIC SURGERY (CARDIOTHORACIC VASCULAR SURGERY)

## 2025-01-24 PROCEDURE — 2500000005 HC RX 250 GENERAL PHARMACY W/O HCPCS: Performed by: ANESTHESIOLOGIST ASSISTANT

## 2025-01-24 PROCEDURE — 36430 TRANSFUSION BLD/BLD COMPNT: CPT | Performed by: ANESTHESIOLOGIST ASSISTANT

## 2025-01-24 PROCEDURE — 0211093 BYPASS CORONARY ARTERY, TWO ARTERIES FROM CORONARY ARTERY WITH AUTOLOGOUS VENOUS TISSUE, OPEN APPROACH: ICD-10-PCS | Performed by: THORACIC SURGERY (CARDIOTHORACIC VASCULAR SURGERY)

## 2025-01-24 PROCEDURE — 85610 PROTHROMBIN TIME: CPT | Performed by: NURSE PRACTITIONER

## 2025-01-24 PROCEDURE — 83735 ASSAY OF MAGNESIUM: CPT | Performed by: NURSE PRACTITIONER

## 2025-01-24 PROCEDURE — 33508 ENDOSCOPIC VEIN HARVEST: CPT | Performed by: THORACIC SURGERY (CARDIOTHORACIC VASCULAR SURGERY)

## 2025-01-24 PROCEDURE — 06BP3ZZ EXCISION OF RIGHT SAPHENOUS VEIN, PERCUTANEOUS APPROACH: ICD-10-PCS | Performed by: THORACIC SURGERY (CARDIOTHORACIC VASCULAR SURGERY)

## 2025-01-24 PROCEDURE — 2500000004 HC RX 250 GENERAL PHARMACY W/ HCPCS (ALT 636 FOR OP/ED): Performed by: ANESTHESIOLOGIST ASSISTANT

## 2025-01-24 PROCEDURE — 82805 BLOOD GASES W/O2 SATURATION: CPT

## 2025-01-24 PROCEDURE — A4649 SURGICAL SUPPLIES: HCPCS | Performed by: THORACIC SURGERY (CARDIOTHORACIC VASCULAR SURGERY)

## 2025-01-24 PROCEDURE — 2500000004 HC RX 250 GENERAL PHARMACY W/ HCPCS (ALT 636 FOR OP/ED): Performed by: THORACIC SURGERY (CARDIOTHORACIC VASCULAR SURGERY)

## 2025-01-24 PROCEDURE — C1713 ANCHOR/SCREW BN/BN,TIS/BN: HCPCS | Performed by: THORACIC SURGERY (CARDIOTHORACIC VASCULAR SURGERY)

## 2025-01-24 PROCEDURE — 3600000017 HC OR TIME - EACH INCREMENTAL 1 MINUTE - PROCEDURE LEVEL SIX: Performed by: THORACIC SURGERY (CARDIOTHORACIC VASCULAR SURGERY)

## 2025-01-24 PROCEDURE — P9073 PLATELETS PHERESIS PATH REDU: HCPCS

## 2025-01-24 PROCEDURE — 3700000002 HC GENERAL ANESTHESIA TIME - EACH INCREMENTAL 1 MINUTE: Performed by: THORACIC SURGERY (CARDIOTHORACIC VASCULAR SURGERY)

## 2025-01-24 PROCEDURE — A4312 CATH W/O BAG 2-WAY SILICONE: HCPCS | Performed by: THORACIC SURGERY (CARDIOTHORACIC VASCULAR SURGERY)

## 2025-01-24 PROCEDURE — 03BC3ZZ EXCISION OF LEFT RADIAL ARTERY, PERCUTANEOUS APPROACH: ICD-10-PCS | Performed by: THORACIC SURGERY (CARDIOTHORACIC VASCULAR SURGERY)

## 2025-01-24 PROCEDURE — 2500000004 HC RX 250 GENERAL PHARMACY W/ HCPCS (ALT 636 FOR OP/ED): Performed by: NURSE PRACTITIONER

## 2025-01-24 PROCEDURE — 33534 CABG ARTERIAL TWO: CPT | Performed by: THORACIC SURGERY (CARDIOTHORACIC VASCULAR SURGERY)

## 2025-01-24 PROCEDURE — 71045 X-RAY EXAM CHEST 1 VIEW: CPT | Performed by: RADIOLOGY

## 2025-01-24 PROCEDURE — 33518 CABG ARTERY-VEIN TWO: CPT | Performed by: THORACIC SURGERY (CARDIOTHORACIC VASCULAR SURGERY)

## 2025-01-24 PROCEDURE — 3600000012 HC PERFUSION TIME - EACH INCREMENTAL 1 MINUTE: Performed by: THORACIC SURGERY (CARDIOTHORACIC VASCULAR SURGERY)

## 2025-01-24 PROCEDURE — 85730 THROMBOPLASTIN TIME PARTIAL: CPT | Performed by: THORACIC SURGERY (CARDIOTHORACIC VASCULAR SURGERY)

## 2025-01-24 PROCEDURE — 37799 UNLISTED PX VASCULAR SURGERY: CPT | Performed by: NURSE PRACTITIONER

## 2025-01-24 PROCEDURE — 5A1221Z PERFORMANCE OF CARDIAC OUTPUT, CONTINUOUS: ICD-10-PCS | Performed by: THORACIC SURGERY (CARDIOTHORACIC VASCULAR SURGERY)

## 2025-01-24 PROCEDURE — 85384 FIBRINOGEN ACTIVITY: CPT | Performed by: THORACIC SURGERY (CARDIOTHORACIC VASCULAR SURGERY)

## 2025-01-24 PROCEDURE — 2720000007 HC OR 272 NO HCPCS: Performed by: THORACIC SURGERY (CARDIOTHORACIC VASCULAR SURGERY)

## 2025-01-24 PROCEDURE — P9045 ALBUMIN (HUMAN), 5%, 250 ML: HCPCS | Mod: JZ | Performed by: NURSE PRACTITIONER

## 2025-01-24 PROCEDURE — 37799 UNLISTED PX VASCULAR SURGERY: CPT | Performed by: THORACIC SURGERY (CARDIOTHORACIC VASCULAR SURGERY)

## 2025-01-24 PROCEDURE — 82330 ASSAY OF CALCIUM: CPT | Performed by: NURSE PRACTITIONER

## 2025-01-24 PROCEDURE — 93005 ELECTROCARDIOGRAM TRACING: CPT

## 2025-01-24 PROCEDURE — 99233 SBSQ HOSP IP/OBS HIGH 50: CPT | Performed by: NURSE PRACTITIONER

## 2025-01-24 PROCEDURE — 3700000001 HC GENERAL ANESTHESIA TIME - INITIAL BASE CHARGE: Performed by: THORACIC SURGERY (CARDIOTHORACIC VASCULAR SURGERY)

## 2025-01-24 PROCEDURE — 3600000018 HC OR TIME - INITIAL BASE CHARGE - PROCEDURE LEVEL SIX: Performed by: THORACIC SURGERY (CARDIOTHORACIC VASCULAR SURGERY)

## 2025-01-24 PROCEDURE — 2020000001 HC ICU ROOM DAILY

## 2025-01-24 PROCEDURE — 02100A3 BYPASS CORONARY ARTERY, ONE ARTERY FROM CORONARY ARTERY WITH AUTOLOGOUS ARTERIAL TISSUE, OPEN APPROACH: ICD-10-PCS | Performed by: THORACIC SURGERY (CARDIOTHORACIC VASCULAR SURGERY)

## 2025-01-24 PROCEDURE — P9045 ALBUMIN (HUMAN), 5%, 250 ML: HCPCS | Mod: JZ | Performed by: ANESTHESIOLOGIST ASSISTANT

## 2025-01-24 PROCEDURE — 02100Z9 BYPASS CORONARY ARTERY, ONE ARTERY FROM LEFT INTERNAL MAMMARY, OPEN APPROACH: ICD-10-PCS | Performed by: THORACIC SURGERY (CARDIOTHORACIC VASCULAR SURGERY)

## 2025-01-24 PROCEDURE — B246ZZ4 ULTRASONOGRAPHY OF RIGHT AND LEFT HEART, TRANSESOPHAGEAL: ICD-10-PCS | Performed by: THORACIC SURGERY (CARDIOTHORACIC VASCULAR SURGERY)

## 2025-01-24 PROCEDURE — 83735 ASSAY OF MAGNESIUM: CPT | Performed by: THORACIC SURGERY (CARDIOTHORACIC VASCULAR SURGERY)

## 2025-01-24 PROCEDURE — 85027 COMPLETE CBC AUTOMATED: CPT | Performed by: NURSE PRACTITIONER

## 2025-01-24 PROCEDURE — 85384 FIBRINOGEN ACTIVITY: CPT | Performed by: NURSE PRACTITIONER

## 2025-01-24 PROCEDURE — 99291 CRITICAL CARE FIRST HOUR: CPT

## 2025-01-24 PROCEDURE — 87081 CULTURE SCREEN ONLY: CPT | Mod: PARLAB | Performed by: NURSE PRACTITIONER

## 2025-01-24 PROCEDURE — 85347 COAGULATION TIME ACTIVATED: CPT

## 2025-01-24 PROCEDURE — 94002 VENT MGMT INPAT INIT DAY: CPT

## 2025-01-24 DEVICE — X-PLATE, LOW PROFILE: Type: IMPLANTABLE DEVICE | Site: STERNUM | Status: FUNCTIONAL

## 2025-01-24 DEVICE — SCREW, SD LOCKING, 2.3 X 12MM: Type: IMPLANTABLE DEVICE | Site: STERNUM | Status: FUNCTIONAL

## 2025-01-24 DEVICE — PLATE, LP BOX: Type: IMPLANTABLE DEVICE | Site: STERNUM | Status: FUNCTIONAL

## 2025-01-24 RX ORDER — POTASSIUM CHLORIDE 1.5 G/1.58G
20 POWDER, FOR SOLUTION ORAL EVERY 6 HOURS PRN
Status: DISCONTINUED | OUTPATIENT
Start: 2025-01-24 | End: 2025-01-29 | Stop reason: HOSPADM

## 2025-01-24 RX ORDER — HEPARIN SODIUM 10000 [USP'U]/ML
INJECTION, SOLUTION INTRAVENOUS; SUBCUTANEOUS AS NEEDED
Status: DISCONTINUED | OUTPATIENT
Start: 2025-01-24 | End: 2025-01-24

## 2025-01-24 RX ORDER — POTASSIUM CHLORIDE 29.8 MG/ML
40 INJECTION INTRAVENOUS EVERY 2 HOUR PRN
Status: DISCONTINUED | OUTPATIENT
Start: 2025-01-24 | End: 2025-01-26

## 2025-01-24 RX ORDER — AMOXICILLIN 250 MG
2 CAPSULE ORAL 2 TIMES DAILY
Status: DISCONTINUED | OUTPATIENT
Start: 2025-01-24 | End: 2025-01-29 | Stop reason: HOSPADM

## 2025-01-24 RX ORDER — INDOMETHACIN 25 MG/1
CAPSULE ORAL AS NEEDED
Status: DISCONTINUED | OUTPATIENT
Start: 2025-01-24 | End: 2025-01-24

## 2025-01-24 RX ORDER — POTASSIUM CHLORIDE 20 MEQ/1
20 TABLET, EXTENDED RELEASE ORAL EVERY 6 HOURS PRN
Status: DISCONTINUED | OUTPATIENT
Start: 2025-01-24 | End: 2025-01-29 | Stop reason: HOSPADM

## 2025-01-24 RX ORDER — ENOXAPARIN SODIUM 100 MG/ML
40 INJECTION SUBCUTANEOUS DAILY
Status: DISCONTINUED | OUTPATIENT
Start: 2025-01-25 | End: 2025-01-29 | Stop reason: HOSPADM

## 2025-01-24 RX ORDER — NOREPINEPHRINE BITARTRATE 0.03 MG/ML
INJECTION, SOLUTION INTRAVENOUS CONTINUOUS PRN
Status: DISCONTINUED | OUTPATIENT
Start: 2025-01-24 | End: 2025-01-24

## 2025-01-24 RX ORDER — LANOLIN ALCOHOL/MO/W.PET/CERES
400 CREAM (GRAM) TOPICAL DAILY
Status: DISCONTINUED | OUTPATIENT
Start: 2025-01-24 | End: 2025-01-29 | Stop reason: HOSPADM

## 2025-01-24 RX ORDER — LIDOCAINE 560 MG/1
1 PATCH PERCUTANEOUS; TOPICAL; TRANSDERMAL EVERY 24 HOURS
Status: DISCONTINUED | OUTPATIENT
Start: 2025-01-24 | End: 2025-01-29 | Stop reason: HOSPADM

## 2025-01-24 RX ORDER — NOREPINEPHRINE BITARTRATE 0.03 MG/ML
0-3 INJECTION, SOLUTION INTRAVENOUS CONTINUOUS
Status: DISCONTINUED | OUTPATIENT
Start: 2025-01-24 | End: 2025-01-26

## 2025-01-24 RX ORDER — ALBUMIN HUMAN 50 G/1000ML
12.5 SOLUTION INTRAVENOUS AS NEEDED
Status: COMPLETED | OUTPATIENT
Start: 2025-01-24 | End: 2025-01-24

## 2025-01-24 RX ORDER — ATROPINE SULFATE 0.1 MG/ML
INJECTION INTRAVENOUS AS NEEDED
Status: DISCONTINUED | OUTPATIENT
Start: 2025-01-24 | End: 2025-01-24

## 2025-01-24 RX ORDER — POTASSIUM CHLORIDE 20 MEQ/1
40 TABLET, EXTENDED RELEASE ORAL EVERY 6 HOURS PRN
Status: DISCONTINUED | OUTPATIENT
Start: 2025-01-24 | End: 2025-01-29 | Stop reason: HOSPADM

## 2025-01-24 RX ORDER — PANTOPRAZOLE SODIUM 40 MG/10ML
40 INJECTION, POWDER, LYOPHILIZED, FOR SOLUTION INTRAVENOUS
Status: DISCONTINUED | OUTPATIENT
Start: 2025-01-25 | End: 2025-01-26

## 2025-01-24 RX ORDER — SODIUM CHLORIDE 0.9 G/100ML
INJECTION, SOLUTION IRRIGATION AS NEEDED
Status: DISCONTINUED | OUTPATIENT
Start: 2025-01-24 | End: 2025-01-24 | Stop reason: HOSPADM

## 2025-01-24 RX ORDER — ALBUMIN HUMAN 50 G/1000ML
SOLUTION INTRAVENOUS AS NEEDED
Status: DISCONTINUED | OUTPATIENT
Start: 2025-01-24 | End: 2025-01-24

## 2025-01-24 RX ORDER — CEFAZOLIN SODIUM 2 G/100ML
2 INJECTION, SOLUTION INTRAVENOUS EVERY 8 HOURS
Status: DISPENSED | OUTPATIENT
Start: 2025-01-24 | End: 2025-01-26

## 2025-01-24 RX ORDER — MUPIROCIN 20 MG/G
1 OINTMENT TOPICAL 2 TIMES DAILY
Status: DISPENSED | OUTPATIENT
Start: 2025-01-24 | End: 2025-01-29

## 2025-01-24 RX ORDER — NITROGLYCERIN 20 MG/100ML
INJECTION INTRAVENOUS AS NEEDED
Status: DISCONTINUED | OUTPATIENT
Start: 2025-01-24 | End: 2025-01-24

## 2025-01-24 RX ORDER — MAGNESIUM SULFATE HEPTAHYDRATE 40 MG/ML
2 INJECTION, SOLUTION INTRAVENOUS EVERY 6 HOURS PRN
Status: DISCONTINUED | OUTPATIENT
Start: 2025-01-24 | End: 2025-01-29 | Stop reason: HOSPADM

## 2025-01-24 RX ORDER — LORAZEPAM 2 MG/ML
INJECTION INTRAMUSCULAR AS NEEDED
Status: DISCONTINUED | OUTPATIENT
Start: 2025-01-24 | End: 2025-01-24

## 2025-01-24 RX ORDER — NORETHINDRONE AND ETHINYL ESTRADIOL 0.5-0.035
KIT ORAL CONTINUOUS PRN
Status: DISCONTINUED | OUTPATIENT
Start: 2025-01-24 | End: 2025-01-24

## 2025-01-24 RX ORDER — POTASSIUM CHLORIDE 1.5 G/1.58G
40 POWDER, FOR SOLUTION ORAL EVERY 6 HOURS PRN
Status: DISCONTINUED | OUTPATIENT
Start: 2025-01-24 | End: 2025-01-29 | Stop reason: HOSPADM

## 2025-01-24 RX ORDER — MANNITOL 20 G/100ML
INJECTION, SOLUTION INTRAVENOUS AS NEEDED
Status: DISCONTINUED | OUTPATIENT
Start: 2025-01-24 | End: 2025-01-24

## 2025-01-24 RX ORDER — OXYCODONE HYDROCHLORIDE 5 MG/1
5 TABLET ORAL EVERY 4 HOURS PRN
Status: DISCONTINUED | OUTPATIENT
Start: 2025-01-24 | End: 2025-01-29 | Stop reason: HOSPADM

## 2025-01-24 RX ORDER — DEXMEDETOMIDINE HYDROCHLORIDE 4 UG/ML
0-1.5 INJECTION, SOLUTION INTRAVENOUS CONTINUOUS
Status: DISCONTINUED | OUTPATIENT
Start: 2025-01-24 | End: 2025-01-25

## 2025-01-24 RX ORDER — METHOCARBAMOL 500 MG/1
500 TABLET, FILM COATED ORAL EVERY 8 HOURS SCHEDULED
Status: DISCONTINUED | OUTPATIENT
Start: 2025-01-24 | End: 2025-01-29 | Stop reason: HOSPADM

## 2025-01-24 RX ORDER — LORAZEPAM 2 MG/ML
INJECTION INTRAMUSCULAR
Status: COMPLETED
Start: 2025-01-24 | End: 2025-01-24

## 2025-01-24 RX ORDER — ROCURONIUM BROMIDE 10 MG/ML
INJECTION, SOLUTION INTRAVENOUS AS NEEDED
Status: DISCONTINUED | OUTPATIENT
Start: 2025-01-24 | End: 2025-01-24

## 2025-01-24 RX ORDER — CEFAZOLIN 1 G/1
INJECTION, POWDER, FOR SOLUTION INTRAVENOUS AS NEEDED
Status: DISCONTINUED | OUTPATIENT
Start: 2025-01-24 | End: 2025-01-24

## 2025-01-24 RX ORDER — EPINEPHRINE HCL IN DEXTROSE 5% 4MG/250ML
0-2 PLASTIC BAG, INJECTION (ML) INTRAVENOUS CONTINUOUS
Status: DISCONTINUED | OUTPATIENT
Start: 2025-01-24 | End: 2025-01-26

## 2025-01-24 RX ORDER — OXYCODONE HYDROCHLORIDE 5 MG/1
10 TABLET ORAL EVERY 4 HOURS PRN
Status: DISCONTINUED | OUTPATIENT
Start: 2025-01-24 | End: 2025-01-29 | Stop reason: HOSPADM

## 2025-01-24 RX ORDER — ONDANSETRON HYDROCHLORIDE 2 MG/ML
4 INJECTION, SOLUTION INTRAVENOUS EVERY 8 HOURS PRN
Status: DISCONTINUED | OUTPATIENT
Start: 2025-01-24 | End: 2025-01-29 | Stop reason: HOSPADM

## 2025-01-24 RX ORDER — PAPAVERINE HYDROCHLORIDE 30 MG/ML
INJECTION INTRAMUSCULAR; INTRAVENOUS AS NEEDED
Status: DISCONTINUED | OUTPATIENT
Start: 2025-01-24 | End: 2025-01-24 | Stop reason: HOSPADM

## 2025-01-24 RX ORDER — ETOMIDATE 2 MG/ML
INJECTION INTRAVENOUS AS NEEDED
Status: DISCONTINUED | OUTPATIENT
Start: 2025-01-24 | End: 2025-01-24

## 2025-01-24 RX ORDER — PHENYLEPHRINE HCL IN 0.9% NACL 1 MG/10 ML
SYRINGE (ML) INTRAVENOUS AS NEEDED
Status: DISCONTINUED | OUTPATIENT
Start: 2025-01-24 | End: 2025-01-24

## 2025-01-24 RX ORDER — FENTANYL CITRATE 50 UG/ML
INJECTION, SOLUTION INTRAMUSCULAR; INTRAVENOUS AS NEEDED
Status: DISCONTINUED | OUTPATIENT
Start: 2025-01-24 | End: 2025-01-24

## 2025-01-24 RX ORDER — POTASSIUM CHLORIDE 14.9 MG/ML
20 INJECTION INTRAVENOUS
Status: DISCONTINUED | OUTPATIENT
Start: 2025-01-24 | End: 2025-01-26

## 2025-01-24 RX ORDER — ONDANSETRON 4 MG/1
4 TABLET, ORALLY DISINTEGRATING ORAL EVERY 8 HOURS PRN
Status: DISCONTINUED | OUTPATIENT
Start: 2025-01-24 | End: 2025-01-29 | Stop reason: HOSPADM

## 2025-01-24 RX ORDER — CALCIUM CHLORIDE INJECTION 100 MG/ML
INJECTION, SOLUTION INTRAVENOUS AS NEEDED
Status: DISCONTINUED | OUTPATIENT
Start: 2025-01-24 | End: 2025-01-24

## 2025-01-24 RX ORDER — ALBUMIN HUMAN 50 G/1000ML
12.5 SOLUTION INTRAVENOUS
Status: DISPENSED | OUTPATIENT
Start: 2025-01-24 | End: 2025-01-24

## 2025-01-24 RX ORDER — ACETAMINOPHEN 325 MG/1
650 TABLET ORAL EVERY 6 HOURS
Status: DISCONTINUED | OUTPATIENT
Start: 2025-01-24 | End: 2025-01-29 | Stop reason: HOSPADM

## 2025-01-24 RX ORDER — DEXMEDETOMIDINE HYDROCHLORIDE 4 UG/ML
INJECTION, SOLUTION INTRAVENOUS CONTINUOUS PRN
Status: DISCONTINUED | OUTPATIENT
Start: 2025-01-24 | End: 2025-01-24

## 2025-01-24 RX ORDER — PHENYLEPHRINE 10 MG/250 ML(40 MCG/ML)IN 0.9 % SOD.CHLORIDE INTRAVENOUS
0-9 CONTINUOUS
Status: DISCONTINUED | OUTPATIENT
Start: 2025-01-24 | End: 2025-01-24

## 2025-01-24 RX ORDER — METOCLOPRAMIDE 10 MG/1
10 TABLET ORAL EVERY 6 HOURS PRN
Status: DISCONTINUED | OUTPATIENT
Start: 2025-01-24 | End: 2025-01-29 | Stop reason: HOSPADM

## 2025-01-24 RX ORDER — FENTANYL CITRATE 50 UG/ML
25 INJECTION, SOLUTION INTRAMUSCULAR; INTRAVENOUS
Status: DISCONTINUED | OUTPATIENT
Start: 2025-01-24 | End: 2025-01-29 | Stop reason: HOSPADM

## 2025-01-24 RX ORDER — MAGNESIUM SULFATE HEPTAHYDRATE 40 MG/ML
4 INJECTION, SOLUTION INTRAVENOUS EVERY 6 HOURS PRN
Status: DISCONTINUED | OUTPATIENT
Start: 2025-01-24 | End: 2025-01-29 | Stop reason: HOSPADM

## 2025-01-24 RX ORDER — MIDAZOLAM HYDROCHLORIDE 1 MG/ML
INJECTION, SOLUTION INTRAMUSCULAR; INTRAVENOUS
Status: COMPLETED
Start: 2025-01-24 | End: 2025-01-24

## 2025-01-24 RX ORDER — PANTOPRAZOLE SODIUM 40 MG/1
40 TABLET, DELAYED RELEASE ORAL
Status: DISCONTINUED | OUTPATIENT
Start: 2025-01-25 | End: 2025-01-26

## 2025-01-24 RX ORDER — PROTAMINE SULFATE 10 MG/ML
INJECTION, SOLUTION INTRAVENOUS AS NEEDED
Status: DISCONTINUED | OUTPATIENT
Start: 2025-01-24 | End: 2025-01-24

## 2025-01-24 RX ORDER — CEFAZOLIN SODIUM 2 G/100ML
2 INJECTION, SOLUTION INTRAVENOUS ONCE
Status: DISCONTINUED | OUTPATIENT
Start: 2025-01-24 | End: 2025-01-24

## 2025-01-24 RX ORDER — INSULIN LISPRO 100 [IU]/ML
0-15 INJECTION, SOLUTION INTRAVENOUS; SUBCUTANEOUS EVERY 4 HOURS
Status: DISCONTINUED | OUTPATIENT
Start: 2025-01-24 | End: 2025-01-26

## 2025-01-24 RX ORDER — MIDAZOLAM HYDROCHLORIDE 1 MG/ML
INJECTION, SOLUTION INTRAMUSCULAR; INTRAVENOUS AS NEEDED
Status: DISCONTINUED | OUTPATIENT
Start: 2025-01-24 | End: 2025-01-24

## 2025-01-24 RX ORDER — GABAPENTIN 100 MG/1
100 CAPSULE ORAL 3 TIMES DAILY
Status: DISCONTINUED | OUTPATIENT
Start: 2025-01-24 | End: 2025-01-29 | Stop reason: HOSPADM

## 2025-01-24 RX ORDER — VANCOMYCIN HYDROCHLORIDE 1 G/20ML
INJECTION, POWDER, LYOPHILIZED, FOR SOLUTION INTRAVENOUS AS NEEDED
Status: DISCONTINUED | OUTPATIENT
Start: 2025-01-24 | End: 2025-01-24 | Stop reason: HOSPADM

## 2025-01-24 RX ORDER — NALOXONE HYDROCHLORIDE 1 MG/ML
0.2 INJECTION INTRAMUSCULAR; INTRAVENOUS; SUBCUTANEOUS EVERY 5 MIN PRN
Status: DISCONTINUED | OUTPATIENT
Start: 2025-01-24 | End: 2025-01-29 | Stop reason: HOSPADM

## 2025-01-24 RX ORDER — METOCLOPRAMIDE HYDROCHLORIDE 5 MG/ML
10 INJECTION INTRAMUSCULAR; INTRAVENOUS EVERY 6 HOURS PRN
Status: DISCONTINUED | OUTPATIENT
Start: 2025-01-24 | End: 2025-01-29 | Stop reason: HOSPADM

## 2025-01-24 RX ORDER — AMINOCAPROIC ACID 250 MG/ML
INJECTION, SOLUTION INTRAVENOUS AS NEEDED
Status: DISCONTINUED | OUTPATIENT
Start: 2025-01-24 | End: 2025-01-24

## 2025-01-24 RX ORDER — LIDOCAINE HYDROCHLORIDE 20 MG/ML
INJECTION, SOLUTION INFILTRATION; PERINEURAL AS NEEDED
Status: DISCONTINUED | OUTPATIENT
Start: 2025-01-24 | End: 2025-01-24

## 2025-01-24 RX ADMIN — INSULIN HUMAN 3 UNITS: 100 INJECTION, SOLUTION PARENTERAL at 11:03

## 2025-01-24 RX ADMIN — ALBUMIN HUMAN 12.5 G: 0.05 INJECTION, SOLUTION INTRAVENOUS at 17:26

## 2025-01-24 RX ADMIN — SUGAMMADEX 200 MG: 100 INJECTION, SOLUTION INTRAVENOUS at 15:07

## 2025-01-24 RX ADMIN — SODIUM BICARBONATE 50 MEQ: 84 INJECTION, SOLUTION INTRAVENOUS at 10:57

## 2025-01-24 RX ADMIN — GABAPENTIN 100 MG: 100 CAPSULE ORAL at 14:37

## 2025-01-24 RX ADMIN — NITROGLYCERIN 100 MCG: 20 INJECTION INTRAVENOUS at 08:30

## 2025-01-24 RX ADMIN — CALCIUM CHLORIDE 500 MG: 100 INJECTION, SOLUTION INTRAVENOUS at 15:38

## 2025-01-24 RX ADMIN — CALCIUM CHLORIDE 1 G: 100 INJECTION, SOLUTION INTRAVENOUS at 11:24

## 2025-01-24 RX ADMIN — FENTANYL CITRATE 100 MCG: 50 INJECTION, SOLUTION INTRAMUSCULAR; INTRAVENOUS at 08:31

## 2025-01-24 RX ADMIN — Medication 100 MCG: at 11:50

## 2025-01-24 RX ADMIN — Medication 40 PERCENT: at 13:35

## 2025-01-24 RX ADMIN — NITROGLYCERIN 100 MCG: 20 INJECTION INTRAVENOUS at 08:47

## 2025-01-24 RX ADMIN — AMINOCAPROIC ACID 12.5 G: 250 INJECTION, SOLUTION INTRAVENOUS at 08:33

## 2025-01-24 RX ADMIN — MIDAZOLAM 1 MG: 1 INJECTION INTRAMUSCULAR; INTRAVENOUS at 07:27

## 2025-01-24 RX ADMIN — ATROPINE SULFATE 1 MG: 0.1 INJECTION INTRAVENOUS at 08:53

## 2025-01-24 RX ADMIN — OXYCODONE HYDROCHLORIDE 5 MG: 5 TABLET ORAL at 19:51

## 2025-01-24 RX ADMIN — SENNOSIDES AND DOCUSATE SODIUM 2 TABLET: 50; 8.6 TABLET ORAL at 19:24

## 2025-01-24 RX ADMIN — CEFAZOLIN 2 G: 1 INJECTION, POWDER, FOR SOLUTION INTRAMUSCULAR; INTRAVENOUS at 07:44

## 2025-01-24 RX ADMIN — OXYCODONE HYDROCHLORIDE 5 MG: 5 TABLET ORAL at 19:23

## 2025-01-24 RX ADMIN — LIDOCAINE 4% 1 PATCH: 40 PATCH TOPICAL at 14:37

## 2025-01-24 RX ADMIN — FENTANYL CITRATE 100 MCG: 50 INJECTION, SOLUTION INTRAMUSCULAR; INTRAVENOUS at 08:47

## 2025-01-24 RX ADMIN — FENTANYL CITRATE 100 MCG: 50 INJECTION, SOLUTION INTRAMUSCULAR; INTRAVENOUS at 11:41

## 2025-01-24 RX ADMIN — ROCURONIUM BROMIDE 50 MG: 10 INJECTION, SOLUTION INTRAVENOUS at 10:56

## 2025-01-24 RX ADMIN — MAGNESIUM OXIDE TAB 400 MG (241.3 MG ELEMENTAL MG) 400 MG: 400 (241.3 MG) TAB at 14:37

## 2025-01-24 RX ADMIN — GABAPENTIN 100 MG: 100 CAPSULE ORAL at 19:24

## 2025-01-24 RX ADMIN — ACETAMINOPHEN 650 MG: 325 TABLET, FILM COATED ORAL at 19:22

## 2025-01-24 RX ADMIN — INSULIN LISPRO 5 UNITS: 100 INJECTION, SOLUTION INTRAVENOUS; SUBCUTANEOUS at 19:06

## 2025-01-24 RX ADMIN — LORAZEPAM 1 MG: 2 INJECTION, SOLUTION INTRAMUSCULAR; INTRAVENOUS at 07:27

## 2025-01-24 RX ADMIN — Medication 40 PERCENT: at 14:20

## 2025-01-24 RX ADMIN — METHOCARBAMOL 500 MG: 500 TABLET ORAL at 14:37

## 2025-01-24 RX ADMIN — FENTANYL CITRATE 100 MCG: 50 INJECTION, SOLUTION INTRAMUSCULAR; INTRAVENOUS at 08:52

## 2025-01-24 RX ADMIN — FENTANYL CITRATE 100 MCG: 50 INJECTION, SOLUTION INTRAMUSCULAR; INTRAVENOUS at 08:00

## 2025-01-24 RX ADMIN — METOPROLOL TARTRATE 50 MG: 50 TABLET, FILM COATED ORAL at 06:09

## 2025-01-24 RX ADMIN — POTASSIUM CHLORIDE 40 MEQ: 29.8 INJECTION, SOLUTION INTRAVENOUS at 14:38

## 2025-01-24 RX ADMIN — ROCURONIUM BROMIDE 50 MG: 10 INJECTION, SOLUTION INTRAVENOUS at 08:00

## 2025-01-24 RX ADMIN — MIDAZOLAM 1 MG: 1 INJECTION INTRAMUSCULAR; INTRAVENOUS at 07:42

## 2025-01-24 RX ADMIN — METHOCARBAMOL 500 MG: 500 TABLET ORAL at 21:45

## 2025-01-24 RX ADMIN — Medication 0.1 MCG/KG/MIN: at 12:36

## 2025-01-24 RX ADMIN — ALBUMIN (HUMAN) 250 ML: 12.5 INJECTION, SOLUTION INTRAVENOUS at 12:31

## 2025-01-24 RX ADMIN — Medication 100 MCG: at 09:25

## 2025-01-24 RX ADMIN — LIDOCAINE HYDROCHLORIDE 50 MG: 20 INJECTION, SOLUTION INFILTRATION; PERINEURAL at 08:00

## 2025-01-24 RX ADMIN — PROTAMINE SULFATE 350 MG: 10 INJECTION, SOLUTION INTRAVENOUS at 11:37

## 2025-01-24 RX ADMIN — MUPIROCIN 1 APPLICATION: 20 OINTMENT TOPICAL at 20:54

## 2025-01-24 RX ADMIN — ROCURONIUM BROMIDE 50 MG: 10 INJECTION, SOLUTION INTRAVENOUS at 09:13

## 2025-01-24 RX ADMIN — HEPARIN SODIUM 35000 UNITS: 10000 INJECTION INTRAVENOUS; SUBCUTANEOUS at 09:50

## 2025-01-24 RX ADMIN — ACETAMINOPHEN 650 MG: 325 TABLET, FILM COATED ORAL at 14:37

## 2025-01-24 RX ADMIN — AMINOCAPROIC ACID 12.5 G: 250 INJECTION, SOLUTION INTRAVENOUS at 10:01

## 2025-01-24 RX ADMIN — CEFAZOLIN SODIUM 2 G: 2 INJECTION, SOLUTION INTRAVENOUS at 21:45

## 2025-01-24 RX ADMIN — ASPIRIN 81 MG CHEWABLE TABLET 81 MG: 81 TABLET CHEWABLE at 06:09

## 2025-01-24 RX ADMIN — CEFAZOLIN 3 G: 1 INJECTION, POWDER, FOR SOLUTION INTRAMUSCULAR; INTRAVENOUS at 11:44

## 2025-01-24 RX ADMIN — LORAZEPAM 1 MG: 2 INJECTION, SOLUTION INTRAMUSCULAR; INTRAVENOUS at 07:42

## 2025-01-24 RX ADMIN — MANNITOL 50 ML: 20 INJECTION, SOLUTION INTRAVENOUS at 11:02

## 2025-01-24 RX ADMIN — Medication 30 PERCENT: at 19:25

## 2025-01-24 RX ADMIN — DEXMEDETOMIDINE HYDROCHLORIDE 0.2 MCG/KG/HR: 400 INJECTION INTRAVENOUS at 19:40

## 2025-01-24 RX ADMIN — ALBUMIN HUMAN 12.5 G: 0.05 INJECTION, SOLUTION INTRAVENOUS at 23:01

## 2025-01-24 RX ADMIN — ONDANSETRON 4 MG: 2 INJECTION INTRAMUSCULAR; INTRAVENOUS at 19:23

## 2025-01-24 RX ADMIN — DEXMEDETOMIDINE HYDROCHLORIDE 0.4 MCG/KG/HR: 4 INJECTION, SOLUTION INTRAVENOUS at 10:12

## 2025-01-24 RX ADMIN — SODIUM BICARBONATE 25 MEQ: 84 INJECTION, SOLUTION INTRAVENOUS at 10:23

## 2025-01-24 RX ADMIN — ETOMIDATE 16 MG: 20 INJECTION, SOLUTION INTRAVENOUS at 08:00

## 2025-01-24 RX ADMIN — INSULIN HUMAN 5 UNITS: 100 INJECTION, SOLUTION PARENTERAL at 10:45

## 2025-01-24 RX ADMIN — ALBUMIN (HUMAN) 250 ML: 12.5 INJECTION, SOLUTION INTRAVENOUS at 11:32

## 2025-01-24 ASSESSMENT — ENCOUNTER SYMPTOMS
BLOATING: 0
WEAKNESS: 0
DOUBLE VISION: 0
NAIL CHANGES: 0
COUGH: 0
MYALGIAS: 0
FREQUENCY: 0
MUSCLE CRAMPS: 0
ALTERED MENTAL STATUS: 0
NAUSEA: 0
VOMITING: 0
BLURRED VISION: 0
STIFFNESS: 0
ORTHOPNEA: 0
POOR WOUND HEALING: 0
DIZZINESS: 0
PALPITATIONS: 0
FOCAL WEAKNESS: 0
SHORTNESS OF BREATH: 0
IRREGULAR HEARTBEAT: 0
LIGHT-HEADEDNESS: 0
DECREASED APPETITE: 0
DYSPNEA ON EXERTION: 0

## 2025-01-24 ASSESSMENT — PAIN - FUNCTIONAL ASSESSMENT
PAIN_FUNCTIONAL_ASSESSMENT: CPOT (CRITICAL CARE PAIN OBSERVATION TOOL)
PAIN_FUNCTIONAL_ASSESSMENT: CPOT (CRITICAL CARE PAIN OBSERVATION TOOL)

## 2025-01-24 ASSESSMENT — PAIN SCALES - GENERAL
PAINLEVEL_OUTOF10: 0 - NO PAIN
PAINLEVEL_OUTOF10: 0 - NO PAIN

## 2025-01-24 NOTE — OP NOTE
CORONARY ARTERY BYPASS GRAFT x4 Operative Note     Date: 2025  OR Location: [facility]    Name: Jessy Garcia, : 1950, Age: 75 y.o., MRN: [id number], Sex: female    Diagnosis  Pre-op Diagnosis      * NSTEMI (non-ST elevated myocardial infarction) (Multi) [I21.4] Post-op Diagnosis     * NSTEMI (non-ST elevated myocardial infarction) (Multi) [I21.4]     Procedures  1.  Median sternotomy  2.  Off-pump coronary artery bypass x 4  LIMA to LAD  Radial artery to the RCA  SVG to OM  SVG to diagonal 2  3.  Endoscopic harvesting of the left radial artery  4.  Endoscopic harvesting of the right greater saphenous vein  5.  Sternal plating      Surgeons      * Deyvi Gonzalez - Primary    Resident/Fellow/Other Assistants    Anthony Carson, SA Nino Ortiz, GENARO Corrales    Staff:   Circulator: Scarlett  Scrub Person: Maddie  Surgical Assistant: Christo  Surgical Assistant: Roxy  Surgical Assistant: Nino Schmitz Scrub: Maribel  Relief Circulator: Maribel  Relief Circulator: Laura  Relief Scrub: Trixie    Anesthesia Staff: Anesthesiologist: Stoney Becerra MD  C-AA: RENE Guo  Perfusionist: Scarlet Saleh    Procedure Summary  Anesthesia: General  ASA: IV  Estimated Blood Loss: 500mL  Intra-op Medications: * Intraprocedure medication information is unavailable because the case start and end events have not been set *           Anesthesia Record               Intraprocedure I/O Totals          Intake    PLATELETS 250.00 mL    Dexmedetomidine 0.00 mL    The total shown is the total volume documented since Anesthesia Start was filed.    Norepinephrine Drip 0.00 mL    The total shown is the total volume documented since Anesthesia Start was filed.    Nitroglycerin Drip 0.00 mL    The total shown is the total volume documented since Anesthesia Start was filed.    Cell Saver 575 mL    Total Intake 825 mL       Output    Urine 800 mL    Total Output 800 mL       Net    Net Volume 25 mL           Specimen: No specimens collected      Drains and/or Catheters:   Chest Tube 1 Left Pleural 28 Fr (Active)       Chest Tube 2 Mediastinal 28 Fr (Active)       Chest Tube 3 Right Pleural 28 Fr (Active)       Urethral Catheter Temperature probe;Non-latex 14 Fr. (Active)       Tourniquet Times:     Total Tourniquet Time Documented:  Arm - Upper (laterality) - 31 minutes  Total: Arm - Upper (laterality) - 31 minutes      Implants:  Implants       Type Name Action Serial No.      Screw SCREW, SD LOCKING, 2.3 X 12MM - SN/A - [id number] Implanted N/A     Screw X-PLATE, LOW PROFILE - SN/A - [id number] Implanted N/A     Screw PLATE, LP BOX - SN/A - [id number] Implanted N/A              Findings:   Normal size heart  Hypertroph wide left ventricle    Indications: Jessy Garcia is an 75 y.o. female who is having surgery for NSTEMI (non-ST elevated myocardial infarction) (Multi) [I21.4].  She was found to have severe triple-vessel coronary artery disease which was deemed better to surgical revascularization.  My colleague Jerome Hnut saw her and recommended CABG.  I was asked to operate on her.    I met with her where we went over the risks and benefits including not surviving the operation, stroke, myocardial damage, kidney dysfunction, liver dysfunction, bleeding, intrathoracic structural damage, to name a few.  I also stated benefit of increasing her life span and keeping her quality of life.  After answering all the questions, the patient consented for surgery.        Procedure Details:   Patient was seen in the heart unit.  She was transported to the operating room, where we performed a safety checklist.  She was put to sleep under general anesthetic.  A central venous and arterial peripheral lines were inserted by the anesthesia team.  Transesophageal echocardiogram was performed and showed normal biventricular function with no significant valvular abnormalities.    Patient was prepped and draped in usual fashion.   A median sternotomy was performed.  The left internal mammary artery was harvested in a pedicle.  Simultaneously, the left radial artery and the right greater saphenous vein were harvested endoscopically by the surgical assistant.  All the conduits were harvested, full dose heparin was given (400 units/kg).    Pulmonary bypass was installed with a #20 EO PA cannula in the distal ascending aorta, and a double stage venous cannula into the right atrial through the right atrial appendage.  Antegrade and retrograde cardioplegia catheters were also inserted.    After confirming ACT above 480 seconds, we commenced cardiopulmonary bypass.  The aorta was crossclamped, and cardioplegia was given.  Initially, intervention was performed with 1 L antegradely and 500 mL retrogradely.  Throughout the procedure maintenance cardioplegia doses were repeated every 15 minutes in the range of 300 to 500 mL either antegrade or retrograde.    The following grafts were performed:    LIMA to LAD.  The left anterior descending was a 1.75 mm vessel.  It was free of disease in its mid segment.  The LIMA was anastomosed to the LAD in the standard fashion.  Flow analysis show adequate flow and pulsatility.  Radial artery to distal RCA.  The right coronary had a severe proximal stenosis.  It was a codominant vessel.  In its distal segment it was free of disease.  The radial artery was anastomosed to the distal RCA in the standard fashion with a 7-0 Prolene.  The inflow of this graft was performed in a piggyback technique of the tobar of saphenous vein to the second diagonal branch graft.  Flow analysis showed outstanding flow with pulsatility index.  Saphenous vein to diagonal 2.  The second diagonal branch was a very important vessel supplying the anterior wall.  Therefore I decided to perform anastomose to the graft as the lesion compromises its ostium.  A piece of saphenous vein was anastomosed to the diagonal 2 in the standard fashion with a  7-0 Prolene.  The inflow of this graft was created on the ascending aorta.  Anastomosis was performed with 6-0 Prolene.  Flow analysis showed adequate flow and pulsatility index.  Saphenous vein to obtuse marginal.  The circumflex artery had a moderate lesion in its proximal segment.  Then, I decided to bypass with a piece of saphenous vein.  The best diagonal branch was the last one.  A piece of saphenous vein graft was anastomosed to the OM with a 7-0 Prolene in the standard fashion.  The inflow was created on the ascending aorta.  Flow analysis show adequate flow and pulsatility index.    Once all the grafts were performed, the cross-clamp was removed.  The heart resumed activity quite nicely.  The weaning of cardiopulmonary bypass took place uneventfully.  Initially the heart was bradycardic in sinus which required a bit of pacing.  However, it recovered to its normal sinus rhythm with reasonable heart rate.  He did not require any major tropic support.  Blood was transfused back to the patient, and the cannulas were removed, and cannulation sites were oversewed.  A set of ventricular epicardial wire was left in place.  3 chest tubes were inserted, 1 in each pleural space and 1 in the mediastinum.    Protamine was given and hemostasis obtained.  The chest was closed in the standard fashion with stainless steel wires and reinforced with plates and screw.  Of note, this patient had a quite frail sternum.  The subcutaneous and the skin were closed in layers in all surgical wounds.  The patient was transferred to intensive care unit in a stable condition.  The family was updated in the waiting area.      Complications:  None; patient tolerated the procedure well.    Disposition: ICU - intubated and hemodynamically stable.  Condition: stable         Task Performed by RNFA or Surgical Assistant:  Anthony Carson -harvesting of the greater saphenous vein  Nino Sellers -harvesting of the left radial  artery      Attending Attestation: I performed the procedure.    Deyvi Gonzalez  Phone Number: [phone]

## 2025-01-24 NOTE — CONSULTS
Subjective   Patient is a 75 y.o. female admitted on 1/20/2025  1:15 AM with chief complaint of elective CABG.     HPI:  Jessy Garcia is a 75 year old female with PMH of HTN, HLD, hypothyroidism, carotid stenosis s/p bilateral CEA, obesity who presented to [facility] 1/20/2025 with NSTEMI. Patient reports the pain initially began approximately 4 days ago after shoveling snow. She reports she is intermittently compliant with her blood pressure and cholesterol medications. ED course: significant for HS tropnin (36), D-dimer 717 with CT PE negative for PE. LHC performed 1/21 by Dr. Palacio and was found to have triple vessel disease including mid LAD, prox-Lcx and ostial RCA. ICU consulted for medical management post CABG x4 vessels.    Past Medical History:  History reviewed. No pertinent past medical history.    Past Surgical History:  Past Surgical History:   Procedure Laterality Date    CARDIAC CATHETERIZATION N/A 1/21/2025    Procedure: Left Heart Cath;  Surgeon: Kris Palacio MD;  Location: [facility] Cardiac Cath Lab;  Service: Cardiovascular;  Laterality: N/A;    OTHER SURGICAL HISTORY  03/01/2017    Endarterectomy Carotid Artery        Family History:  No family history on file.     Social History:   reports that she has never smoked. She has never been exposed to tobacco smoke. She has never used smokeless tobacco. She reports that she does not drink alcohol and does not use drugs.    Scheduled Medications:   aspirin, 81 mg, oral, Daily  gabapentin, 100 mg, oral, TID  levothyroxine, 50 mcg, oral, Daily  lovastatin, 40 mg, oral, Daily       Continuous Medications:         PRN Medications:   PRN medications: papaverine, sodium chloride, vancomycin    Review of Systems:   Review of Systems   A 10+ point ROS was completed and otherwise negative except as noted above and per HPI.    Objective   Vitals:  Most Recent:  Vitals:    01/24/25 0700   BP:    Pulse: 63   Resp:    Temp:    SpO2:        24hr Min/Max:  Temp  Min:  36 °C (96.8 °F)  Max: 36.7 °C (98.1 °F)  Pulse  Min: 57  Max: 68  BP  Min: 118/64  Max: 166/79  Resp  Min: 16  Max: 16  SpO2  Min: 89 %  Max: 94 %    LDA:   CVC 01/24/25 Triple lumen Right Internal jugular (Active)   Placement Date/Time: 01/24/25 (c) 0814   Hand Hygiene Performed Prior to CVC Insertion: Yes  Site Prep: Chlorhexidine   Site Prep Agent has Completely Dried Before Insertion: Yes  All 5 Sterile Barriers Used (Gloves, Gown, Cap, Mask, Large Sterile Georgie...   Number of days: 0       Arterial Line 01/24/25 Right Radial (Active)   Placement Date/Time: 01/24/25 (c) 0754   Size: 20 G  Orientation: Right  Location: Radial  Securement Method: Taped  Patient Tolerance: Tolerated well   Number of days: 0       ETT  7 mm (Active)   Placement Date/Time: 01/24/25 (c) 0802   Mask Ventilation: Not attempted  Technique: Direct laryngoscopy  ETT Type: ETT - single  Single Lumen Tube Size: 7 mm  Cuffed: Yes  Laryngoscope: Ash  Blade Size: 4  Location: Oral  Grade View: Full view ...   Number of days: 0       Urethral Catheter Temperature probe;Non-latex 14 Fr. (Active)   Placement Date/Time: 01/24/25 0805   Placed by: cardiac assistants  Hand Hygiene Completed: Yes  Catheter Type: Temperature probe;Non-latex  Tube Size (Fr.): 14 Fr.  Catheter Balloon Size: 10 mL  Urine Returned: Yes   Number of days: 0       Chest Tube 1 Left Pleural 28 Fr (Active)   Placement Date/Time: 01/24/25 1138   Placed by: Dr. Gonzalez  Hand Hygiene Completed: Yes  Tube Number: 1  Chest Tube Orientation: Left  Chest Tube Location: Pleural  Chest Tube Drain Tube Size (Fr): 28 Fr  Chest Tube Drainage System: (c) Dry seal ches...   Number of days: 0       Chest Tube 2 Mediastinal 28 Fr (Active)   Placement Date/Time: 01/24/25 1139   Placed by: Dr. Gonzalez  Hand Hygiene Completed: Yes  Tube Number: 2  Chest Tube Location: Mediastinal  Chest Tube Drain Tube Size (Fr): 28 Fr  Chest Tube Drainage System: (c) Dry seal chest drain   Number of days: 0        Chest Tube 3 Right Pleural 28 Fr (Active)   Placement Date/Time: 01/24/25 1140   Placed by: Dr. Gonzalez  Hand Hygiene Completed: Yes  Tube Number: 3  Chest Tube Orientation: Right  Chest Tube Location: Pleural  Chest Tube Drain Tube Size (Fr): 28 Fr  Chest Tube Drainage System: (c) Dry seal nava...   Number of days: 0       Vent settings:       Hemodynamic parameters for last 24 hours:         Intake/Output Summary (Last 24 hours) at 1/24/2025 1311  Last data filed at 1/24/2025 1227  Gross per 24 hour   Intake 825 ml   Output 800 ml   Net 25 ml     Physical Exam:    Physical Exam   Constitutional: intubated, sedated  EENMT: mucous membranes dry, conjunctivae clear  Head/Neck: neck supple, trachea midline  Respiratory/Thorax: diminished breath sounds, ventilated, chest tubes x3  Cardiovascular: RRR, no murmurs appreciated  Gastrointestinal: soft, nontender, nondistended, bowel sounds appreciated  Extremities: palpable peripheral pulses, no edema  Neurological: intubated, sedated, no focal deficits  Psych: unable to assess  Skin: warm, dry    Lab/Radiology/Diagnostic Review:  Results for orders placed or performed during the hospital encounter of 01/20/25 (from the past 24 hours)   Prepare RBC: 4 Units   Result Value Ref Range    PRODUCT CODE Q1690K14     Unit Number [id number]     Unit ABO A     Unit RH POS     XM INTEP COMP     Dispense Status IS     Blood Expiration Date 2/3/2025 11:59:00 PM EST     PRODUCT BLOOD TYPE 6200     UNIT VOLUME 350     PRODUCT CODE V8118Z85     Unit Number [id number]     Unit ABO A     Unit RH POS     XM INTEP COMP     Dispense Status IS     Blood Expiration Date 2/10/2025 11:59:00 PM EST     PRODUCT BLOOD TYPE 6200     UNIT VOLUME 350     PRODUCT CODE L2004N30     Unit Number [id number]     Unit ABO A     Unit RH POS     XM INTEP COMP     Dispense Status XM     Blood Expiration Date 2/10/2025 11:59:00 PM EST     PRODUCT BLOOD TYPE 6200     UNIT VOLUME 350     PRODUCT  CODE J8305I42     Unit Number [id number]     Unit ABO A     Unit RH POS     XM INTEP COMP     Dispense Status XM     Blood Expiration Date 2/3/2025 11:59:00 PM EST     PRODUCT BLOOD TYPE 6200     UNIT VOLUME 350    Prepare Platelets: 2 Units   Result Value Ref Range    PRODUCT CODE X6971F90     Unit Number [id number]     Unit ABO A     Unit RH POS     Dispense Status TR     Blood Expiration Date 1/26/2025 11:59:00 PM EST     PRODUCT BLOOD TYPE 6200     UNIT VOLUME 282     PRODUCT CODE O7262J76     Unit Number [id number]     Unit ABO A     Unit RH POS     Dispense Status XM     Blood Expiration Date 1/26/2025 11:59:00 PM EST     PRODUCT BLOOD TYPE 6200     UNIT VOLUME 250    Type and screen   Result Value Ref Range    ABO TYPE A     Rh TYPE POS     ANTIBODY SCREEN NEG    VERIFY ABO/Rh Group Test   Result Value Ref Range    ABO TYPE A     Rh TYPE POS    Blood Gas Arterial Full Panel Unsolicited   Result Value Ref Range    POCT pH, Arterial 7.43 (H) 7.38 - 7.42 pH    POCT pCO2, Arterial 40 38 - 42 mm Hg    POCT pO2, Arterial 293 (H) 85 - 95 mm Hg    POCT SO2, Arterial 99 94 - 100 %    POCT Oxy Hemoglobin, Arterial 97.5 94.0 - 98.0 %    POCT Hematocrit Calculated, Arterial 42.0 36.0 - 46.0 %    POCT Sodium, Arterial 136 136 - 145 mmol/L    POCT Potassium, Arterial 4.2 3.5 - 5.3 mmol/L    POCT Chloride, Arterial 103 98 - 107 mmol/L    POCT Ionized Calcium, Arterial 1.18 1.10 - 1.33 mmol/L    POCT Glucose, Arterial 100 (H) 74 - 99 mg/dL    POCT Lactate, Arterial 1.9 0.4 - 2.0 mmol/L    POCT Base Excess, Arterial 2.0 -2.0 - 3.0 mmol/L    POCT HCO3 Calculated, Arterial 26.5 (H) 22.0 - 26.0 mmol/L    POCT Hemoglobin, Arterial 13.9 12.0 - 16.0 g/dL    POCT Anion Gap, Arterial 11 10 - 25 mmo/L    Patient Temperature 37.0 degrees Celsius    FiO2 100 %   ACTIVATED CLOTTING TIME HIGH   Result Value Ref Range    POCT Activated Clotting Time High Range 104 82 - 174 sec   ACTIVATED CLOTTING TIME HIGH   Result Value  Ref Range    POCT Activated Clotting Time High Range 615 (H) 82 - 174 sec   ACTIVATED CLOTTING TIME HIGH   Result Value Ref Range    POCT Activated Clotting Time High Range     Blood Gas Arterial Full Panel Unsolicited   Result Value Ref Range    POCT pH, Arterial 7.42 7.38 - 7.42 pH    POCT pCO2, Arterial 40 38 - 42 mm Hg    POCT pO2, Arterial 401 (H) 85 - 95 mm Hg    POCT SO2, Arterial 99 94 - 100 %    POCT Oxy Hemoglobin, Arterial 97.6 94.0 - 98.0 %    POCT Hematocrit Calculated, Arterial 31.0 (L) 36.0 - 46.0 %    POCT Sodium, Arterial 138 136 - 145 mmol/L    POCT Potassium, Arterial 4.5 3.5 - 5.3 mmol/L    POCT Chloride, Arterial 103 98 - 107 mmol/L    POCT Ionized Calcium, Arterial 0.87 (L) 1.10 - 1.33 mmol/L    POCT Glucose, Arterial 107 (H) 74 - 99 mg/dL    POCT Lactate, Arterial 1.1 0.4 - 2.0 mmol/L    POCT Base Excess, Arterial 1.3 -2.0 - 3.0 mmol/L    POCT HCO3 Calculated, Arterial 25.9 22.0 - 26.0 mmol/L    POCT Hemoglobin, Arterial 10.2 (L) 12.0 - 16.0 g/dL    POCT Anion Gap, Arterial 14 10 - 25 mmo/L    Patient Temperature 37.0 degrees Celsius    FiO2 90 %   Blood Gas Arterial Full Panel Unsolicited   Result Value Ref Range    POCT pH, Arterial 7.44 (H) 7.38 - 7.42 pH    POCT pCO2, Arterial 38 38 - 42 mm Hg    POCT pO2, Arterial 320 (H) 85 - 95 mm Hg    POCT SO2, Arterial 99 94 - 100 %    POCT Oxy Hemoglobin, Arterial 97.7 94.0 - 98.0 %    POCT Hematocrit Calculated, Arterial 30.0 (L) 36.0 - 46.0 %    POCT Sodium, Arterial 135 (L) 136 - 145 mmol/L    POCT Potassium, Arterial 6.1 (HH) 3.5 - 5.3 mmol/L    POCT Chloride, Arterial 104 98 - 107 mmol/L    POCT Ionized Calcium, Arterial 1.06 (L) 1.10 - 1.33 mmol/L    POCT Glucose, Arterial 151 (H) 74 - 99 mg/dL    POCT Lactate, Arterial 1.4 0.4 - 2.0 mmol/L    POCT Base Excess, Arterial 1.6 -2.0 - 3.0 mmol/L    POCT HCO3 Calculated, Arterial 25.8 22.0 - 26.0 mmol/L    POCT Hemoglobin, Arterial 10.0 (L) 12.0 - 16.0 g/dL    POCT Anion Gap, Arterial 11 10 - 25  mmo/L    Patient Temperature 37.0 degrees Celsius    FiO2 80 %    Critical Called By ROYER     Critical Called To [facility]     Critical Call Time 1039     Critical Read Back Y     Critical Note HIGH K    ACTIVATED CLOTTING TIME HIGH   Result Value Ref Range    POCT Activated Clotting Time High Range 581 (H) 82 - 174 sec   Blood Gas Arterial Full Panel Unsolicited   Result Value Ref Range    POCT pH, Arterial 7.40 7.38 - 7.42 pH    POCT pCO2, Arterial 41 38 - 42 mm Hg    POCT pO2, Arterial 271 (H) 85 - 95 mm Hg    POCT SO2, Arterial 99 94 - 100 %    POCT Oxy Hemoglobin, Arterial 97.7 94.0 - 98.0 %    POCT Hematocrit Calculated, Arterial 30.0 (L) 36.0 - 46.0 %    POCT Sodium, Arterial 135 (L) 136 - 145 mmol/L    POCT Potassium, Arterial 6.0 (H) 3.5 - 5.3 mmol/L    POCT Chloride, Arterial 104 98 - 107 mmol/L    POCT Ionized Calcium, Arterial 1.07 (L) 1.10 - 1.33 mmol/L    POCT Glucose, Arterial 159 (H) 74 - 99 mg/dL    POCT Lactate, Arterial 1.2 0.4 - 2.0 mmol/L    POCT Base Excess, Arterial 0.5 -2.0 - 3.0 mmol/L    POCT HCO3 Calculated, Arterial 25.4 22.0 - 26.0 mmol/L    POCT Hemoglobin, Arterial 10.1 (L) 12.0 - 16.0 g/dL    POCT Anion Gap, Arterial 12 10 - 25 mmo/L    Patient Temperature 37.0 degrees Celsius    FiO2 80 %   Blood Gas Arterial Full Panel Unsolicited   Result Value Ref Range    POCT pH, Arterial 7.43 (H) 7.38 - 7.42 pH    POCT pCO2, Arterial 44 (H) 38 - 42 mm Hg    POCT pO2, Arterial 250 (H) 85 - 95 mm Hg    POCT SO2, Arterial 99 94 - 100 %    POCT Oxy Hemoglobin, Arterial 97.5 94.0 - 98.0 %    POCT Hematocrit Calculated, Arterial 29.0 (L) 36.0 - 46.0 %    POCT Sodium, Arterial 137 136 - 145 mmol/L    POCT Potassium, Arterial 5.5 (H) 3.5 - 5.3 mmol/L    POCT Chloride, Arterial 104 98 - 107 mmol/L    POCT Ionized Calcium, Arterial 1.05 (L) 1.10 - 1.33 mmol/L    POCT Glucose, Arterial 149 (H) 74 - 99 mg/dL    POCT Lactate, Arterial 1.3 0.4 - 2.0 mmol/L    POCT Base Excess, Arterial 4.4 (H) -2.0 - 3.0  mmol/L    POCT HCO3 Calculated, Arterial 29.2 (H) 22.0 - 26.0 mmol/L    POCT Hemoglobin, Arterial 9.6 (L) 12.0 - 16.0 g/dL    POCT Anion Gap, Arterial 9 (L) 10 - 25 mmo/L    Patient Temperature 37.0 degrees Celsius    FiO2 80 %   Fibrinogen   Result Value Ref Range    Fibrinogen 176 (L) 200 - 400 mg/dL   Magnesium   Result Value Ref Range    Magnesium 3.70 (H) 1.60 - 2.40 mg/dL   Platelet Count   Result Value Ref Range    Platelets 129 (L) 150 - 450 x10*3/uL   Protime-INR   Result Value Ref Range    Protime 20.1 (H) 9.8 - 12.8 seconds    INR 1.8 (H) 0.9 - 1.1   aPTT   Result Value Ref Range    aPTT 40 (H) 27 - 38 seconds   Blood Gas Arterial Full Panel Unsolicited   Result Value Ref Range    POCT pH, Arterial 7.42 7.38 - 7.42 pH    POCT pCO2, Arterial 38 38 - 42 mm Hg    POCT pO2, Arterial 317 (H) 85 - 95 mm Hg    POCT SO2, Arterial 98 94 - 100 %    POCT Oxy Hemoglobin, Arterial 97.2 94.0 - 98.0 %    POCT Hematocrit Calculated, Arterial 25.0 (L) 36.0 - 46.0 %    POCT Sodium, Arterial 137 136 - 145 mmol/L    POCT Potassium, Arterial 4.4 3.5 - 5.3 mmol/L    POCT Chloride, Arterial 108 (H) 98 - 107 mmol/L    POCT Ionized Calcium, Arterial 1.29 1.10 - 1.33 mmol/L    POCT Glucose, Arterial 112 (H) 74 - 99 mg/dL    POCT Lactate, Arterial 2.4 (H) 0.4 - 2.0 mmol/L    POCT Base Excess, Arterial 0.2 -2.0 - 3.0 mmol/L    POCT HCO3 Calculated, Arterial 24.6 22.0 - 26.0 mmol/L    POCT Hemoglobin, Arterial 8.3 (L) 12.0 - 16.0 g/dL    POCT Anion Gap, Arterial 9 (L) 10 - 25 mmo/L    Patient Temperature 37.0 degrees Celsius    FiO2 100 %   ACTIVATED CLOTTING TIME HIGH   Result Value Ref Range    POCT Activated Clotting Time High Range 115 82 - 174 sec       All other labs and Imaging have been personally reviewed.       Assessment/Plan       Jessy Garcia is a 75 year old female with PMH of HTN, HLD, hypothyroidism, carotid stenosis s/p bilateral CEA, obesity who presented to [facility] 1/20/[facility] with NSTEMI. C performed 1/21 by  Dr. Palacio and was found to have triple vessel disease. S/p coronary artery bypass x 4 (LIMA to LAD, Radial artery to RCA, SVG to OM, SVG to diagonal 2).    Neuro:  - Patient orientated to person place and time  - Sedation: precedex,   - Analgesia: Gabapentin, PRN Tylenol, Oxycodone, Fentanyl per CTS  - No new neurological issues expected  - Maintain normal sleep/wake cycle  - Avoid oversedating patient    Cardiovascular  #s/p CABG x 4 (LIMA to LAD, Radial artery to RCA, SVG to OM, SVG to diagonal 2)  #Hx HTN  #Hx HLD  - Last TTE 1/20/25 showed EF 55-60%  - Patient with postop hypotension after CABG surgery, expected  - Pressors: Levo. Maintain MAP >70, wean as tolerated.  - On asa, lovastatin  - Diuresis per CTS. Keep Mg>2, K>4.    Pulmonary:  - Patient with postop respiratory insufficiency, expected.    - Patient to be extubated. Wean as tolerated, maintain O2 sats>92%.  - Chest tube management per CTS  - CXR shows post CABG changes, central lines, clips. Atelectasis.  - Encourage incentive spirometer use q1  - Daily CXR    GI:  - Diet: NPO advance per CTS  - Prophylaxis: PPI    Renal:   - No current issues  - Nava in place, strict I&O's  - Trend renal panel, replete electrolytes PRN    Endocrine:  #Hypothyroidism  - Continue home levothyroxine  - SSI, maintain BG between 140-180s    Heme/Onc:  - DVT PPX: Lovenox  - Postop anemia as expected  - Trend H&H    ID:  - Postop leukocytosis most likely reactive, as expected  - Prophylactic antibiotics per CTS  - CXR no acute pulmonary process    Skin/MSK:  - No acute issues    ICU CHECK LIST:   Antimicrobials: ancef per CTS  Oxygen: intubated  Feeding: NPO, advance per CTS  Fluids:   Analgesia:   Sedation:   Thromboprophylaxis: Lovenox  Ulcer prophylaxis: PPI  Glycemic control: SSI  Bowel care: polly-colase, metamucil  Indwelling catheters: chest tubes x3, nava catheter  Lines: R HAILEY, DAMARIS Triana, PIVs    Code Status: FULL    This is a preliminary note written by the  resident. Please wait for attending addendum for finalization of note and recommendations.    Adithya Otoole DO, PhD  Internal Medicine PGY2

## 2025-01-24 NOTE — ANESTHESIA PROCEDURE NOTES
Airway  Date/Time: 1/24/2025 8:02 AM  Urgency: elective    Airway not difficult    Staffing  Performed: RENE   Authorized by: Stoney Becerra MD    Performed by: RENE Guo  Patient location during procedure: OR    Indications and Patient Condition  Indications for airway management: anesthesia and airway protection  Spontaneous ventilation: present  Sedation level: deep  Preoxygenated: yes  MILS maintained throughout  Mask difficulty assessment: 0 - not attempted    Final Airway Details  Final airway type: endotracheal airway      Successful airway: ETT  Cuffed: yes   Successful intubation technique: direct laryngoscopy  Facilitating devices/methods: cricoid pressure  Endotracheal tube insertion site: oral  Blade: Ash  Blade size: #4  ETT size (mm): 7.0  Cormack-Lehane Classification: grade I - full view of glottis  Placement verified by: chest auscultation and capnometry   Measured from: teeth  ETT to teeth (cm): 19  Number of attempts at approach: 1  Number of other approaches attempted: 0

## 2025-01-24 NOTE — PROGRESS NOTES
[place]   Cardiothoracic Surgery   Progress Note        Jessy Garcia is a 75 y.o. female on day 3 of admission presenting with Chest pain, unspecified type.    Subjective     Interval HPI: Seen and assessed patient this afternoon, s/p CABG x4.    Review of Systems   Unable to perform ROS: Intubated         Objective   Physical Exam  Physical Exam  Vitals and nursing note reviewed.   Constitutional:       General: She is not in acute distress.     Appearance: Normal appearance.      Interventions: She is sedated and intubated.   HENT:      Head: Normocephalic and atraumatic.      Nose: Nose normal.      Mouth/Throat:      Mouth: Mucous membranes are dry.      Pharynx: Oropharynx is clear. No oropharyngeal exudate.   Eyes:      Extraocular Movements: Extraocular movements intact.      Pupils: Pupils are equal, round, and reactive to light.   Neck:      Comments: Right internal jugular triple lumen catheter      Cardiovascular:      Rate and Rhythm: Normal rate and regular rhythm.      Pulses: Normal pulses.      Heart sounds: Normal heart sounds, S1 normal and S2 normal.   Pulmonary:      Effort: Pulmonary effort is normal. No tachypnea or bradypnea. She is intubated.      Breath sounds: Examination of the right-lower field reveals decreased breath sounds. Examination of the left-lower field reveals decreased breath sounds. Decreased breath sounds present.   Chest:      Comments: Bilateral pleural chest tubes  Mediastinal chest tubes     Abdominal:      General: Abdomen is flat.   Musculoskeletal:      Cervical back: Normal range of motion and neck supple.   Skin:     General: Skin is cool.      Capillary Refill: Capillary refill takes less than 2 seconds.      Comments: Mediastinal Incision with post-op dressing, C/D/I    Neurological:      Mental Status: She is unresponsive.      GCS: GCS eye subscore is 1. GCS motor subscore is 1.      Comments: Emerging from OR sedation             Last Recorded Vitals  Vitals:    01/24/25 0700 01/24/25 1332 01/24/25 1345 01/24/25 1400   BP:       BP Location:       Patient Position:       Pulse: 63   63   Resp:    16   Temp:   35.7 °C (96.3 °F)    TempSrc:   Temporal    SpO2:  93%  95%   Weight:  86.6 kg (190 lb 14.7 oz)     Height:            Intake/Output last 3 Shifts:  I/O last 3 completed shifts:  In: 240 (2.8 mL/kg) [P.O.:240]  Out: - (0 mL/kg)   Weight: 86.6 kg     Inpatient Medications  acetaminophen, 650 mg, oral, q6h  albumin human, 12.5 g, intravenous, q1h  aspirin, 81 mg, oral, Daily  ceFAZolin, 2 g, intravenous, q8h  [START ON 1/25/2025] enoxaparin, 40 mg, subcutaneous, Daily  gabapentin, 100 mg, oral, TID  insulin lispro, 0-15 Units, subcutaneous, q4h  levothyroxine, 50 mcg, oral, Daily  lidocaine, 1 patch, transdermal, q24h  lovastatin, 40 mg, oral, Daily  magnesium oxide, 400 mg, oral, Daily  methocarbamol, 500 mg, oral, q8h CHRIS  mupirocin, 1 Application, Each Nostril, BID  oxygen, , inhalation, Continuous - Inhalation  oxygen, , inhalation, Continuous - Inhalation  [START ON 1/25/2025] pantoprazole, 40 mg, oral, Daily before breakfast   Or  [START ON 1/25/2025] pantoprazole, 40 mg, intravenous, Daily before breakfast  perflutren lipid microspheres, 0.5-10 mL of dilution, intravenous, Once in imaging  perflutren protein A microsphere, 0.5 mL, intravenous, Once in imaging  psyllium, 1 packet, oral, Daily  sennosides-docusate sodium, 2 tablet, oral, BID  sugammadex, 200 mg, intravenous, Once  sulfur hexafluoride microsphr, 2 mL, intravenous, Once in imaging      Continuous medications  dexmedeTOMIDine, 0-1.5 mcg/kg/hr, Last Rate: 0.37 mcg/kg/hr (01/24/25 1331)  EPINEPHrine, 0-2 mcg/kg/min  norepinephrine, 0-3 mcg/kg/min      PRN medications  PRN medications: albumin human, alteplase, calcium chloride, calcium chloride, fentaNYL, magnesium sulfate, magnesium sulfate, metoclopramide **OR** metoclopramide, naloxone, ondansetron ODT **OR**  ondansetron, oxyCODONE, oxyCODONE, potassium chloride CR **OR** potassium chloride, potassium chloride CR **OR** potassium chloride, potassium chloride, potassium chloride, sodium chloride     LDA:       Relevant Results  Lab Review  Results from last 7 days   Lab Units 01/24/25  1352   WBC AUTO x10*3/uL 14.5*   HEMOGLOBIN g/dL 9.9*   HEMATOCRIT % 29.8*   PLATELETS AUTO x10*3/uL 169     Results from last 7 days   Lab Units 01/24/25  1352 01/22/25  0715 01/20/25  0148   SODIUM mmol/L 144   < > 138   POTASSIUM mmol/L 3.5   < > 4.2   CHLORIDE mmol/L 110*   < > 103   CO2 mmol/L 23   < > 27   BUN mg/dL 14   < > 21   CREATININE mg/dL 0.79   < > 0.86   CALCIUM mg/dL 7.8*   < > 9.9   PROTEIN TOTAL g/dL  --   --  8.3*   BILIRUBIN TOTAL mg/dL  --   --  0.2   ALK PHOS U/L  --   --  89   ALT U/L  --   --  16   AST U/L  --   --  17   GLUCOSE mg/dL 158*   < > 118*    < > = values in this interval not displayed.     Results from last 7 days   Lab Units 01/24/25  1352   MAGNESIUM mg/dL 2.75*     Results from last 7 days   Lab Units 01/24/25  1352   POCT PH, ARTERIAL pH 7.44*   POCT PCO2, ARTERIAL mm Hg 32*   POCT PO2, ARTERIAL mm Hg 81*   POCT HCO3 CALCULATED, ARTERIAL mmol/L 21.7*   POCT BASE EXCESS, ARTERIAL mmol/L -1.9         Radiographic Testing    Echo:  Transthoracic Echo (TTE) Complete 01/20/2025  PHYSICIAN INTERPRETATION:  Left Ventricle: Left ventricular ejection fraction is normal, by visual estimate at 55-60%. There are no regional left ventricular wall motion abnormalities. The left ventricular cavity size is normal. There is mildly increased septal and mildly increased posterior left ventricular wall thickness. There is left ventricular concentric remodeling. Spectral Doppler shows a Grade I (impaired relaxation pattern) of left ventricular diastolic filling with normal left atrial filling pressure.  Left Atrium: The left atrium is normal in size.  Right Ventricle: The right ventricle is normal in size. There is  normal right ventricular global systolic function.  Right Atrium: The right atrium is normal in size.  Aortic Valve: The aortic valve is trileaflet. There is mild aortic valve thickening. There is evidence of mildly elevated transaortic gradients consistent with sclerosis of the aortic valve. The aortic valve dimensionless index is 0.65. There is trace aortic valve regurgitation. The peak instantaneous gradient of the aortic valve is 12 mmHg. The mean gradient of the aortic valve is 8 mmHg.  Mitral Valve: The mitral valve is mildly thickened. The peak instantaneous gradient of the mitral valve is 8 mmHg. There is moderate mitral valve regurgitation. The mitral regurgitant orifice area is 7 mm2. The mitral regurgitant volume is 15.75 ml.  Tricuspid Valve: The tricuspid valve is structurally normal. There is trace tricuspid regurgitation.  Pulmonic Valve: The pulmonic valve is structurally normal. There is trace pulmonic valve regurgitation.  Pericardium: There is no pericardial effusion noted.  Aorta: The aortic root is normal.  Systemic Veins: The inferior vena cava appears normal in size.  In comparison to the previous echocardiogram(s): Compared with study dated 5/16/2011,.        CONCLUSIONS:   1. Left ventricular ejection fraction is normal, by visual estimate at 55-60%.   2. Spectral Doppler shows a Grade I (impaired relaxation pattern) of left ventricular diastolic filling with normal left atrial filling pressure.   3. There is normal right ventricular global systolic function.   4. Moderate mitral valve regurgitation.   5. Aortic valve sclerosis.    Ejection Fractions:  EF   Date/Time Value Ref Range Status   01/20/2025 02:15 PM 58 %      Cath:  Cardiac Catheterization Procedure 01/21/2025  Coronary Angiography Comments:  64-year-old woman with a history of bilateral carotid endarterectomy, uncontrolled hypertension, uncontrolled hyperlipidemia presents with unstable angina. Urgent heart cath recommended.      Fluoroscopy demonstrated moderate calcification of the coronary tree.     Left main: Short vessel. No significant disease.  LAD: Large vessel. Tortuous and calcified. In the mid LAD there is a complex 80 to 90% stenosis involving a large diagonal and moderate-sized septal. The remainder the LAD has moderate disease.  Circumflex: Moderate size vessel. Proximal 80% stenosis. Remainder the vessel is free of disease.  RCA: Dominant vessel. Ostial 90% stenosis.  LVEDP 21 mmHg. Left atrial angiography not performed.    Imaging:  Vascular US upper extremity arterial duplex bilateral   Final Result      Vascular US lower extremity vein mapping bilateral   Final Result      Vascular US carotid artery duplex bilateral   Final Result      Vascular US palmar arch evaluation   Final Result      Cardiac Catheterization Procedure   Final Result      Transthoracic Echo (TTE) Complete   Final Result      CT angio chest for pulmonary embolism   Final Result   1.No CT evidence of pulmonary embolism.   2.Normal aorta.   3.Small pulmonary nodule in the right upper lobe measuring 0.4   cm. As an isolated phenomena likely of little significance but   correlate with any malignancy history.   4.No evidence of pulmonary artery filling defect to suggest a   pulmonary embolism.   5.No evidence of aortic dissection or aneurysmal dilatation.   6.Small distal esophageal hiatal hernia.   7.Atherosclerotic changes of the abdominal aorta and its   branches. There is some narrowing of the proximal celiac axis   estimated be approximately 50-60%.   Signed by Albino Ritchie MD      XR chest 1 view   Final Result   No acute process.   Signed by Jo Veras MD      Transesophageal Echo (BAKARI)    (Results Pending)   XR chest 1 view    (Results Pending)   XR chest 1 view    (Results Pending)             History of Present Illness: Jessy Garcia is a 75 y.o. female with a PMH significant for HTN, HLD, hypothyroidism, carotid stenosis s/p bilateral  "CEA, obesity who presented to [facility] on 1/20/2025 with NSTEMI. Patient reports the pain initially began approximately 4 days ago after shoveling snow. She reports the pain radiates across her chest she describes the pain as a \"hurt\"; severity 8/10 at its worst. She does report the pain is more noticeable with minimal exertion but also states the pain occurs at rest. She does report some associated shortness of breath, she denies any nausea, vomiting, diaphoresis. She denies any recent fevers chills, abdominal pain, headache, dizziness. She reports she is intermittently compliant with her blood pressure and cholesterol medications.      ED course: significant for HS tropnin (36), D-dimer 717 with CT PE negative for PE.     LHC performed 1/21 by Dr. Palacio and she was found to have triple vessel disease. Cardiac surgery was consulted for CABG evaluation.     Daily Events    1/22/25: No acute events overnight; currently working on staging an operative date. Continue inpatient admission given NSTEMI.      1/23/25: Continues with no acute anginal complaints overnight. Plan for patient to undergo bypass grafting tomorrow on 1/24/25 with Dr. Deyvi Gonzalez.     1/24/25: No acute events overnight; plan for OR today with Dr. Deyvi Gonzalez.     1/24/25 (Post-op): Patient arrived to the ICU in critically ill but stable condition. Currently intubate & sedated without need for vasopressors/dilators. Plan to reverse paralytic therapy and wean mechanical ventilation towards extubation.     - Vital Signs: HR 63, /59 (82), SpO2 94%   - Hemodynamics: CVP 9, CO 3.8, CI 2.1, SVR 1367  - Ventilator Settings: PRVC-AC / FiO2 50% / PEEP 5 / RR 16     - Pump time: 90 minutes     Intake & Output:   - Initial R Pleural Chest Tube: 140mL  - Initial L Pleural Chest Tube: 20mL  - Initial Mediastinal Chest Tube: 40mL        Assessment & Plan         Multivessel Coronary Artery Disease  - Patient admitted with " NSTMEI on 1/20/25  --> [facility] on 1/21/25 revealed diffuse multivessel disease including the mid LAD, prox-Lcx and ostial RCA  - S/p CABG x 4 on 1/24/25 with Dr. Deyvi Gonzalez   --> LIMA-LAD, SVG-Diag, SVG-OM, RA-RCA  - Continue on ASA 81mg and Lovastatin 40mg   - Holding beta-blocker in initial post op period  - Maintain MS/pl chest tubes to -20cm H20 continuous suction   - Daily CXR while chest tube intact        Radial Grafting  - S/p radial harvest site  - Consider addition of nitrates as indicated        Mediastinal Incision  - Remove post-op dressing on POD #2  - Dressing C/D/I       Acute Post-Op Pain  - As expected   - Multimodal pain control   --> Scheduled: Acetaminophen, magnesium oxide 400mg QD, gabapentin 100mg TID, methocarbamol 500mg q6h   --> PRN: oxycodone & fentanyl  - Bowel regimen while taking narcotics        Risk for Post-Op Arrhythmia  - Ventricular wires placed  - Discontinue V-wires prior to DC  - Telemetry until discharged       Acute Postoperative Respiratory Insufficiency   - As expected following CABG with post-op atelectasis  - Currently maintained on mechanical ventilator   - Coughing and deep breathing exercises with Incentive spirometry  - Out of bed, early and aggressive mobilization with assistance  - Oxygen as needed, wean to keep saturation above 92%  - ICU intensivist/pulmonologist consulted  - Albuterol nebulizers as needed       Risk for Fluid Volume Overload  - Pre-Op Weight: 88.6 kg   - Strict I& Os and daily weights    - Monitor CVP and hemodynamics        Acute Post-Op Blood-Loss Anemia  - Pre-Op H/H:  14/44.4  --> Immediate post-op: 9.9/29.8  - Monitor h/h in the initial post op period  - Monitor chest tubes for post op hemorrhage   - Multivitamin/iron tablet   - Daily CBC while in hospital       Post-Op Leukocytosis  - Pre-Op WBC: 7.3  --> Immediate post-op: 14.5  - Afebrile, consider elevations as reactive to surgery   - Post-op ATB Q12 for 48hrs  - Daily cbc while in  hospital        Dyslipidemia  - Home Medications: Lovastatin 40mg   - Continued        Hypothyroidism   - Home Medications: Levothyroxine 50 mcg  - Continued        Risk for Post-op Hyperglycemia  - Home Medications: None  - HbA1c: 5.8  - Goal for BG <150 in the post-operative period  - SSI       Risk for Electrolyte Disturbances  - Optimize electrolytes per [facility] protocol      Bowel Regimen  - Senna-S BID   - PRN suppositories and miralax    Prophylaxis  - GI: PPI  - DVT: Duy-Hose & starting enoxaparin on POD #1  - MRSA: Pending (1/24)  - PT/OT     Disposition  - CVICU    Above patient seen and plan discussed with Dr. Deyvi Gonzalez, Plan as above.     PATSY Jauregui-CNP      Critical Care Billing Statement    I have reviewed and evaluated the most recent data and results, personally examined the patient, and formulated the plan of care as presented above. This patient was critically ill and required continued critical care treatment. Teaching and any separately billable procedures are not included in the time calculation.    Billing Provider Critical Care Time: 45 minutes

## 2025-01-24 NOTE — PROGRESS NOTES
[facility]   Cardiothoracic Surgery   Progress Note        Jessy Garcia is a 75 y.o. female on day 3 of admission presenting with Chest pain, unspecified type.    Subjective     Interval HPI: Seen and assessed patient this morning while they were sitting up in bed; in no acute distress and without anginal complaints. Plan for OR today.     Review of Systems   Constitutional: Negative for decreased appetite and malaise/fatigue.   HENT:  Negative for congestion.    Eyes:  Negative for blurred vision and double vision.   Cardiovascular:  Negative for chest pain, dyspnea on exertion, irregular heartbeat, leg swelling, orthopnea and palpitations.   Respiratory:  Negative for cough and shortness of breath.    Endocrine: Negative for cold intolerance and heat intolerance.   Skin:  Negative for nail changes, poor wound healing and rash.   Musculoskeletal:  Negative for arthritis, muscle cramps, muscle weakness, myalgias and stiffness.   Gastrointestinal:  Negative for bloating, nausea and vomiting.   Genitourinary:  Negative for frequency, hesitancy and urgency.   Neurological:  Negative for dizziness, focal weakness, light-headedness and weakness.   Psychiatric/Behavioral:  Negative for altered mental status.    Allergic/Immunologic: Negative for environmental allergies.         Objective   Physical Exam  Physical Exam  Vitals and nursing note reviewed.   Constitutional:       General: She is not in acute distress.     Appearance: Normal appearance. She is not ill-appearing.   HENT:      Head: Normocephalic and atraumatic.      Nose: Nose normal.      Mouth/Throat:      Mouth: Mucous membranes are moist.      Pharynx: Oropharynx is clear.   Eyes:      Extraocular Movements: Extraocular movements intact.      Conjunctiva/sclera: Conjunctivae normal.      Pupils: Pupils are equal, round, and reactive to light.   Cardiovascular:      Rate and Rhythm: Normal rate and regular rhythm.       Pulses: Normal pulses.      Heart sounds: Normal heart sounds, S1 normal and S2 normal. No murmur heard.     No systolic murmur is present.      No friction rub. No gallop.   Pulmonary:      Effort: Pulmonary effort is normal.      Breath sounds: Normal breath sounds.   Abdominal:      General: Abdomen is flat. Bowel sounds are normal. There is no distension.      Palpations: Abdomen is soft.   Musculoskeletal:         General: Normal range of motion.      Cervical back: Normal range of motion and neck supple.      Right lower leg: No edema.      Left lower leg: No edema.   Skin:     General: Skin is warm and dry.      Capillary Refill: Capillary refill takes less than 2 seconds.      Findings: No lesion.      Nails: There is no clubbing.   Neurological:      General: No focal deficit present.      Mental Status: She is alert and oriented to person, place, and time. Mental status is at baseline.      Cranial Nerves: Cranial nerves 2-12 are intact.      Sensory: Sensation is intact.      Motor: Motor function is intact.   Psychiatric:         Attention and Perception: Attention and perception normal.         Mood and Affect: Mood normal.         Speech: Speech normal.         Behavior: Behavior normal.         Thought Content: Thought content normal.         Cognition and Memory: Cognition and memory normal.         Judgment: Judgment normal.         Last Recorded Vitals  Vitals:    01/24/25 0300 01/24/25 0400 01/24/25 0500 01/24/25 0700   BP:  166/79     BP Location:  Right arm     Patient Position:  Lying     Pulse: 60 61 65 63   Resp:       Temp:  36.4 °C (97.5 °F)     TempSrc:  Temporal     SpO2: (!) 89%      Weight:   86.6 kg (190 lb 14.7 oz)    Height:            Intake/Output last 3 Shifts:  I/O last 3 completed shifts:  In: 240 (2.8 mL/kg) [P.O.:240]  Out: - (0 mL/kg)   Weight: 86.6 kg     Inpatient Medications  aspirin, 81 mg, oral, Daily  gabapentin, 100 mg, oral, Nightly  levothyroxine, 50 mcg, oral,  Daily  lovastatin, 40 mg, oral, Daily  metoprolol tartrate, 50 mg, oral, BID      Continuous medications     PRN medications  PRN medications: acetaminophen **OR** acetaminophen **OR** acetaminophen, ALPRAZolam, HYDROmorphone     LDA:       Relevant Results  Lab Review  Results from last 7 days   Lab Units 01/22/25  0715   WBC AUTO x10*3/uL 7.3   HEMOGLOBIN g/dL 14.0   HEMATOCRIT % 44.4   PLATELETS AUTO x10*3/uL 265     Results from last 7 days   Lab Units 01/22/25  0715 01/20/25  0148   SODIUM mmol/L 139 138   POTASSIUM mmol/L 4.2 4.2   CHLORIDE mmol/L 106 103   CO2 mmol/L 26 27   BUN mg/dL 17 21   CREATININE mg/dL 0.80 0.86   CALCIUM mg/dL 8.9 9.9   PROTEIN TOTAL g/dL  --  8.3*   BILIRUBIN TOTAL mg/dL  --  0.2   ALK PHOS U/L  --  89   ALT U/L  --  16   AST U/L  --  17   GLUCOSE mg/dL 95 118*     Results from last 7 days   Lab Units 01/20/25  0148   MAGNESIUM mg/dL 2.12             Radiographic Testing    Echo:  Transthoracic Echo (TTE) Complete 01/20/2025  PHYSICIAN INTERPRETATION:  Left Ventricle: Left ventricular ejection fraction is normal, by visual estimate at 55-60%. There are no regional left ventricular wall motion abnormalities. The left ventricular cavity size is normal. There is mildly increased septal and mildly increased posterior left ventricular wall thickness. There is left ventricular concentric remodeling. Spectral Doppler shows a Grade I (impaired relaxation pattern) of left ventricular diastolic filling with normal left atrial filling pressure.  Left Atrium: The left atrium is normal in size.  Right Ventricle: The right ventricle is normal in size. There is normal right ventricular global systolic function.  Right Atrium: The right atrium is normal in size.  Aortic Valve: The aortic valve is trileaflet. There is mild aortic valve thickening. There is evidence of mildly elevated transaortic gradients consistent with sclerosis of the aortic valve. The aortic valve dimensionless index is 0.65.  There is trace aortic valve regurgitation. The peak instantaneous gradient of the aortic valve is 12 mmHg. The mean gradient of the aortic valve is 8 mmHg.  Mitral Valve: The mitral valve is mildly thickened. The peak instantaneous gradient of the mitral valve is 8 mmHg. There is moderate mitral valve regurgitation. The mitral regurgitant orifice area is 7 mm2. The mitral regurgitant volume is 15.75 ml.  Tricuspid Valve: The tricuspid valve is structurally normal. There is trace tricuspid regurgitation.  Pulmonic Valve: The pulmonic valve is structurally normal. There is trace pulmonic valve regurgitation.  Pericardium: There is no pericardial effusion noted.  Aorta: The aortic root is normal.  Systemic Veins: The inferior vena cava appears normal in size.  In comparison to the previous echocardiogram(s): Compared with study dated 5/16/2011,.        CONCLUSIONS:   1. Left ventricular ejection fraction is normal, by visual estimate at 55-60%.   2. Spectral Doppler shows a Grade I (impaired relaxation pattern) of left ventricular diastolic filling with normal left atrial filling pressure.   3. There is normal right ventricular global systolic function.   4. Moderate mitral valve regurgitation.   5. Aortic valve sclerosis.    Ejection Fractions:  EF   Date/Time Value Ref Range Status   01/20/2025 02:15 PM 58 %      Cath:  Cardiac Catheterization Procedure 01/21/2025  Coronary Angiography Comments:  64-year-old woman with a history of bilateral carotid endarterectomy, uncontrolled hypertension, uncontrolled hyperlipidemia presents with unstable angina. Urgent heart cath recommended.     Fluoroscopy demonstrated moderate calcification of the coronary tree.     Left main: Short vessel. No significant disease.  LAD: Large vessel. Tortuous and calcified. In the mid LAD there is a complex 80 to 90% stenosis involving a large diagonal and moderate-sized septal. The remainder the LAD has moderate disease.  Circumflex: Moderate  "size vessel. Proximal 80% stenosis. Remainder the vessel is free of disease.  RCA: Dominant vessel. Ostial 90% stenosis.  LVEDP 21 mmHg. Left atrial angiography not performed.    Imaging:  Vascular US upper extremity arterial duplex bilateral   Final Result      Vascular US lower extremity vein mapping bilateral   Final Result      Vascular US carotid artery duplex bilateral   Final Result      Vascular US palmar arch evaluation   Final Result      Cardiac Catheterization Procedure   Final Result      Transthoracic Echo (TTE) Complete   Final Result      CT angio chest for pulmonary embolism   Final Result   1.No CT evidence of pulmonary embolism.   2.Normal aorta.   3.Small pulmonary nodule in the right upper lobe measuring 0.4   cm. As an isolated phenomena likely of little significance but   correlate with any malignancy history.   4.No evidence of pulmonary artery filling defect to suggest a   pulmonary embolism.   5.No evidence of aortic dissection or aneurysmal dilatation.   6.Small distal esophageal hiatal hernia.   7.Atherosclerotic changes of the abdominal aorta and its   branches. There is some narrowing of the proximal celiac axis   estimated be approximately 50-60%.   Signed by Albino Ritchie MD      XR chest 1 view   Final Result   No acute process.   Signed by Jo Veras MD                History of Present Illness: Jessy Garcia is a 75 y.o. female with a PMH significant for HTN, HLD, hypothyroidism, carotid stenosis s/p bilateral CEA, obesity who presented to [facility] on 1/20/2025 with NSTEMI. Patient reports the pain initially began approximately 4 days ago after shoveling snow. She reports the pain radiates across her chest she describes the pain as a \"hurt\"; severity 8/10 at its worst. She does report the pain is more noticeable with minimal exertion but also states the pain occurs at rest. She does report some associated shortness of breath, she denies " any nausea, vomiting, diaphoresis. She denies any recent fevers chills, abdominal pain, headache, dizziness. She reports she is intermittently compliant with her blood pressure and cholesterol medications.      ED course: significant for HS tropnin (36), D-dimer 717 with CT PE negative for PE.     LHC performed 1/21 by Dr. Palaico and she was found to have triple vessel disease. Cardiac surgery was consulted for CABG evaluation.     Daily Events    1/22/25: No acute events overnight; currently working on staging an operative date. Continue inpatient admission given NSTEMI.      1/23/25: Continues with no acute anginal complaints overnight. Plan for patient to undergo bypass grafting tomorrow on 1/24/25 with Dr. Deyvi Gonzalez.     1/24/25: No acute events overnight; plan for OR today with Dr. Deyvi Gonzalez.     Assessment & Plan       Multivessel CAD   - Patient admitted with NSTMEI on 1/20/25  --> LHC on 1/21/25 revealed diffuse multivessel disease including the mid LAD, prox-Lcx and ostial RCA  --> Coronary pathology would favor bypass grafting  - OR date: 10/24/25 - AM  - Continue on ASA 81mg, metoprolol tartrate 50mg BID and lovastatin 40mg     STS ACSD:  Procedure Type: Isolated CABG    Perioperative Outcome   Estimate %  Operative Mortality   6.19%  Morbidity & Mortality   15.5%  Stroke     1.32%  Renal Failure    1.64%  Reoperation    3.69%  Prolonged Ventilation   12%  Deep Sternal Wound Infection 0.909%  Long Hospital Stay (>14 days) 5.57%  Short Hospital Stay (<6 days)  26.5%         Dyslipidemia  - Home Medications: Lovastatin 40mg   - Continued        Hypothyroidism   - Home Medications: Levothyroxine 50 mcg  - Continued     Above patient seen and plan discussed with Dr. Deyvi Gonzalez, Plan as above.     Adam Dumas, APRN-CNP

## 2025-01-24 NOTE — ANESTHESIA POSTPROCEDURE EVALUATION
Patient: Jessy Garcia    Procedure Summary       Date: 01/24/25 Room / Location: [facility] OR 10 / Virtual [facility] OR    Anesthesia Start: 0729 Anesthesia Stop: 1327    Procedure: CORONARY ARTERY BYPASS GRAFT x4 (Chest) Diagnosis:       NSTEMI (non-ST elevated myocardial infarction) (Multi)      (NSTEMI (non-ST elevated myocardial infarction) (Multi) [I21.4])    Surgeons: Deyvi Gonzalez MD Responsible Provider: Stoney Becerra MD    Anesthesia Type: general ASA Status: 4            Anesthesia Type: general    Vitals Value Taken Time   /68 01/24/25 1327   Temp 36.4 01/24/25 1327   Pulse 72 01/24/25 1327   Resp 14 01/24/25 1327   SpO2 99 01/24/25 1327       Anesthesia Post Evaluation    Patient participation: complete - patient participated  Level of consciousness: awake  Pain management: adequate  Airway patency: patent  Cardiovascular status: acceptable  Respiratory status: acceptable  Hydration status: acceptable  Postoperative Nausea and Vomiting: none        No notable events documented.

## 2025-01-24 NOTE — CARE PLAN
The patient's goals for the shift include      The clinical goals for the shift include patient will remain hemodynamically stable      Problem: Skin  Goal: Promote/optimize nutrition  Outcome: Progressing     Problem: Pain  Goal: Takes deep breaths with improved pain control throughout the shift  Outcome: Progressing  Goal: Turns in bed with improved pain control throughout the shift  Outcome: Progressing  Goal: Walks with improved pain control throughout the shift  Outcome: Progressing  Goal: Performs ADL's with improved pain control throughout shift  Outcome: Progressing  Goal: Participates in PT with improved pain control throughout the shift  Outcome: Progressing  Goal: Free from opioid side effects throughout the shift  Outcome: Progressing  Goal: Free from acute confusion related to pain meds throughout the shift  Outcome: Progressing     Problem: Pain - Adult  Goal: Verbalizes/displays adequate comfort level or baseline comfort level  Outcome: Progressing     Problem: Safety - Adult  Goal: Free from fall injury  Outcome: Progressing     Problem: Discharge Planning  Goal: Discharge to home or other facility with appropriate resources  Outcome: Progressing     Problem: Chronic Conditions and Co-morbidities  Goal: Patient's chronic conditions and co-morbidity symptoms are monitored and maintained or improved  Outcome: Progressing     Problem: Nutrition  Goal: Nutrient intake appropriate for maintaining nutritional needs  Outcome: Progressing     Problem: Resident is recovering from cardiovascular surgery  Goal: I will maintain and build strength.  Outcome: Progressing  Goal: I will decrease complications and risks after surgery  Outcome: Progressing     Problem: Respiratory  Goal: Clear secretions with interventions this shift  Outcome: Progressing  Goal: Minimize anxiety/maximize coping throughout shift  Outcome: Progressing  Goal: Minimal/no exertional discomfort or dyspnea this shift  Outcome:  Progressing  Goal: No signs of respiratory distress (eg. Use of accessory muscles. Peds grunting)  Outcome: Progressing  Goal: Patent airway maintained this shift  Outcome: Progressing  Goal: Tolerate mechanical ventilation evidenced by VS/agitation level this shift  Outcome: Progressing  Goal: Tolerate pulmonary toileting this shift  Outcome: Progressing  Goal: Verbalize decreased shortness of breath this shift  Outcome: Progressing  Goal: Wean oxygen to maintain O2 saturation per order/standard this shift  Outcome: Progressing  Goal: Increase self care and/or family involvement in next 24 hours  Outcome: Progressing     Problem: Meds/Post-op Pain  Goal: Pain controlled to tolerate pain level  Outcome: Progressing  Goal: Tolerates prescribed medication  Outcome: Progressing     Problem: DVT/VTE Prevention/Activity  Goal: No decrease in circulation/sensation  Outcome: Progressing  Goal: Prevent skin breakdown  Outcome: Progressing  Goal: Return to preop oxygenation status  Outcome: Progressing  Goal: Tolerates optimal activity  Outcome: Progressing  Goal: Increase self care and/or family involvement in 24 hrs.  Outcome: Progressing     Problem: Wound care/infection prevention  Goal: No signs of infection in 24 hrs.  Outcome: Progressing  Goal: No unexpected bleeding from incision this shift  Outcome: Progressing     Problem: Diet/fluid balance  Goal: Adequate urinary output  Outcome: Progressing  Goal: Free from nausea/vomiting  Outcome: Progressing  Goal: Return in bowel function  Outcome: Progressing  Goal: Tolerates prescribed diet  Outcome: Progressing     Problem: Other goals  Goal: No change in neurological status  Outcome: Progressing  Goal: Stabilize vital signs (return to 10% of baseline)  Outcome: Progressing

## 2025-01-24 NOTE — ANESTHESIA PROCEDURE NOTES
Peripheral Block    Patient location during procedure: OR  Start time: 1/24/2025 1:00 PM  End time: 1/24/2025 1:15 PM  Reason for block: at surgeon's request and post-op pain management  Staffing  Performed: attending   Authorized by: Stoney Becerra MD    Performed by: Stoney Becerra MD  Preanesthetic Checklist  Completed: patient identified, IV checked, site marked, risks and benefits discussed, surgical consent, monitors and equipment checked, pre-op evaluation and timeout performed   Timeout performed at: 1/24/2025 1:00 PM  Peripheral Block  Patient position: laying flat  Prep: ChloraPrep  Patient monitoring: heart rate, cardiac monitor and continuous pulse ox  Block type: serratus anterior  Laterality: B/L  Injection technique: single-shot  Guidance: Doppler guided  Local infiltration: bupivicaine  Infiltration strength: 0.3 %  Dose: 20 mL  Needle  Needle type: short-bevel   Needle gauge: 22 G  Needle length: 8 cm  Needle localization: ultrasound guidance  Needle insertion depth: 4 cm  Test dose: negative  Assessment  Injection assessment: negative aspiration for heme, no paresthesia on injection and incremental injection  Paresthesia pain: none  Heart rate change: no  Slow fractionated injection: yes  Additional Notes  Plus depomedrol 40mg.

## 2025-01-24 NOTE — PROGRESS NOTES
[facility]   Cardiothoracic Surgery   Progress Note        Jessy Garcia is a 75 y.o. female on day 2 of admission presenting with Chest pain, unspecified type.    Subjective     Interval HPI: Seen and assessed patient this morning while they were sitting up in bed; in no acute distress and without anginal complaints.     Review of Systems   Constitutional: Negative for decreased appetite and malaise/fatigue.   HENT:  Negative for congestion.    Eyes:  Negative for blurred vision and double vision.   Cardiovascular:  Negative for chest pain, dyspnea on exertion, irregular heartbeat, leg swelling, orthopnea and palpitations.   Respiratory:  Negative for cough and shortness of breath.    Endocrine: Negative for cold intolerance and heat intolerance.   Skin:  Negative for nail changes, poor wound healing and rash.   Musculoskeletal:  Negative for arthritis, muscle cramps, muscle weakness, myalgias and stiffness.   Gastrointestinal:  Negative for bloating, nausea and vomiting.   Genitourinary:  Negative for frequency, hesitancy and urgency.   Neurological:  Negative for dizziness, focal weakness, light-headedness and weakness.   Psychiatric/Behavioral:  Negative for altered mental status.    Allergic/Immunologic: Negative for environmental allergies.         Objective   Physical Exam  Physical Exam  Vitals and nursing note reviewed.   Constitutional:       General: She is not in acute distress.     Appearance: Normal appearance. She is not ill-appearing.   HENT:      Head: Normocephalic and atraumatic.      Nose: Nose normal.      Mouth/Throat:      Mouth: Mucous membranes are moist.      Pharynx: Oropharynx is clear.   Eyes:      Extraocular Movements: Extraocular movements intact.      Conjunctiva/sclera: Conjunctivae normal.      Pupils: Pupils are equal, round, and reactive to light.   Cardiovascular:      Rate and Rhythm: Normal rate and regular rhythm.      Pulses: Normal pulses.       Heart sounds: Normal heart sounds, S1 normal and S2 normal. No murmur heard.     No systolic murmur is present.      No friction rub. No gallop.   Pulmonary:      Effort: Pulmonary effort is normal.      Breath sounds: Normal breath sounds.   Abdominal:      General: Abdomen is flat. Bowel sounds are normal. There is no distension.      Palpations: Abdomen is soft.   Musculoskeletal:         General: Normal range of motion.      Cervical back: Normal range of motion and neck supple.      Right lower leg: No edema.      Left lower leg: No edema.   Skin:     General: Skin is warm and dry.      Capillary Refill: Capillary refill takes less than 2 seconds.      Findings: No lesion.      Nails: There is no clubbing.   Neurological:      General: No focal deficit present.      Mental Status: She is alert and oriented to person, place, and time. Mental status is at baseline.      Cranial Nerves: Cranial nerves 2-12 are intact.      Sensory: Sensation is intact.      Motor: Motor function is intact.   Psychiatric:         Attention and Perception: Attention and perception normal.         Mood and Affect: Mood normal.         Speech: Speech normal.         Behavior: Behavior normal.         Thought Content: Thought content normal.         Cognition and Memory: Cognition and memory normal.         Judgment: Judgment normal.         Last Recorded Vitals  Vitals:    01/23/25 1900 01/23/25 2000 01/23/25 2100 01/23/25 2200   BP:  118/64     BP Location:       Patient Position:       Pulse: 63 60 61 64   Resp:       Temp:  36.4 °C (97.5 °F)     TempSrc:  Temporal     SpO2: 94% 90% 92% (!) 89%   Weight:       Height:            Intake/Output last 3 Shifts:  I/O last 3 completed shifts:  In: 240 (2.7 mL/kg) [P.O.:240]  Out: - (0 mL/kg)   Weight: 89.7 kg     Inpatient Medications  aspirin, 81 mg, oral, Daily  gabapentin, 100 mg, oral, Nightly  levothyroxine, 50 mcg, oral, Daily  lovastatin, 40 mg, oral, Daily  metoprolol  tartrate, 50 mg, oral, BID      Continuous medications     PRN medications  PRN medications: acetaminophen **OR** acetaminophen **OR** acetaminophen, ALPRAZolam, HYDROmorphone     LDA:       Relevant Results  Lab Review  Results from last 7 days   Lab Units 01/22/25  0715   WBC AUTO x10*3/uL 7.3   HEMOGLOBIN g/dL 14.0   HEMATOCRIT % 44.4   PLATELETS AUTO x10*3/uL 265     Results from last 7 days   Lab Units 01/22/25  0715 01/20/25  0148   SODIUM mmol/L 139 138   POTASSIUM mmol/L 4.2 4.2   CHLORIDE mmol/L 106 103   CO2 mmol/L 26 27   BUN mg/dL 17 21   CREATININE mg/dL 0.80 0.86   CALCIUM mg/dL 8.9 9.9   PROTEIN TOTAL g/dL  --  8.3*   BILIRUBIN TOTAL mg/dL  --  0.2   ALK PHOS U/L  --  89   ALT U/L  --  16   AST U/L  --  17   GLUCOSE mg/dL 95 118*     Results from last 7 days   Lab Units 01/20/25  0148   MAGNESIUM mg/dL 2.12             Radiographic Testing    Echo:  Transthoracic Echo (TTE) Complete 01/20/2025  PHYSICIAN INTERPRETATION:  Left Ventricle: Left ventricular ejection fraction is normal, by visual estimate at 55-60%. There are no regional left ventricular wall motion abnormalities. The left ventricular cavity size is normal. There is mildly increased septal and mildly increased posterior left ventricular wall thickness. There is left ventricular concentric remodeling. Spectral Doppler shows a Grade I (impaired relaxation pattern) of left ventricular diastolic filling with normal left atrial filling pressure.  Left Atrium: The left atrium is normal in size.  Right Ventricle: The right ventricle is normal in size. There is normal right ventricular global systolic function.  Right Atrium: The right atrium is normal in size.  Aortic Valve: The aortic valve is trileaflet. There is mild aortic valve thickening. There is evidence of mildly elevated transaortic gradients consistent with sclerosis of the aortic valve. The aortic valve dimensionless index is 0.65. There is trace aortic valve regurgitation. The peak  instantaneous gradient of the aortic valve is 12 mmHg. The mean gradient of the aortic valve is 8 mmHg.  Mitral Valve: The mitral valve is mildly thickened. The peak instantaneous gradient of the mitral valve is 8 mmHg. There is moderate mitral valve regurgitation. The mitral regurgitant orifice area is 7 mm2. The mitral regurgitant volume is 15.75 ml.  Tricuspid Valve: The tricuspid valve is structurally normal. There is trace tricuspid regurgitation.  Pulmonic Valve: The pulmonic valve is structurally normal. There is trace pulmonic valve regurgitation.  Pericardium: There is no pericardial effusion noted.  Aorta: The aortic root is normal.  Systemic Veins: The inferior vena cava appears normal in size.  In comparison to the previous echocardiogram(s): Compared with study dated 5/16/2011,.        CONCLUSIONS:   1. Left ventricular ejection fraction is normal, by visual estimate at 55-60%.   2. Spectral Doppler shows a Grade I (impaired relaxation pattern) of left ventricular diastolic filling with normal left atrial filling pressure.   3. There is normal right ventricular global systolic function.   4. Moderate mitral valve regurgitation.   5. Aortic valve sclerosis.    Ejection Fractions:  EF   Date/Time Value Ref Range Status   01/20/2025 02:15 PM 58 %      Cath:  Cardiac Catheterization Procedure 01/21/2025  Coronary Angiography Comments:  64-year-old woman with a history of bilateral carotid endarterectomy, uncontrolled hypertension, uncontrolled hyperlipidemia presents with unstable angina. Urgent heart cath recommended.     Fluoroscopy demonstrated moderate calcification of the coronary tree.     Left main: Short vessel. No significant disease.  LAD: Large vessel. Tortuous and calcified. In the mid LAD there is a complex 80 to 90% stenosis involving a large diagonal and moderate-sized septal. The remainder the LAD has moderate disease.  Circumflex: Moderate size vessel. Proximal 80% stenosis. Remainder the  "vessel is free of disease.  RCA: Dominant vessel. Ostial 90% stenosis.  LVEDP 21 mmHg. Left atrial angiography not performed.    Imaging:  Vascular US upper extremity arterial duplex bilateral   Final Result      Vascular US lower extremity vein mapping bilateral   Final Result      Vascular US carotid artery duplex bilateral   Final Result      Vascular US palmar arch evaluation   Final Result      Cardiac Catheterization Procedure   Final Result      Transthoracic Echo (TTE) Complete   Final Result      CT angio chest for pulmonary embolism   Final Result   1.No CT evidence of pulmonary embolism.   2.Normal aorta.   3.Small pulmonary nodule in the right upper lobe measuring 0.4   cm. As an isolated phenomena likely of little significance but   correlate with any malignancy history.   4.No evidence of pulmonary artery filling defect to suggest a   pulmonary embolism.   5.No evidence of aortic dissection or aneurysmal dilatation.   6.Small distal esophageal hiatal hernia.   7.Atherosclerotic changes of the abdominal aorta and its   branches. There is some narrowing of the proximal celiac axis   estimated be approximately 50-60%.   Signed by Albino Ritchie MD      XR chest 1 view   Final Result   No acute process.   Signed by Jo Veras MD                History of Present Illness: Jessy Garcia is a 75 y.o. female with a PMH significant for HTN, HLD, hypothyroidism, carotid stenosis s/p bilateral CEA, obesity who presented to [facility] 1/20/[facility] with NSTEMI. Patient reports the pain initially began approximately 4 days ago after shoveling snow. She reports the pain radiates across her chest she describes the pain as a \"hurt\"; severity 8/10 at its worst. She does report the pain is more noticeable with minimal exertion but also states the pain occurs at rest. She does report some associated shortness of breath, she denies any nausea, vomiting, diaphoresis. She denies any " recent fevers chills, abdominal pain, headache, dizziness. She reports she is intermittently compliant with her blood pressure and cholesterol medications.      ED course: significant for HS tropnin (36), D-dimer 717 with CT PE negative for PE.     LHC performed 1/21 by Dr. Palacio and she was found to have triple vessel disease. Cardiac surgery was consulted for CABG evaluation.     Daily Events    1/22/25: No acute events overnight; currently working on staging an operative date. Continue inpatient admission given NSTEMI.      1/23/25: Continues with no acute anginal complaints overnight. Plan for patient to undergo bypass grafting tomorrow on 1/24/25 with Dr. Deyvi Gonzalez.     Assessment & Plan       Multivessel CAD   - Patient admitted with NSTMEI on 1/20/25  --> LHC on 1/21/25 revealed diffuse multivessel disease including the mid LAD, prox-Lcx and ostial RCA  --> Coronary pathology would favor bypass grafting  - OR date: 10/24/25 - AM  - Continue on ASA 81mg, metoprolol tartrate 50mg BID and lovastatin 40mg     STS ACSD:  Procedure Type: Isolated CABG    Perioperative Outcome   Estimate %  Operative Mortality   6.19%  Morbidity & Mortality   15.5%  Stroke     1.32%  Renal Failure    1.64%  Reoperation    3.69%  Prolonged Ventilation   12%  Deep Sternal Wound Infection 0.909%  Long Hospital Stay (>14 days) 5.57%  Short Hospital Stay (<6 days)  26.5%         Dyslipidemia  - Home Medications: Lovastatin 40mg   - Continued        Hypothyroidism   - Home Medications: Levothyroxine 50 mcg  - Continued     Above patient seen and plan discussed with Dr. Deyvi Gonzalez, Plan as above.     Adam Dumas, APRN-CNP

## 2025-01-24 NOTE — BRIEF OP NOTE
Date: 2025 - 2025  OR Location: [facility] OR    Name: Jessy Garcia, : 1950, Age: 75 y.o., MRN: [id number], Sex: female    Diagnosis  Pre-op Diagnosis      * NSTEMI (non-ST elevated myocardial infarction) (Multi) [I21.4] Post-op Diagnosis     * NSTEMI (non-ST elevated myocardial infarction) (Multi) [I21.4]     Procedures  CORONARY ARTERY BYPASS GRAFT x3-4  06700 - [place] CORONARY ARTERY BYP W/VEIN & ARTERY GRAFT 2 VEIN  Sternotomy  Standard central cannulation  Coronary Artery Bypass Graft x 4 (LIMA-LAD, SVG-OM, SVG-DIAG., RA-RCA)  Standard closure with wires and abundio plates    Chest Tubes/Drains:  Ricardo X 3 (right plural space, left plural space, and mediastinal)        Temporary Pacing Wires: ventricular pacing wires    -Settings:   -Underlying Rhythm:    Permanent pacer/ICD: No   -Preoperative settings:    -Intra-op/ Postoperative settings:    Sternotomy performed by: Dr. Gonzalez     Conduit Harvested by: Angel SA-C (Left radial), Mahogany SA-C (right SVG)    Sternal Wires placed by: Dr. Gonzalez     Arm/Leg/Groin Closure/Cutdown performed by: Eugeneinalhai SA-C (Left radial closure), Joseirachel SA-C (Right SVG closure)    Cardio Pulmonary Bypass Time: 90 min  Cross-clamp Time: 71 min  Circulatory Arrest: Yes Time:     Is patient candidate for Emergency Re-sternotomy? Yes   -If yes, POD #10 is -      Surgeons      * Deyvi Gonzalez - Primary    Resident/Fellow/Other Assistant:  Surgeons and Role:  * No surgeons found with a matching role *  Homer LAM; Angel SA-C; Mahogany SA-C   Staff:   Circulator: Scarlett Byers Person: Maddie  Surgical Assistant: Christo  Surgical Assistant: Roxy  Surgical Assistant: Nino Schmizt Scrub: Maribel  Relief Circulator: Maribel  Relief Circulator: Laura Schmitz Scrub: Trixie    Anesthesia Staff: Anesthesiologist: Stoney Becerra MD  C-AA: RENE Guo  Perfusionist: Scarlet Saleh    Procedure Summary  Anesthesia: General  ASA: IV  Estimated Blood Loss: 250  mL  Intra-op Medications: * Intraprocedure medication information is unavailable because the case start and end events have not been set *           Anesthesia Record               Intraprocedure I/O Totals          Intake    Dexmedetomidine 0.00 mL    The total shown is the total volume documented since Anesthesia Start was filed.    Nitroglycerin Drip 0.00 mL    The total shown is the total volume documented since Anesthesia Start was filed.    Total Intake 0 mL       Output    Urine 400 mL    Total Output 400 mL       Net    Net Volume -400 mL          Specimen: No specimens collected         Complications:  None; patient tolerated the procedure well.     Disposition: ICU - intubated and hemodynamically stable.  Condition: stable  Specimens Collected: No specimens collected  Attending Attestation: I was present and scrubbed for the key portions of the procedure.    Deyvi Gonzalez  Phone Number: [phone]

## 2025-01-24 NOTE — ANESTHESIA PROCEDURE NOTES
Central Venous Line:    Date/Time: 1/24/2025 8:14 AM    A central venous line was placed in the OR for the following indication(s): central venous access and CVP monitoring.  Staffing  Performed: attending   Authorized by: Stoney Becerra MD    Performed by: RENE Guo    Sterility preparation included the following: provider hand hygiene performed prior to central venous catheter insertion, all 5 sterile barriers used (gloves, gown, cap, mask, large sterile drape) during central venous catheter insertion, antiseptic used during central venous catheter insertion and skin prep agent completely dried prior to procedure.  Medical reason for not performing maximal sterile barrier technique: no  The patient was placed in Trendelenburg position.    Right internal jugular vein was prepped.    The site was prepped with Chlorhexidine.  Size: 9 Fr   Length: 11.5  Catheter type: introducer   Number of Lumens: triple lumen    This catheter was not an oximetric catheter.    During the procedure, the following specific steps were taken: target vein identified, needle advanced into vein and blood aspirated and guidewire advanced into vein.  Seldinger technique used.  Procedure performed using ultrasound guidance.  Sterile gel and probe cover used in ultrasound-guided central venous catheter insertion.    Intravenous verification was obtained by ultrasound, venous blood return and manometry.      Post insertion care included: all ports aspirated, all ports flushed easily, guidewire removed intact, Biopatch applied, line sutured in place and dressing applied.    During the procedure the patient experienced: patient tolerated procedure well with no complications.

## 2025-01-24 NOTE — ANESTHESIA PROCEDURE NOTES
Arterial Line:    Date/Time: 1/24/2025 7:54 AM    Staffing  Performed: RENE   Authorized by: Stoney Becerra MD    Performed by: RENE Guo    An arterial line was placed. Procedure performed using ultrasound guidance and surface landmarks.in the OR for the following indication(s): continuous blood pressure monitoring and blood sampling needed.    A 20 gauge (size), 1 and 3/4 inch (length), Arrow (type) catheter was placed into the Right radial artery, secured by tape,   Seldinger technique used.  Events:  patient tolerated procedure well with no complications.

## 2025-01-25 ENCOUNTER — APPOINTMENT (OUTPATIENT)
Dept: RADIOLOGY | Facility: HOSPITAL | Age: 75
DRG: 234 | End: 2025-01-25
Payer: MEDICARE

## 2025-01-25 ENCOUNTER — APPOINTMENT (OUTPATIENT)
Dept: CARDIOLOGY | Facility: HOSPITAL | Age: 75
DRG: 234 | End: 2025-01-25
Payer: MEDICARE

## 2025-01-25 LAB
ALBUMIN SERPL BCP-MCNC: 3.7 G/DL (ref 3.4–5)
ANION GAP BLDA CALCULATED.4IONS-SCNC: 11 MMO/L (ref 10–25)
ANION GAP BLDA CALCULATED.4IONS-SCNC: 11 MMO/L (ref 10–25)
ANION GAP SERPL CALC-SCNC: 11 MMOL/L (ref 10–20)
ANION GAP SERPL CALC-SCNC: 12 MMOL/L (ref 10–20)
APPARATUS: ABNORMAL
BASE EXCESS BLDA CALC-SCNC: -0.2 MMOL/L (ref -2–3)
BASE EXCESS BLDA CALC-SCNC: -0.7 MMOL/L (ref -2–3)
BODY TEMPERATURE: 37 DEGREES CELSIUS
BODY TEMPERATURE: 37 DEGREES CELSIUS
BUN SERPL-MCNC: 12 MG/DL (ref 6–23)
BUN SERPL-MCNC: 13 MG/DL (ref 6–23)
CA-I BLD-SCNC: 1.07 MMOL/L (ref 1.1–1.33)
CA-I BLDA-SCNC: 1.19 MMOL/L (ref 1.1–1.33)
CA-I BLDA-SCNC: 1.23 MMOL/L (ref 1.1–1.33)
CALCIUM SERPL-MCNC: 8 MG/DL (ref 8.6–10.3)
CALCIUM SERPL-MCNC: 8.4 MG/DL (ref 8.6–10.3)
CHLORIDE BLDA-SCNC: 108 MMOL/L (ref 98–107)
CHLORIDE BLDA-SCNC: 110 MMOL/L (ref 98–107)
CHLORIDE SERPL-SCNC: 106 MMOL/L (ref 98–107)
CHLORIDE SERPL-SCNC: 109 MMOL/L (ref 98–107)
CO2 SERPL-SCNC: 24 MMOL/L (ref 21–32)
CO2 SERPL-SCNC: 27 MMOL/L (ref 21–32)
CPAP: 5 CM H2O
CREAT SERPL-MCNC: 0.77 MG/DL (ref 0.5–1.05)
CREAT SERPL-MCNC: 0.99 MG/DL (ref 0.5–1.05)
EGFRCR SERPLBLD CKD-EPI 2021: 60 ML/MIN/1.73M*2
EGFRCR SERPLBLD CKD-EPI 2021: 82 ML/MIN/1.73M*2
ERYTHROCYTE [DISTWIDTH] IN BLOOD BY AUTOMATED COUNT: 13.3 % (ref 11.5–14.5)
FREQUENCY (BPM): 17 BPM
FREQUENCY (BPM): 18 BPM
GLUCOSE BLD MANUAL STRIP-MCNC: 109 MG/DL (ref 74–99)
GLUCOSE BLD MANUAL STRIP-MCNC: 129 MG/DL (ref 74–99)
GLUCOSE BLD MANUAL STRIP-MCNC: 134 MG/DL (ref 74–99)
GLUCOSE BLD MANUAL STRIP-MCNC: 141 MG/DL (ref 74–99)
GLUCOSE BLD MANUAL STRIP-MCNC: 144 MG/DL (ref 74–99)
GLUCOSE BLD MANUAL STRIP-MCNC: 154 MG/DL (ref 74–99)
GLUCOSE BLDA-MCNC: 149 MG/DL (ref 74–99)
GLUCOSE BLDA-MCNC: 149 MG/DL (ref 74–99)
GLUCOSE SERPL-MCNC: 120 MG/DL (ref 74–99)
GLUCOSE SERPL-MCNC: 154 MG/DL (ref 74–99)
HCO3 BLDA-SCNC: 22.3 MMOL/L (ref 22–26)
HCO3 BLDA-SCNC: 24.8 MMOL/L (ref 22–26)
HCT VFR BLD AUTO: 28.1 % (ref 36–46)
HCT VFR BLD EST: 28 % (ref 36–46)
HCT VFR BLD EST: 30 % (ref 36–46)
HGB BLD-MCNC: 9.6 G/DL (ref 12–16)
HGB BLDA-MCNC: 9.4 G/DL (ref 12–16)
HGB BLDA-MCNC: 9.9 G/DL (ref 12–16)
INHALED O2 CONCENTRATION: 30 %
INHALED O2 CONCENTRATION: 40 %
LACTATE BLDA-SCNC: 1.3 MMOL/L (ref 0.4–2)
LACTATE BLDA-SCNC: 1.4 MMOL/L (ref 0.4–2)
MAGNESIUM SERPL-MCNC: 2.23 MG/DL (ref 1.6–2.4)
MCH RBC QN AUTO: 29.6 PG (ref 26–34)
MCHC RBC AUTO-ENTMCNC: 34.2 G/DL (ref 32–36)
MCV RBC AUTO: 87 FL (ref 80–100)
NRBC BLD-RTO: 0 /100 WBCS (ref 0–0)
OXYHGB MFR BLDA: 93.9 % (ref 94–98)
OXYHGB MFR BLDA: 94 % (ref 94–98)
PCO2 BLDA: 30 MM HG (ref 38–42)
PCO2 BLDA: 41 MM HG (ref 38–42)
PEEP CMH2O: 5 CM H2O
PH BLDA: 7.39 PH (ref 7.38–7.42)
PH BLDA: 7.48 PH (ref 7.38–7.42)
PHOSPHATE SERPL-MCNC: 3.1 MG/DL (ref 2.5–4.9)
PLATELET # BLD AUTO: 193 X10*3/UL (ref 150–450)
PO2 BLDA: 70 MM HG (ref 85–95)
PO2 BLDA: 77 MM HG (ref 85–95)
POTASSIUM BLDA-SCNC: 3.7 MMOL/L (ref 3.5–5.3)
POTASSIUM BLDA-SCNC: 4.4 MMOL/L (ref 3.5–5.3)
POTASSIUM SERPL-SCNC: 3.9 MMOL/L (ref 3.5–5.3)
POTASSIUM SERPL-SCNC: 4 MMOL/L (ref 3.5–5.3)
PRESSURE SUPPORT: 5 CM H2O
RBC # BLD AUTO: 3.24 X10*6/UL (ref 4–5.2)
SAO2 % BLDA: 97 % (ref 94–100)
SAO2 % BLDA: 97 % (ref 94–100)
SODIUM BLDA-SCNC: 139 MMOL/L (ref 136–145)
SODIUM BLDA-SCNC: 140 MMOL/L (ref 136–145)
SODIUM SERPL-SCNC: 140 MMOL/L (ref 136–145)
SODIUM SERPL-SCNC: 141 MMOL/L (ref 136–145)
SPECIMEN DRAWN FROM PATIENT: ABNORMAL
SPECIMEN DRAWN FROM PATIENT: ABNORMAL
SPONTANEOUS TIDAL VOLUME: 324 ML
SPONTANEOUS TIDAL VOLUME: 437 ML
TIDAL VOLUME: 400 ML
TOTAL MINUTE VOLUME: 5.9 LITER
TOTAL MINUTE VOLUME: 9.2 LITER
VENTILATOR MODE: ABNORMAL
VENTILATOR MODE: ABNORMAL
VENTILATOR RATE: 20 BPM
WBC # BLD AUTO: 9.6 X10*3/UL (ref 4.4–11.3)

## 2025-01-25 PROCEDURE — 2500000004 HC RX 250 GENERAL PHARMACY W/ HCPCS (ALT 636 FOR OP/ED)

## 2025-01-25 PROCEDURE — 2500000005 HC RX 250 GENERAL PHARMACY W/O HCPCS: Performed by: NURSE PRACTITIONER

## 2025-01-25 PROCEDURE — 99233 SBSQ HOSP IP/OBS HIGH 50: CPT

## 2025-01-25 PROCEDURE — 71045 X-RAY EXAM CHEST 1 VIEW: CPT | Performed by: STUDENT IN AN ORGANIZED HEALTH CARE EDUCATION/TRAINING PROGRAM

## 2025-01-25 PROCEDURE — 84132 ASSAY OF SERUM POTASSIUM: CPT | Performed by: NURSE PRACTITIONER

## 2025-01-25 PROCEDURE — 93005 ELECTROCARDIOGRAM TRACING: CPT

## 2025-01-25 PROCEDURE — 74018 RADEX ABDOMEN 1 VIEW: CPT | Performed by: STUDENT IN AN ORGANIZED HEALTH CARE EDUCATION/TRAINING PROGRAM

## 2025-01-25 PROCEDURE — 2500000001 HC RX 250 WO HCPCS SELF ADMINISTERED DRUGS (ALT 637 FOR MEDICARE OP): Performed by: NURSE PRACTITIONER

## 2025-01-25 PROCEDURE — 2020000001 HC ICU ROOM DAILY

## 2025-01-25 PROCEDURE — 99291 CRITICAL CARE FIRST HOUR: CPT

## 2025-01-25 PROCEDURE — 2500000004 HC RX 250 GENERAL PHARMACY W/ HCPCS (ALT 636 FOR OP/ED): Performed by: NURSE PRACTITIONER

## 2025-01-25 PROCEDURE — 82947 ASSAY GLUCOSE BLOOD QUANT: CPT

## 2025-01-25 PROCEDURE — 85027 COMPLETE CBC AUTOMATED: CPT | Performed by: NURSE PRACTITIONER

## 2025-01-25 PROCEDURE — 71045 X-RAY EXAM CHEST 1 VIEW: CPT

## 2025-01-25 PROCEDURE — 74018 RADEX ABDOMEN 1 VIEW: CPT

## 2025-01-25 PROCEDURE — 74018 RADEX ABDOMEN 1 VIEW: CPT | Performed by: RADIOLOGY

## 2025-01-25 PROCEDURE — 94003 VENT MGMT INPAT SUBQ DAY: CPT

## 2025-01-25 PROCEDURE — P9045 ALBUMIN (HUMAN), 5%, 250 ML: HCPCS | Mod: JZ

## 2025-01-25 PROCEDURE — 2500000002 HC RX 250 W HCPCS SELF ADMINISTERED DRUGS (ALT 637 FOR MEDICARE OP, ALT 636 FOR OP/ED): Performed by: NURSE PRACTITIONER

## 2025-01-25 PROCEDURE — 84132 ASSAY OF SERUM POTASSIUM: CPT

## 2025-01-25 PROCEDURE — 37799 UNLISTED PX VASCULAR SURGERY: CPT | Performed by: NURSE PRACTITIONER

## 2025-01-25 PROCEDURE — 37799 UNLISTED PX VASCULAR SURGERY: CPT

## 2025-01-25 PROCEDURE — 84132 ASSAY OF SERUM POTASSIUM: CPT | Performed by: INTERNAL MEDICINE

## 2025-01-25 PROCEDURE — 80069 RENAL FUNCTION PANEL: CPT | Performed by: NURSE PRACTITIONER

## 2025-01-25 PROCEDURE — 2500000001 HC RX 250 WO HCPCS SELF ADMINISTERED DRUGS (ALT 637 FOR MEDICARE OP)

## 2025-01-25 PROCEDURE — 83735 ASSAY OF MAGNESIUM: CPT | Performed by: NURSE PRACTITIONER

## 2025-01-25 PROCEDURE — 82330 ASSAY OF CALCIUM: CPT | Performed by: NURSE PRACTITIONER

## 2025-01-25 PROCEDURE — 2500000005 HC RX 250 GENERAL PHARMACY W/O HCPCS: Performed by: THORACIC SURGERY (CARDIOTHORACIC VASCULAR SURGERY)

## 2025-01-25 RX ORDER — METOPROLOL TARTRATE 25 MG/1
12.5 TABLET, FILM COATED ORAL 2 TIMES DAILY
Status: DISCONTINUED | OUTPATIENT
Start: 2025-01-25 | End: 2025-01-29 | Stop reason: HOSPADM

## 2025-01-25 RX ORDER — FUROSEMIDE 10 MG/ML
40 INJECTION INTRAMUSCULAR; INTRAVENOUS ONCE
Status: COMPLETED | OUTPATIENT
Start: 2025-01-25 | End: 2025-01-25

## 2025-01-25 RX ORDER — TALC
6 POWDER (GRAM) TOPICAL NIGHTLY PRN
Status: DISCONTINUED | OUTPATIENT
Start: 2025-01-25 | End: 2025-01-29 | Stop reason: HOSPADM

## 2025-01-25 RX ORDER — FUROSEMIDE 10 MG/ML
20 INJECTION INTRAMUSCULAR; INTRAVENOUS ONCE
Status: DISCONTINUED | OUTPATIENT
Start: 2025-01-25 | End: 2025-01-25

## 2025-01-25 RX ORDER — ALBUMIN HUMAN 50 G/1000ML
12.5 SOLUTION INTRAVENOUS AS NEEDED
Status: COMPLETED | OUTPATIENT
Start: 2025-01-25 | End: 2025-01-25

## 2025-01-25 RX ORDER — POLYETHYLENE GLYCOL 3350 17 G/17G
17 POWDER, FOR SOLUTION ORAL DAILY
Status: DISCONTINUED | OUTPATIENT
Start: 2025-01-25 | End: 2025-01-29 | Stop reason: HOSPADM

## 2025-01-25 RX ORDER — FUROSEMIDE 10 MG/ML
10 INJECTION INTRAMUSCULAR; INTRAVENOUS DAILY
Status: DISCONTINUED | OUTPATIENT
Start: 2025-01-25 | End: 2025-01-26

## 2025-01-25 RX ADMIN — Medication 6 L/MIN: at 07:58

## 2025-01-25 RX ADMIN — INSULIN LISPRO 5 UNITS: 100 INJECTION, SOLUTION INTRAVENOUS; SUBCUTANEOUS at 02:36

## 2025-01-25 RX ADMIN — ALBUMIN HUMAN 12.5 G: 0.05 INJECTION, SOLUTION INTRAVENOUS at 12:45

## 2025-01-25 RX ADMIN — POTASSIUM CHLORIDE 20 MEQ: 14.9 INJECTION, SOLUTION INTRAVENOUS at 05:21

## 2025-01-25 RX ADMIN — SENNOSIDES AND DOCUSATE SODIUM 2 TABLET: 50; 8.6 TABLET ORAL at 09:24

## 2025-01-25 RX ADMIN — MUPIROCIN 1 APPLICATION: 20 OINTMENT TOPICAL at 21:11

## 2025-01-25 RX ADMIN — GABAPENTIN 100 MG: 100 CAPSULE ORAL at 21:11

## 2025-01-25 RX ADMIN — METHOCARBAMOL 500 MG: 500 TABLET ORAL at 21:11

## 2025-01-25 RX ADMIN — ACETAMINOPHEN 650 MG: 325 TABLET, FILM COATED ORAL at 09:24

## 2025-01-25 RX ADMIN — FUROSEMIDE 10 MG: 10 INJECTION, SOLUTION INTRAMUSCULAR; INTRAVENOUS at 14:07

## 2025-01-25 RX ADMIN — ACETAMINOPHEN 650 MG: 325 TABLET, FILM COATED ORAL at 14:07

## 2025-01-25 RX ADMIN — METHOCARBAMOL 500 MG: 500 TABLET ORAL at 14:10

## 2025-01-25 RX ADMIN — CEFAZOLIN SODIUM 2 G: 2 INJECTION, SOLUTION INTRAVENOUS at 14:10

## 2025-01-25 RX ADMIN — OXYCODONE HYDROCHLORIDE 10 MG: 5 TABLET ORAL at 17:50

## 2025-01-25 RX ADMIN — CALCIUM CHLORIDE 500 MG: 100 INJECTION, SOLUTION INTRAVENOUS at 05:07

## 2025-01-25 RX ADMIN — FUROSEMIDE 40 MG: 10 INJECTION, SOLUTION INTRAMUSCULAR; INTRAVENOUS at 17:23

## 2025-01-25 RX ADMIN — CEFAZOLIN SODIUM 2 G: 2 INJECTION, SOLUTION INTRAVENOUS at 21:11

## 2025-01-25 RX ADMIN — METOPROLOL TARTRATE 12.5 MG: 25 TABLET, FILM COATED ORAL at 21:11

## 2025-01-25 RX ADMIN — MAGNESIUM OXIDE TAB 400 MG (241.3 MG ELEMENTAL MG) 400 MG: 400 (241.3 MG) TAB at 09:24

## 2025-01-25 RX ADMIN — ACETAMINOPHEN 650 MG: 325 TABLET, FILM COATED ORAL at 21:11

## 2025-01-25 RX ADMIN — GABAPENTIN 100 MG: 100 CAPSULE ORAL at 14:07

## 2025-01-25 RX ADMIN — GABAPENTIN 100 MG: 100 CAPSULE ORAL at 09:24

## 2025-01-25 RX ADMIN — OXYCODONE HYDROCHLORIDE 10 MG: 5 TABLET ORAL at 13:48

## 2025-01-25 RX ADMIN — MUPIROCIN 1 APPLICATION: 20 OINTMENT TOPICAL at 09:24

## 2025-01-25 RX ADMIN — LEVOTHYROXINE SODIUM 50 MCG: 0.05 TABLET ORAL at 09:24

## 2025-01-25 RX ADMIN — INSULIN LISPRO 5 UNITS: 100 INJECTION, SOLUTION INTRAVENOUS; SUBCUTANEOUS at 06:13

## 2025-01-25 RX ADMIN — ASPIRIN 81 MG CHEWABLE TABLET 81 MG: 81 TABLET CHEWABLE at 09:28

## 2025-01-25 RX ADMIN — SENNOSIDES AND DOCUSATE SODIUM 2 TABLET: 50; 8.6 TABLET ORAL at 21:11

## 2025-01-25 RX ADMIN — POLYETHYLENE GLYCOL 3350 17 G: 17 POWDER, FOR SOLUTION ORAL at 09:24

## 2025-01-25 RX ADMIN — SODIUM CHLORIDE, POTASSIUM CHLORIDE, SODIUM LACTATE AND CALCIUM CHLORIDE 500 ML: 600; 310; 30; 20 INJECTION, SOLUTION INTRAVENOUS at 17:32

## 2025-01-25 RX ADMIN — LIDOCAINE 4% 1 PATCH: 40 PATCH TOPICAL at 14:07

## 2025-01-25 RX ADMIN — SODIUM CHLORIDE, POTASSIUM CHLORIDE, SODIUM LACTATE AND CALCIUM CHLORIDE 500 ML: 600; 310; 30; 20 INJECTION, SOLUTION INTRAVENOUS at 15:31

## 2025-01-25 RX ADMIN — LOVASTATIN 40 MG: 40 TABLET ORAL at 09:28

## 2025-01-25 RX ADMIN — ENOXAPARIN SODIUM 40 MG: 40 INJECTION SUBCUTANEOUS at 09:24

## 2025-01-25 RX ADMIN — PANTOPRAZOLE SODIUM 40 MG: 40 INJECTION, POWDER, FOR SOLUTION INTRAVENOUS at 06:12

## 2025-01-25 RX ADMIN — CEFAZOLIN SODIUM 2 G: 2 INJECTION, SOLUTION INTRAVENOUS at 05:13

## 2025-01-25 RX ADMIN — Medication 30 PERCENT: at 06:43

## 2025-01-25 RX ADMIN — ALBUMIN HUMAN 12.5 G: 0.05 INJECTION, SOLUTION INTRAVENOUS at 10:24

## 2025-01-25 ASSESSMENT — COGNITIVE AND FUNCTIONAL STATUS - GENERAL
PERSONAL GROOMING: A LOT
MOVING TO AND FROM BED TO CHAIR: A LOT
DRESSING REGULAR UPPER BODY CLOTHING: A LOT
HELP NEEDED FOR BATHING: A LOT
TOILETING: A LOT
MOVING FROM LYING ON BACK TO SITTING ON SIDE OF FLAT BED WITH BEDRAILS: A LOT
MOBILITY SCORE: 12
DRESSING REGULAR LOWER BODY CLOTHING: A LOT
CLIMB 3 TO 5 STEPS WITH RAILING: A LOT
STANDING UP FROM CHAIR USING ARMS: A LOT
WALKING IN HOSPITAL ROOM: A LOT
DAILY ACTIVITIY SCORE: 13
TURNING FROM BACK TO SIDE WHILE IN FLAT BAD: A LOT
EATING MEALS: A LITTLE

## 2025-01-25 ASSESSMENT — PAIN SCALES - GENERAL
PAINLEVEL_OUTOF10: 7
PAINLEVEL_OUTOF10: 0 - NO PAIN
PAINLEVEL_OUTOF10: 4

## 2025-01-25 ASSESSMENT — ENCOUNTER SYMPTOMS
NEUROLOGICAL NEGATIVE: 1
PSYCHIATRIC NEGATIVE: 1
SHORTNESS OF BREATH: 0
HEMATOLOGIC/LYMPHATIC NEGATIVE: 1
WEAKNESS: 0
GASTROINTESTINAL NEGATIVE: 1
NUMBNESS: 0
COUGH: 0
CONSTITUTIONAL NEGATIVE: 1
WHEEZING: 0
PARESTHESIAS: 0
IRREGULAR HEARTBEAT: 0
ENDOCRINE NEGATIVE: 1
EYES NEGATIVE: 1
MUSCULOSKELETAL NEGATIVE: 1

## 2025-01-25 ASSESSMENT — PAIN - FUNCTIONAL ASSESSMENT
PAIN_FUNCTIONAL_ASSESSMENT: 0-10
PAIN_FUNCTIONAL_ASSESSMENT: 0-10

## 2025-01-25 NOTE — PROGRESS NOTES
Subjective   Jessy Garcia is a 75 y.o. female who presents with Hypertension and chest discomfort.    Patient extubated this morning. Doing well. Will have bedside swallow and advance diet.    Objective   Vitals:    01/25/25 1600   BP: 91/50   Pulse: 67   Resp: 15   Temp: 36.6 °C (97.9 °F)   SpO2: 96%      Physical Exam  Physical Exam:  Constitutional: well developed, awake, alert, no acute distress  ENMT: mucous membranes moist, EOMI, conjunctivae clear  Head/Neck: normocephalic, atraumatic; supple, trachea midline  Respiratory/Thorax: patent airways, CTAB; no wheezes, rales, or rhonchi  Cardiovascular: RRR, no murmur  Gastrointestinal: soft, nondistended, non-tender, bowel sounds appreciated  Extremities: palpable peripheral pulses, no edema or cyanosis  Neurological: AO x3, no focal deficits  Psychological: appropriate mood and behavior  Skin: warm and dry    Assessment/Plan       Jessy Garcia is a 75 year old female with PMH of HTN, HLD, hypothyroidism, carotid stenosis s/p bilateral CEA, obesity who presented to [facility] 1/20/2025 with NSTEMI. LHC performed 1/21 by Dr. Palacio and was found to have triple vessel disease. S/p coronary artery bypass x 4 (LIMA to LAD, Radial artery to RCA, SVG to OM, SVG to diagonal 2).     Neuro:  - Patient orientated to person place and time  - Analgesia: Gabapentin, PRN Tylenol, Oxycodone, Fentanyl per CTS  - Maintain normal sleep/wake cycle  - Avoid oversedating patient     Cardiovascular  #s/p CABG x 4 (LIMA to LAD, Radial artery to RCA, SVG to OM, SVG to diagonal 2)  #Hx HTN  #Hx HLD  - Last TTE 1/20/25 showed EF 55-60%  - Patient with postop hypotension after CABG surgery, improved  - Maintain MAP >70, wean as tolerated.  - On asa, lovastatin, metoprolol 12.5 mg started  - Diuresis per CTS. Keep Mg>2, K>4.     Pulmonary:  - Patient with postop respiratory insufficiency  - Patient extubated 1/25/25, on 6L NC, maintain O2 sats>92%.  - Chest tube management per CTS  -  CXR shows post CABG changes, central lines, clips. Atelectasis.  - Encourage incentive spirometer use q1  - Daily CXR  - Given lasix 20mg once 1/25     GI:  - Diet: NPO advance per CTS  - Prophylaxis: PPI     Renal:   - No current issues  - Nava in place, strict I&O's  - Trend renal panel, replete electrolytes PRN     Endocrine:  #Hypothyroidism  - Continue home levothyroxine  - SSI, maintain BG between 140-180s     Heme/Onc:  - DVT PPX: Lovenox  - Postop anemia as expected  - Trend H&H     ID:  - Postop leukocytosis most likely reactive, as expected  - Prophylactic antibiotics per CTS  - CXR no acute pulmonary process     Skin/MSK:  - No acute issues     ICU CHECK LIST:   Antimicrobials: ancef per CTS  Oxygen: 6L NC  Feeding: advance per CTS  Fluids: 500cc bolus  Analgesia:   Sedation:   Thromboprophylaxis: Lovenox  Ulcer prophylaxis: PPI  Glycemic control: SSI  Bowel care: polly-colase, metamucil  Indwelling catheters: chest tubes x3, nava catheter  Lines: R HAILEY, DAMARIS Triana, ZACs     Code Status: FULL     This is a preliminary note written by the resident. Please wait for attending addendum for finalization of note and recommendations.     Adithya Otoole DO, PhD  Internal Medicine PGY2

## 2025-01-25 NOTE — PROGRESS NOTES
[facility]   Cardiothoracic Surgery   Progress Note        Jessy Garcia is a 75 y.o. female on day 4 of admission presenting with Chest pain, unspecified type.    Subjective     Interval HPI: Seen and assessed patient this morning, s/p CABG x4. Patient was extubated to 6L NC this morning. She is resting comfortably in bed in no acute distress. Plan to get OOB to chair and remove chest tubes later.       Review of Systems   Constitutional: Negative.   HENT: Negative.     Eyes: Negative.    Cardiovascular:  Negative for chest pain and irregular heartbeat.   Respiratory:  Negative for cough, shortness of breath and wheezing.    Endocrine: Negative.    Hematologic/Lymphatic: Negative.    Skin: Negative.    Musculoskeletal: Negative.    Gastrointestinal: Negative.    Genitourinary: Negative.    Neurological: Negative.  Negative for numbness, paresthesias and weakness.   Psychiatric/Behavioral: Negative.           Objective   Physical Exam  Physical Exam  Vitals and nursing note reviewed.   Constitutional:       Appearance: Normal appearance.   HENT:      Head: Normocephalic and atraumatic.   Eyes:      Extraocular Movements: Extraocular movements intact.      Pupils: Pupils are equal, round, and reactive to light.   Cardiovascular:      Rate and Rhythm: Normal rate and regular rhythm.      Pulses: Normal pulses.      Heart sounds: Normal heart sounds.   Pulmonary:      Effort: Pulmonary effort is normal.      Breath sounds: Normal breath sounds.      Comments: Diminished bases bibasilar  Abdominal:      Palpations: Abdomen is soft.      Comments: Hypoactive bowel sounds   Musculoskeletal:         General: Normal range of motion.      Cervical back: Normal range of motion.   Skin:     General: Skin is warm and dry.      Capillary Refill: Capillary refill takes 2 to 3 seconds.      Comments: Midsternal incision dressing CDI   Neurological:      General: No focal deficit present.       Mental Status: She is alert and oriented to person, place, and time. Mental status is at baseline.      Cranial Nerves: No cranial nerve deficit.      Sensory: No sensory deficit.   Psychiatric:         Mood and Affect: Mood normal.         Behavior: Behavior normal.         Last Recorded Vitals  Vitals:    01/25/25 1015 01/25/25 1030 01/25/25 1045 01/25/25 1100   BP: 86/51 86/53 90/51    BP Location:       Patient Position:       Pulse: 59 61 65 63   Resp: 18 24 24 21   Temp:       TempSrc:       SpO2: 98% 99% 100%    Weight:       Height:            Intake/Output last 3 Shifts:  I/O last 3 completed shifts:  In: 2287.8 (26.4 mL/kg) [I.V.:212.8 (2.5 mL/kg); Blood:1075; IV Piggyback:1000]  Out: 2410 (27.8 mL/kg) [Urine:1935 (0.6 mL/kg/hr); Chest Tube:475]  Weight: 86.6 kg     Inpatient Medications  acetaminophen, 650 mg, oral, q6h  aspirin, 81 mg, oral, Daily  ceFAZolin, 2 g, intravenous, q8h  enoxaparin, 40 mg, subcutaneous, Daily  gabapentin, 100 mg, oral, TID  insulin lispro, 0-15 Units, subcutaneous, q4h  levothyroxine, 50 mcg, oral, Daily  lidocaine, 1 patch, transdermal, q24h  lovastatin, 40 mg, oral, Daily  magnesium oxide, 400 mg, oral, Daily  methocarbamol, 500 mg, oral, q8h CHRIS  mupirocin, 1 Application, Each Nostril, BID  oxygen, , inhalation, Continuous - Inhalation  pantoprazole, 40 mg, oral, Daily before breakfast   Or  pantoprazole, 40 mg, intravenous, Daily before breakfast  perflutren lipid microspheres, 0.5-10 mL of dilution, intravenous, Once in imaging  perflutren protein A microsphere, 0.5 mL, intravenous, Once in imaging  polyethylene glycol, 17 g, oral, Daily  sennosides-docusate sodium, 2 tablet, oral, BID  sulfur hexafluoride microsphr, 2 mL, intravenous, Once in imaging      Continuous medications  EPINEPHrine, 0-2 mcg/kg/min  norepinephrine, 0-3 mcg/kg/min, Last Rate: 0.03 mcg/kg/min (01/25/25 0639)      PRN medications  PRN medications: albumin human, alteplase, calcium chloride, calcium  chloride, fentaNYL, magnesium sulfate, magnesium sulfate, metoclopramide **OR** metoclopramide, naloxone, ondansetron ODT **OR** ondansetron, oxyCODONE, oxyCODONE, potassium chloride CR **OR** potassium chloride, potassium chloride CR **OR** potassium chloride, potassium chloride, potassium chloride, sodium chloride     LDA:       Relevant Results  Lab Review  Results from last 7 days   Lab Units 01/25/25  0420   WBC AUTO x10*3/uL 9.6   HEMOGLOBIN g/dL 9.6*   HEMATOCRIT % 28.1*   PLATELETS AUTO x10*3/uL 193     Results from last 7 days   Lab Units 01/25/25  0420 01/22/25  0715 01/20/25  0148   SODIUM mmol/L 141   < > 138   POTASSIUM mmol/L 3.9   < > 4.2   CHLORIDE mmol/L 109*   < > 103   CO2 mmol/L 24   < > 27   BUN mg/dL 12   < > 21   CREATININE mg/dL 0.77   < > 0.86   CALCIUM mg/dL 8.0*   < > 9.9   PROTEIN TOTAL g/dL  --   --  8.3*   BILIRUBIN TOTAL mg/dL  --   --  0.2   ALK PHOS U/L  --   --  89   ALT U/L  --   --  16   AST U/L  --   --  17   GLUCOSE mg/dL 154*   < > 118*    < > = values in this interval not displayed.     Results from last 7 days   Lab Units 01/25/25  0420   MAGNESIUM mg/dL 2.23     Results from last 7 days   Lab Units 01/25/25  0712   POCT PH, ARTERIAL pH 7.39   POCT PCO2, ARTERIAL mm Hg 41   POCT PO2, ARTERIAL mm Hg 77*   POCT HCO3 CALCULATED, ARTERIAL mmol/L 24.8   POCT BASE EXCESS, ARTERIAL mmol/L -0.2         Radiographic Testing    Echo:  Transthoracic Echo (TTE) Complete 01/20/2025  PHYSICIAN INTERPRETATION:  Left Ventricle: Left ventricular ejection fraction is normal, by visual estimate at 55-60%. There are no regional left ventricular wall motion abnormalities. The left ventricular cavity size is normal. There is mildly increased septal and mildly increased posterior left ventricular wall thickness. There is left ventricular concentric remodeling. Spectral Doppler shows a Grade I (impaired relaxation pattern) of left ventricular diastolic filling with normal left atrial filling  pressure.  Left Atrium: The left atrium is normal in size.  Right Ventricle: The right ventricle is normal in size. There is normal right ventricular global systolic function.  Right Atrium: The right atrium is normal in size.  Aortic Valve: The aortic valve is trileaflet. There is mild aortic valve thickening. There is evidence of mildly elevated transaortic gradients consistent with sclerosis of the aortic valve. The aortic valve dimensionless index is 0.65. There is trace aortic valve regurgitation. The peak instantaneous gradient of the aortic valve is 12 mmHg. The mean gradient of the aortic valve is 8 mmHg.  Mitral Valve: The mitral valve is mildly thickened. The peak instantaneous gradient of the mitral valve is 8 mmHg. There is moderate mitral valve regurgitation. The mitral regurgitant orifice area is 7 mm2. The mitral regurgitant volume is 15.75 ml.  Tricuspid Valve: The tricuspid valve is structurally normal. There is trace tricuspid regurgitation.  Pulmonic Valve: The pulmonic valve is structurally normal. There is trace pulmonic valve regurgitation.  Pericardium: There is no pericardial effusion noted.  Aorta: The aortic root is normal.  Systemic Veins: The inferior vena cava appears normal in size.  In comparison to the previous echocardiogram(s): Compared with study dated 5/16/2011,.        CONCLUSIONS:   1. Left ventricular ejection fraction is normal, by visual estimate at 55-60%.   2. Spectral Doppler shows a Grade I (impaired relaxation pattern) of left ventricular diastolic filling with normal left atrial filling pressure.   3. There is normal right ventricular global systolic function.   4. Moderate mitral valve regurgitation.   5. Aortic valve sclerosis.    Ejection Fractions:  EF   Date/Time Value Ref Range Status   01/20/2025 02:15 PM 58 %      Cath:  Cardiac Catheterization Procedure 01/21/2025  Coronary Angiography Comments:  64-year-old woman with a history of bilateral carotid  endarterectomy, uncontrolled hypertension, uncontrolled hyperlipidemia presents with unstable angina. Urgent heart cath recommended.     Fluoroscopy demonstrated moderate calcification of the coronary tree.     Left main: Short vessel. No significant disease.  LAD: Large vessel. Tortuous and calcified. In the mid LAD there is a complex 80 to 90% stenosis involving a large diagonal and moderate-sized septal. The remainder the LAD has moderate disease.  Circumflex: Moderate size vessel. Proximal 80% stenosis. Remainder the vessel is free of disease.  RCA: Dominant vessel. Ostial 90% stenosis.  LVEDP 21 mmHg. Left atrial angiography not performed.    Imaging:  XR abdomen 1 view   Final Result   1. Gas distended stomach, otherwise nonobstructive gaseous pattern.        MACRO:   None        Signed by: Rony Powell 1/25/2025 7:01 AM   Dictation workstation:   [id number]      XR chest 1 view   Final Result   1. No signal interval change.        Signed by: Rony Powell 1/25/2025 6:28 AM   Dictation workstation:   [id number]      XR chest 1 view   Final Result   1.  Postsurgical changes with medical devices as detailed. Possible   left pleural effusion and bilateral lower lobe airspace opacities   including a left retrocardiac opacity which could be related to   atelectasis or infection, similar to prior imaging.                  MACRO:   None        Signed by: Mariposa Garcia 1/24/2025 11:16 PM   Dictation workstation:   [id number]      XR chest 1 view   Final Result   1. Postoperative changes, as above.   2. Diffuse interstitial infiltrates bilaterally, as above. Clinical   correlation and continued follow-up until clearing is recommended.        MACRO:   None.        Signed by: Gerardo Oneill 1/24/2025 3:25 PM   Dictation workstation:   [id number]      Vascular US upper extremity arterial duplex bilateral   Final Result      Vascular US lower extremity vein mapping bilateral   Final Result      Vascular US carotid  "artery duplex bilateral   Final Result      Vascular US palmar arch evaluation   Final Result      Cardiac Catheterization Procedure   Final Result      Transthoracic Echo (TTE) Complete   Final Result      CT angio chest for pulmonary embolism   Final Result   1.No CT evidence of pulmonary embolism.   2.Normal aorta.   3.Small pulmonary nodule in the right upper lobe measuring 0.4   cm. As an isolated phenomena likely of little significance but   correlate with any malignancy history.   4.No evidence of pulmonary artery filling defect to suggest a   pulmonary embolism.   5.No evidence of aortic dissection or aneurysmal dilatation.   6.Small distal esophageal hiatal hernia.   7.Atherosclerotic changes of the abdominal aorta and its   branches. There is some narrowing of the proximal celiac axis   estimated be approximately 50-60%.   Signed by Albino Ritchie MD      XR chest 1 view   Final Result   No acute process.   Signed by Jo Veras MD      Transesophageal Echo (BAKARI)    (Results Pending)   XR chest 1 view    (Results Pending)             History of Present Illness: Jessy Garcia is a 75 y.o. female with a PMH significant for HTN, HLD, hypothyroidism, carotid stenosis s/p bilateral CEA, obesity who presented to [facility] on 1/20/2025 with NSTEMI. Patient reports the pain initially began approximately 4 days ago after shoveling snow. She reports the pain radiates across her chest she describes the pain as a \"hurt\"; severity 8/10 at its worst. She does report the pain is more noticeable with minimal exertion but also states the pain occurs at rest. She does report some associated shortness of breath, she denies any nausea, vomiting, diaphoresis. She denies any recent fevers chills, abdominal pain, headache, dizziness. She reports she is intermittently compliant with her blood pressure and cholesterol medications.      ED course: significant for HS tropnin (36), D-dimer 717 " with CT PE negative for PE.     [facility] performed 1/21 by Dr. Palacio and she was found to have triple vessel disease. Cardiac surgery was consulted for CABG evaluation.     Daily Events    1/22/25: No acute events overnight; currently working on staging an operative date. Continue inpatient admission given NSTEMI.      1/23/25: Continues with no acute anginal complaints overnight. Plan for patient to undergo bypass grafting tomorrow on 1/24/25 with Dr. Deyvi Gonzalez.     1/24/25: No acute events overnight; plan for OR today with Dr. Deyvi Gonzalez.     1/24/25 (Post-op): Patient arrived to the ICU in critically ill but stable condition. Currently intubate & sedated without need for vasopressors/dilators. Plan to reverse paralytic therapy and wean mechanical ventilation towards extubation.   - Vital Signs: HR 63, /59 (82), SpO2 94%   - Hemodynamics: CVP 9, CO 3.8, CI 2.1, SVR 1367  - Ventilator Settings: PRVC-AC / FiO2 50% / PEEP 5 / RR 16   - Pump time: 90 minutes   Intake & Output:   - Initial R Pleural Chest Tube: 140mL  - Initial L Pleural Chest Tube: 20mL  - Initial Mediastinal Chest Tube: 40mL    1/25/2025:  Patient is POD#1 s/p CABGx4. She was extubated this morning to 6L NC. Currently remains on 0.02 of levo, will give albumin in hopes to wean off vasoactive gtts. L pleural chest tube: 50mL, R pleural chest tube: 330mL, Mediastinal chest tube: 95mL. Will get oob and into chair this morning. Likely will remove all chest tubes later today after ambulation.     Assessment & Plan         Multivessel Coronary Artery Disease  - Patient admitted with NSTMEI on 1/20/25  --> [facility] on 1/21/25 revealed diffuse multivessel disease including the mid LAD, prox-Lcx and ostial RCA  - S/p CABG x 4 on 1/24/25 with Dr. Deyvi Gonzalez   --> LIMA-LAD, SVG-Diag, SVG-OM, RA-RCA  - Continue on ASA 81mg and Lovastatin 40mg   - Holding beta-blocker in initial post op period  - After OOB/ambulation, discontinue all chest tubes per   Gonzalez  - 2 View CXR prior to D/C       Radial Grafting  - S/p radial harvest site  - Consider addition of nitrates as indicated        Mediastinal Incision  - Remove post-op dressing on POD #2  - Dressing C/D/I       Acute Post-Op Pain  - As expected   - Multimodal pain control   --> Scheduled: Acetaminophen, magnesium oxide 400mg QD, gabapentin 100mg TID, methocarbamol 500mg q6h   --> PRN: oxycodone & fentanyl  - Bowel regimen while taking narcotics        Risk for Post-Op Arrhythmia  - Ventricular wires placed  - Discontinue V-wires prior to DC  - Telemetry until discharged       Acute Postoperative Respiratory Insufficiency   - As expected following CABG with post-op atelectasis  - Currently O/V well on 6L NC  - Coughing and deep breathing exercises with Incentive spirometry  - Out of bed, early and aggressive mobilization with assistance  - Oxygen as needed, wean to keep saturation above 92%  - ICU intensivist/pulmonologist consulted  - Albuterol nebulizers as needed       Risk for Fluid Volume Overload  - Pre-Op Weight: 88.6 kg    - 1/25: 93kg --> Plan to start low dose diuresis today  - Strict I& Os and daily weights    - Monitor hemodynamics        Acute Post-Op Blood-Loss Anemia  - Pre-Op H/H:  14/44.4  --> Immediate post-op: 9.9/29.8   - 1/25: 9.6/28.1  - Monitor h/h in the initial post op period  - Monitor chest tubes for post op hemorrhage   - Multivitamin/iron tablet   - Daily CBC while in hospital       Post-Op Leukocytosis  - Pre-Op WBC: 7.3  --> Immediate post-op: 14.5   - 1/25: 9.6  - Afebrile, consider elevations as reactive to surgery   - Post-op ATB Q12 for 48hrs  - Daily cbc while in hospital        Dyslipidemia  - Home Medications: Lovastatin 40mg   - Continued        Hypothyroidism   - Home Medications: Levothyroxine 50 mcg  - Continued        Risk for Post-op Hyperglycemia  - Home Medications: None  - HbA1c: 5.8  - Goal for BG <150 in the post-operative period  - SSI       Risk for  Electrolyte Disturbances  - Optimize electrolytes per [facility] protocol      Bowel Regimen  - Senna-S BID, miralax daily  - PRN suppositories     Prophylaxis  - GI: PPI  - DVT: Duy-Hose & enoxaparin   - MRSA: Pending (1/24)  - PT/OT     Disposition  - CVICU    Above patient seen and plan discussed with Dr. Deyvi Gonzalez, Plan as above.     Marilyn Huerta, PATSY-CNP      Critical Care Billing Statement    I have reviewed and evaluated the most recent data and results, personally examined the patient, and formulated the plan of care as presented above. This patient was critically ill and required continued critical care treatment. Teaching and any separately billable procedures are not included in the time calculation.    Billing Provider Critical Care Time: 55 minutes

## 2025-01-26 ENCOUNTER — APPOINTMENT (OUTPATIENT)
Dept: RADIOLOGY | Facility: HOSPITAL | Age: 75
DRG: 234 | End: 2025-01-26
Payer: MEDICARE

## 2025-01-26 LAB
ALBUMIN SERPL BCP-MCNC: 3.8 G/DL (ref 3.4–5)
ANION GAP SERPL CALC-SCNC: 9 MMOL/L (ref 10–20)
BLOOD EXPIRATION DATE: NORMAL
BLOOD EXPIRATION DATE: NORMAL
BUN SERPL-MCNC: 13 MG/DL (ref 6–23)
CA-I BLD-SCNC: 1.1 MMOL/L (ref 1.1–1.33)
CALCIUM SERPL-MCNC: 8.3 MG/DL (ref 8.6–10.3)
CHLORIDE SERPL-SCNC: 102 MMOL/L (ref 98–107)
CO2 SERPL-SCNC: 29 MMOL/L (ref 21–32)
CREAT SERPL-MCNC: 0.92 MG/DL (ref 0.5–1.05)
DISPENSE STATUS: NORMAL
DISPENSE STATUS: NORMAL
EGFRCR SERPLBLD CKD-EPI 2021: 66 ML/MIN/1.73M*2
EJECTION FRACTION: 58 %
ERYTHROCYTE [DISTWIDTH] IN BLOOD BY AUTOMATED COUNT: 14.2 % (ref 11.5–14.5)
GLUCOSE BLD MANUAL STRIP-MCNC: 109 MG/DL (ref 74–99)
GLUCOSE BLD MANUAL STRIP-MCNC: 113 MG/DL (ref 74–99)
GLUCOSE BLD MANUAL STRIP-MCNC: 115 MG/DL (ref 74–99)
GLUCOSE BLD MANUAL STRIP-MCNC: 116 MG/DL (ref 74–99)
GLUCOSE BLD MANUAL STRIP-MCNC: 133 MG/DL (ref 74–99)
GLUCOSE SERPL-MCNC: 123 MG/DL (ref 74–99)
HCT VFR BLD AUTO: 25.2 % (ref 36–46)
HGB BLD-MCNC: 8.1 G/DL (ref 12–16)
MAGNESIUM SERPL-MCNC: 1.87 MG/DL (ref 1.6–2.4)
MCH RBC QN AUTO: 29.3 PG (ref 26–34)
MCHC RBC AUTO-ENTMCNC: 32.1 G/DL (ref 32–36)
MCV RBC AUTO: 91 FL (ref 80–100)
NRBC BLD-RTO: 0 /100 WBCS (ref 0–0)
PHOSPHATE SERPL-MCNC: 3.4 MG/DL (ref 2.5–4.9)
PLATELET # BLD AUTO: 146 X10*3/UL (ref 150–450)
POTASSIUM SERPL-SCNC: 4 MMOL/L (ref 3.5–5.3)
PRODUCT BLOOD TYPE: 6200
PRODUCT BLOOD TYPE: 6200
PRODUCT CODE: NORMAL
PRODUCT CODE: NORMAL
RBC # BLD AUTO: 2.76 X10*6/UL (ref 4–5.2)
SODIUM SERPL-SCNC: 136 MMOL/L (ref 136–145)
STAPHYLOCOCCUS SPEC CULT: NORMAL
UNIT ABO: NORMAL
UNIT ABO: NORMAL
UNIT NUMBER: NORMAL
UNIT NUMBER: NORMAL
UNIT RH: NORMAL
UNIT RH: NORMAL
UNIT VOLUME: 250
UNIT VOLUME: 282
WBC # BLD AUTO: 11.1 X10*3/UL (ref 4.4–11.3)

## 2025-01-26 PROCEDURE — 2500000001 HC RX 250 WO HCPCS SELF ADMINISTERED DRUGS (ALT 637 FOR MEDICARE OP): Performed by: NURSE PRACTITIONER

## 2025-01-26 PROCEDURE — 2500000005 HC RX 250 GENERAL PHARMACY W/O HCPCS: Performed by: NURSE PRACTITIONER

## 2025-01-26 PROCEDURE — 2500000004 HC RX 250 GENERAL PHARMACY W/ HCPCS (ALT 636 FOR OP/ED)

## 2025-01-26 PROCEDURE — 99233 SBSQ HOSP IP/OBS HIGH 50: CPT

## 2025-01-26 PROCEDURE — 2500000004 HC RX 250 GENERAL PHARMACY W/ HCPCS (ALT 636 FOR OP/ED): Performed by: NURSE PRACTITIONER

## 2025-01-26 PROCEDURE — 2500000001 HC RX 250 WO HCPCS SELF ADMINISTERED DRUGS (ALT 637 FOR MEDICARE OP)

## 2025-01-26 PROCEDURE — 2500000002 HC RX 250 W HCPCS SELF ADMINISTERED DRUGS (ALT 637 FOR MEDICARE OP, ALT 636 FOR OP/ED): Performed by: NURSE PRACTITIONER

## 2025-01-26 PROCEDURE — 71045 X-RAY EXAM CHEST 1 VIEW: CPT | Performed by: RADIOLOGY

## 2025-01-26 PROCEDURE — 2060000001 HC INTERMEDIATE ICU ROOM DAILY

## 2025-01-26 PROCEDURE — 99233 SBSQ HOSP IP/OBS HIGH 50: CPT | Performed by: NURSE PRACTITIONER

## 2025-01-26 PROCEDURE — 37799 UNLISTED PX VASCULAR SURGERY: CPT | Performed by: NURSE PRACTITIONER

## 2025-01-26 PROCEDURE — 80069 RENAL FUNCTION PANEL: CPT | Performed by: NURSE PRACTITIONER

## 2025-01-26 PROCEDURE — 82947 ASSAY GLUCOSE BLOOD QUANT: CPT

## 2025-01-26 PROCEDURE — 71045 X-RAY EXAM CHEST 1 VIEW: CPT

## 2025-01-26 PROCEDURE — 83735 ASSAY OF MAGNESIUM: CPT | Performed by: NURSE PRACTITIONER

## 2025-01-26 PROCEDURE — 2500000005 HC RX 250 GENERAL PHARMACY W/O HCPCS: Performed by: THORACIC SURGERY (CARDIOTHORACIC VASCULAR SURGERY)

## 2025-01-26 PROCEDURE — 85027 COMPLETE CBC AUTOMATED: CPT | Performed by: NURSE PRACTITIONER

## 2025-01-26 PROCEDURE — 82330 ASSAY OF CALCIUM: CPT | Performed by: NURSE PRACTITIONER

## 2025-01-26 RX ORDER — FERROUS SULFATE 325(65) MG
65 TABLET ORAL
Status: DISCONTINUED | OUTPATIENT
Start: 2025-01-27 | End: 2025-01-29 | Stop reason: HOSPADM

## 2025-01-26 RX ORDER — FUROSEMIDE 10 MG/ML
20 INJECTION INTRAMUSCULAR; INTRAVENOUS 2 TIMES DAILY
Status: DISCONTINUED | OUTPATIENT
Start: 2025-01-26 | End: 2025-01-28

## 2025-01-26 RX ORDER — INSULIN LISPRO 100 [IU]/ML
0-15 INJECTION, SOLUTION INTRAVENOUS; SUBCUTANEOUS
Status: DISCONTINUED | OUTPATIENT
Start: 2025-01-26 | End: 2025-01-28

## 2025-01-26 RX ORDER — MULTIVIT-MIN/IRON FUM/FOLIC AC 7.5 MG-4
1 TABLET ORAL DAILY
Status: DISCONTINUED | OUTPATIENT
Start: 2025-01-26 | End: 2025-01-29 | Stop reason: HOSPADM

## 2025-01-26 RX ADMIN — METHOCARBAMOL 500 MG: 500 TABLET ORAL at 13:48

## 2025-01-26 RX ADMIN — Medication 6 L/MIN: at 00:27

## 2025-01-26 RX ADMIN — GABAPENTIN 100 MG: 100 CAPSULE ORAL at 14:40

## 2025-01-26 RX ADMIN — ENOXAPARIN SODIUM 40 MG: 40 INJECTION SUBCUTANEOUS at 09:00

## 2025-01-26 RX ADMIN — OXYCODONE HYDROCHLORIDE 10 MG: 5 TABLET ORAL at 04:46

## 2025-01-26 RX ADMIN — MUPIROCIN 1 APPLICATION: 20 OINTMENT TOPICAL at 09:00

## 2025-01-26 RX ADMIN — ACETAMINOPHEN 650 MG: 325 TABLET, FILM COATED ORAL at 13:48

## 2025-01-26 RX ADMIN — SENNOSIDES AND DOCUSATE SODIUM 2 TABLET: 50; 8.6 TABLET ORAL at 09:00

## 2025-01-26 RX ADMIN — OXYCODONE HYDROCHLORIDE 10 MG: 5 TABLET ORAL at 12:06

## 2025-01-26 RX ADMIN — Medication 3 L/MIN: at 20:30

## 2025-01-26 RX ADMIN — ASPIRIN 81 MG CHEWABLE TABLET 81 MG: 81 TABLET CHEWABLE at 09:01

## 2025-01-26 RX ADMIN — POLYETHYLENE GLYCOL 3350 17 G: 17 POWDER, FOR SOLUTION ORAL at 09:00

## 2025-01-26 RX ADMIN — GABAPENTIN 100 MG: 100 CAPSULE ORAL at 21:33

## 2025-01-26 RX ADMIN — LIDOCAINE 4% 1 PATCH: 40 PATCH TOPICAL at 13:50

## 2025-01-26 RX ADMIN — METHOCARBAMOL 500 MG: 500 TABLET ORAL at 05:54

## 2025-01-26 RX ADMIN — LOVASTATIN 40 MG: 40 TABLET ORAL at 09:11

## 2025-01-26 RX ADMIN — Medication 1 TABLET: at 13:48

## 2025-01-26 RX ADMIN — PANTOPRAZOLE SODIUM 40 MG: 40 TABLET, DELAYED RELEASE ORAL at 06:12

## 2025-01-26 RX ADMIN — MUPIROCIN 1 APPLICATION: 20 OINTMENT TOPICAL at 21:38

## 2025-01-26 RX ADMIN — METHOCARBAMOL 500 MG: 500 TABLET ORAL at 21:35

## 2025-01-26 RX ADMIN — CEFAZOLIN SODIUM 2 G: 2 INJECTION, SOLUTION INTRAVENOUS at 06:12

## 2025-01-26 RX ADMIN — FUROSEMIDE 10 MG: 10 INJECTION, SOLUTION INTRAMUSCULAR; INTRAVENOUS at 09:01

## 2025-01-26 RX ADMIN — FUROSEMIDE 20 MG: 10 INJECTION, SOLUTION INTRAMUSCULAR; INTRAVENOUS at 13:48

## 2025-01-26 RX ADMIN — METOPROLOL TARTRATE 12.5 MG: 25 TABLET, FILM COATED ORAL at 09:00

## 2025-01-26 RX ADMIN — ACETAMINOPHEN 650 MG: 325 TABLET, FILM COATED ORAL at 21:33

## 2025-01-26 RX ADMIN — LEVOTHYROXINE SODIUM 50 MCG: 0.05 TABLET ORAL at 09:01

## 2025-01-26 RX ADMIN — MAGNESIUM SULFATE HEPTAHYDRATE 2 G: 40 INJECTION, SOLUTION INTRAVENOUS at 06:12

## 2025-01-26 RX ADMIN — GABAPENTIN 100 MG: 100 CAPSULE ORAL at 09:01

## 2025-01-26 RX ADMIN — MAGNESIUM OXIDE TAB 400 MG (241.3 MG ELEMENTAL MG) 400 MG: 400 (241.3 MG) TAB at 09:00

## 2025-01-26 RX ADMIN — SENNOSIDES AND DOCUSATE SODIUM 2 TABLET: 50; 8.6 TABLET ORAL at 21:33

## 2025-01-26 RX ADMIN — FUROSEMIDE 20 MG: 10 INJECTION, SOLUTION INTRAMUSCULAR; INTRAVENOUS at 21:34

## 2025-01-26 RX ADMIN — ACETAMINOPHEN 650 MG: 325 TABLET, FILM COATED ORAL at 09:01

## 2025-01-26 RX ADMIN — Medication 4 L/MIN: at 09:02

## 2025-01-26 RX ADMIN — METOPROLOL TARTRATE 12.5 MG: 25 TABLET, FILM COATED ORAL at 21:33

## 2025-01-26 ASSESSMENT — ENCOUNTER SYMPTOMS
EYES NEGATIVE: 1
COUGH: 0
DOUBLE VISION: 0
PSYCHIATRIC NEGATIVE: 1
WHEEZING: 0
BLURRED VISION: 0
FEVER: 0
BLOATING: 0
MUSCLE CRAMPS: 0
DIAPHORESIS: 0
NUMBNESS: 0
IRREGULAR HEARTBEAT: 0
ABDOMINAL PAIN: 0
PARESTHESIAS: 0
HEMATOLOGIC/LYMPHATIC NEGATIVE: 1
DECREASED APPETITE: 1
ENDOCRINE NEGATIVE: 1
WEAKNESS: 1
GASTROINTESTINAL NEGATIVE: 1
SHORTNESS OF BREATH: 0

## 2025-01-26 ASSESSMENT — PAIN - FUNCTIONAL ASSESSMENT
PAIN_FUNCTIONAL_ASSESSMENT: 0-10

## 2025-01-26 ASSESSMENT — COGNITIVE AND FUNCTIONAL STATUS - GENERAL
MOVING TO AND FROM BED TO CHAIR: A LITTLE
DRESSING REGULAR LOWER BODY CLOTHING: A LITTLE
CLIMB 3 TO 5 STEPS WITH RAILING: A LITTLE
MOBILITY SCORE: 21
WALKING IN HOSPITAL ROOM: A LITTLE
DAILY ACTIVITIY SCORE: 23

## 2025-01-26 ASSESSMENT — PAIN SCALES - GENERAL
PAINLEVEL_OUTOF10: 4
PAINLEVEL_OUTOF10: 2
PAINLEVEL_OUTOF10: 0 - NO PAIN
PAINLEVEL_OUTOF10: 0 - NO PAIN
PAINLEVEL_OUTOF10: 7

## 2025-01-26 ASSESSMENT — PAIN SCALES - PAIN ASSESSMENT IN ADVANCED DEMENTIA (PAINAD): TOTALSCORE: MEDICATION (SEE MAR)

## 2025-01-26 ASSESSMENT — PAIN DESCRIPTION - DESCRIPTORS: DESCRIPTORS: ACHING

## 2025-01-26 NOTE — PROGRESS NOTES
Occupational Therapy                 Therapy Communication Note    Patient Name: Jessy Garcia  MRN: [id number]  Department: [facility]  Room: 174/174-A  Today's Date: 1/26/2025     Discipline: Occupational Therapy          Missed Visit Reason: Missed Visit Reason: Patient refused (Pt. reports was already up with nrsg and declined to get oob again at this time.  Will reschedule for tomorrow as appropriate.)    Missed Time: Attempt    Comment:

## 2025-01-26 NOTE — PROGRESS NOTES
Physical Therapy                 Therapy Communication Note    Patient Name: Jessy Garcia  MRN: [id number]  Department: [facility]  Room: 174/174-A  Today's Date: 1/26/2025     Discipline: Physical Therapy    PT Missed Visit: Yes     Missed Visit Reason: Patient refused (Pt. reports was already up with nrsg and declined to get oob again at this time.  Will reschedule for tomorrow as appropriate.)    Missed Time: Attempt

## 2025-01-26 NOTE — PROGRESS NOTES
Subjective   Jessy Garcia is a 75 y.o. female who presents with Hypertension and chest discomfort.    Patient is sitting up in chair this morning.  Chest tubes were removed yesterday.  Patient has been on metoprolol since yesterday.  Review of chest x-ray shows atelectasis on the left.  Patient encouraged to use incentive spirometer and to go for a walk with PT and OT today.    Objective   Vitals:    01/26/25 0700   BP: 109/55   Pulse: 69   Resp: 18   Temp:    SpO2:       Physical Exam  Physical Exam:  Constitutional: well developed, awake, alert, no acute distress  ENMT: mucous membranes moist, EOMI, conjunctivae clear  Head/Neck: normocephalic, atraumatic; supple, trachea midline  Respiratory/Thorax: patent airways, CTAB; no wheezes, rales, or rhonchi  Cardiovascular: RRR, no murmur  Gastrointestinal: soft, nondistended, non-tender, bowel sounds appreciated  Extremities: palpable peripheral pulses, no edema or cyanosis  Neurological: AO x3, no focal deficits  Psychological: appropriate mood and behavior  Skin: warm and dry    Assessment/Plan       Jessy Garcia is a 75 year old female with PMH of HTN, HLD, hypothyroidism, carotid stenosis s/p bilateral CEA, obesity who presented to [facility] 1/20/2025 with NSTEMI. [facility] performed 1/21 by Dr. Palacio and was found to have triple vessel disease. S/p coronary artery bypass x 4 (LIMA to LAD, Radial artery to RCA, SVG to OM, SVG to diagonal 2).     Neuro:  - Patient orientated to person place and time  - Analgesia: Gabapentin, PRN Tylenol, Oxycodone, Fentanyl per CTS  - Maintain normal sleep/wake cycle  - Avoid oversedating patient     Cardiovascular  #s/p CABG x 4 (LIMA to LAD, Radial artery to RCA, SVG to OM, SVG to diagonal 2)  #Hx HTN  #Hx HLD  - Last TTE 1/20/25 showed EF 55-60%  - Patient with postop hypotension after CABG surgery, improved  - Maintain MAP >70, wean as tolerated.  - On asa, lovastatin, metoprolol 12.5 mg  - Diuresis per CTS. Keep Mg>2, K>4.      Pulmonary:  - Patient with postop respiratory insufficiency, improving  - Patient extubated 1/25/25, on 6L NC, maintain O2 sats>92%.  - Chest tubes remove 1/25/25  - Encourage incentive spirometer use q1  - Daily CXR     GI:  - Diet: NPO advance per CTS  - Prophylaxis: PPI     Renal:   - No current issues  - Nava in place, strict I&O's  - Trend renal panel, replete electrolytes PRN     Endocrine:  #Hypothyroidism  - Continue home levothyroxine  - SSI, maintain BG between 140-180s     Heme/Onc:  - DVT PPX: Lovenox  - Postop anemia as expected  - Trend H&H     ID:  - Prophylactic antibiotics per CTS  - CXR no acute pulmonary process     Skin/MSK:  - No acute issues     ICU CHECK LIST:   Antimicrobials: ancef per CTS  Oxygen: 6L NC  Feeding: advance per CTS  Fluids:   Analgesia: per CTS  Sedation:   Thromboprophylaxis: Lovenox  Ulcer prophylaxis: PPI  Glycemic control: SSI  Bowel care: polly-colase, metamucil  Indwelling catheters: nava catheter  Lines: R IJ, PIVs     Code Status: FULL     This is a preliminary note written by the resident. Please wait for attending addendum for finalization of note and recommendations.     Adithya Otoole DO, PhD  Internal Medicine PGY2

## 2025-01-26 NOTE — PROGRESS NOTES
[facility]   Cardiothoracic Surgery   Progress Note        Jessy Garcia is a 75 y.o. female on day 5 of admission presenting with Chest pain, unspecified type.    Subjective     Interval HPI: Seen and assessed patient this morning while she was sitting up in her bedside chair. In no acute distress and without significant complaints.       Review of Systems   Constitutional: Positive for decreased appetite and malaise/fatigue. Negative for diaphoresis and fever.   HENT: Negative.  Negative for congestion.    Eyes: Negative.  Negative for blurred vision and double vision.   Cardiovascular:  Negative for chest pain and irregular heartbeat.   Respiratory:  Negative for cough, shortness of breath and wheezing.    Endocrine: Negative.    Hematologic/Lymphatic: Negative.    Skin: Negative.    Musculoskeletal:  Positive for muscle weakness. Negative for muscle cramps.   Gastrointestinal: Negative.  Negative for bloating and abdominal pain.   Genitourinary: Negative.    Neurological:  Positive for weakness. Negative for numbness and paresthesias.   Psychiatric/Behavioral: Negative.           Objective   Physical Exam  Physical Exam  Vitals and nursing note reviewed.   Constitutional:       General: She is not in acute distress.     Appearance: Normal appearance. She is not ill-appearing.      Interventions: Nasal cannula in place.   HENT:      Head: Normocephalic and atraumatic.      Nose: Nose normal.      Mouth/Throat:      Mouth: Mucous membranes are moist.      Pharynx: Oropharynx is clear.   Eyes:      Extraocular Movements: Extraocular movements intact.      Conjunctiva/sclera: Conjunctivae normal.      Pupils: Pupils are equal, round, and reactive to light.   Cardiovascular:      Rate and Rhythm: Normal rate and regular rhythm.      Pulses: Normal pulses.      Heart sounds: Normal heart sounds, S1 normal and S2 normal. No murmur heard.     No systolic murmur is present.      No  friction rub. No gallop.   Pulmonary:      Effort: Pulmonary effort is normal.      Breath sounds: Normal breath sounds.   Abdominal:      General: Abdomen is flat. Bowel sounds are normal. There is no distension.      Palpations: Abdomen is soft.   Musculoskeletal:         General: Normal range of motion.      Cervical back: Normal range of motion and neck supple.      Right lower leg: No edema.      Left lower leg: No edema.   Skin:     General: Skin is warm and dry.      Capillary Refill: Capillary refill takes less than 2 seconds.      Findings: No lesion.      Nails: There is no clubbing.      Comments: Mediastinal incision well approximated, PERRY, C/D/I   Neurological:      General: No focal deficit present.      Mental Status: She is alert and oriented to person, place, and time. Mental status is at baseline.      Cranial Nerves: Cranial nerves 2-12 are intact.      Sensory: Sensation is intact.      Motor: Motor function is intact.   Psychiatric:         Attention and Perception: Attention and perception normal.         Mood and Affect: Mood normal.         Speech: Speech normal.         Behavior: Behavior normal.         Thought Content: Thought content normal.         Cognition and Memory: Cognition and memory normal.         Judgment: Judgment normal.         Last Recorded Vitals  Vitals:    01/26/25 0900 01/26/25 1000 01/26/25 1100 01/26/25 1200   BP: 131/61 122/59 119/57 107/58   BP Location:       Pulse: 86 77 72 74   Resp: 23 21 (!) 38 (!) 30   Temp:       TempSrc:       SpO2:       Weight:       Height:            Intake/Output last 3 Shifts:  I/O last 3 completed shifts:  In: 2257.4 (24.2 mL/kg) [I.V.:257.4 (2.8 mL/kg); IV Piggyback:2000]  Out: 2107 (22.6 mL/kg) [Urine:1765 (0.5 mL/kg/hr); Chest Tube:342]  Weight: 93.3 kg     Inpatient Medications  acetaminophen, 650 mg, oral, q6h  aspirin, 81 mg, oral, Daily  ceFAZolin, 2 g, intravenous, q8h  enoxaparin, 40 mg, subcutaneous, Daily  furosemide, 10  mg, intravenous, Daily  gabapentin, 100 mg, oral, TID  insulin lispro, 0-15 Units, subcutaneous, q4h  levothyroxine, 50 mcg, oral, Daily  lidocaine, 1 patch, transdermal, q24h  lovastatin, 40 mg, oral, Daily  magnesium oxide, 400 mg, oral, Daily  methocarbamol, 500 mg, oral, q8h CHRIS  metoprolol tartrate, 12.5 mg, oral, BID  mupirocin, 1 Application, Each Nostril, BID  oxygen, , inhalation, Continuous - Inhalation  pantoprazole, 40 mg, oral, Daily before breakfast   Or  pantoprazole, 40 mg, intravenous, Daily before breakfast  perflutren lipid microspheres, 0.5-10 mL of dilution, intravenous, Once in imaging  perflutren protein A microsphere, 0.5 mL, intravenous, Once in imaging  polyethylene glycol, 17 g, oral, Daily  sennosides-docusate sodium, 2 tablet, oral, BID  sulfur hexafluoride microsphr, 2 mL, intravenous, Once in imaging      Continuous medications  EPINEPHrine, 0-2 mcg/kg/min  norepinephrine, 0-3 mcg/kg/min, Last Rate: Stopped (01/25/25 1254)      PRN medications  PRN medications: alteplase, calcium chloride, calcium chloride, fentaNYL, magnesium sulfate, magnesium sulfate, melatonin, metoclopramide **OR** metoclopramide, naloxone, ondansetron ODT **OR** ondansetron, oxyCODONE, oxyCODONE, potassium chloride CR **OR** potassium chloride, potassium chloride CR **OR** potassium chloride, potassium chloride, potassium chloride, sodium chloride     LDA:       Relevant Results  Lab Review  Results from last 7 days   Lab Units 01/26/25 0443   WBC AUTO x10*3/uL 11.1   HEMOGLOBIN g/dL 8.1*   HEMATOCRIT % 25.2*   PLATELETS AUTO x10*3/uL 146*     Results from last 7 days   Lab Units 01/26/25  0443 01/22/25  0715 01/20/25  0148   SODIUM mmol/L 136   < > 138   POTASSIUM mmol/L 4.0   < > 4.2   CHLORIDE mmol/L 102   < > 103   CO2 mmol/L 29   < > 27   BUN mg/dL 13   < > 21   CREATININE mg/dL 0.92   < > 0.86   CALCIUM mg/dL 8.3*   < > 9.9   PROTEIN TOTAL g/dL  --   --  8.3*   BILIRUBIN TOTAL mg/dL  --   --  0.2   ALK  PHOS U/L  --   --  89   ALT U/L  --   --  16   AST U/L  --   --  17   GLUCOSE mg/dL 123*   < > 118*    < > = values in this interval not displayed.     Results from last 7 days   Lab Units 01/26/25  0443   MAGNESIUM mg/dL 1.87     Results from last 7 days   Lab Units 01/25/25  0712   POCT PH, ARTERIAL pH 7.39   POCT PCO2, ARTERIAL mm Hg 41   POCT PO2, ARTERIAL mm Hg 77*   POCT HCO3 CALCULATED, ARTERIAL mmol/L 24.8   POCT BASE EXCESS, ARTERIAL mmol/L -0.2         Radiographic Testing    Echo:  Transthoracic Echo (TTE) Complete 01/20/2025  PHYSICIAN INTERPRETATION:  Left Ventricle: Left ventricular ejection fraction is normal, by visual estimate at 55-60%. There are no regional left ventricular wall motion abnormalities. The left ventricular cavity size is normal. There is mildly increased septal and mildly increased posterior left ventricular wall thickness. There is left ventricular concentric remodeling. Spectral Doppler shows a Grade I (impaired relaxation pattern) of left ventricular diastolic filling with normal left atrial filling pressure.  Left Atrium: The left atrium is normal in size.  Right Ventricle: The right ventricle is normal in size. There is normal right ventricular global systolic function.  Right Atrium: The right atrium is normal in size.  Aortic Valve: The aortic valve is trileaflet. There is mild aortic valve thickening. There is evidence of mildly elevated transaortic gradients consistent with sclerosis of the aortic valve. The aortic valve dimensionless index is 0.65. There is trace aortic valve regurgitation. The peak instantaneous gradient of the aortic valve is 12 mmHg. The mean gradient of the aortic valve is 8 mmHg.  Mitral Valve: The mitral valve is mildly thickened. The peak instantaneous gradient of the mitral valve is 8 mmHg. There is moderate mitral valve regurgitation. The mitral regurgitant orifice area is 7 mm2. The mitral regurgitant volume is 15.75 ml.  Tricuspid Valve: The  tricuspid valve is structurally normal. There is trace tricuspid regurgitation.  Pulmonic Valve: The pulmonic valve is structurally normal. There is trace pulmonic valve regurgitation.  Pericardium: There is no pericardial effusion noted.  Aorta: The aortic root is normal.  Systemic Veins: The inferior vena cava appears normal in size.  In comparison to the previous echocardiogram(s): Compared with study dated 5/16/2011,.        CONCLUSIONS:   1. Left ventricular ejection fraction is normal, by visual estimate at 55-60%.   2. Spectral Doppler shows a Grade I (impaired relaxation pattern) of left ventricular diastolic filling with normal left atrial filling pressure.   3. There is normal right ventricular global systolic function.   4. Moderate mitral valve regurgitation.   5. Aortic valve sclerosis.    Ejection Fractions:  EF   Date/Time Value Ref Range Status   01/20/2025 02:15 PM 58 %      Cath:  Cardiac Catheterization Procedure 01/21/2025  Coronary Angiography Comments:  64-year-old woman with a history of bilateral carotid endarterectomy, uncontrolled hypertension, uncontrolled hyperlipidemia presents with unstable angina. Urgent heart cath recommended.     Fluoroscopy demonstrated moderate calcification of the coronary tree.     Left main: Short vessel. No significant disease.  LAD: Large vessel. Tortuous and calcified. In the mid LAD there is a complex 80 to 90% stenosis involving a large diagonal and moderate-sized septal. The remainder the LAD has moderate disease.  Circumflex: Moderate size vessel. Proximal 80% stenosis. Remainder the vessel is free of disease.  RCA: Dominant vessel. Ostial 90% stenosis.  LVEDP 21 mmHg. Left atrial angiography not performed.    Imaging:  XR chest 1 view   Final Result   Left-sided airspace consolidation, as above. Clinical correlation and   continued follow-up until clearing is recommended        MACRO:   None.        Signed by: Gerardo Oneill 1/26/2025 11:50 AM   Dictation  workstation:   [id number]      XR abdomen 1 view   Final Result   Interval decrease in previously seen gaseous distension.        Nonspecific nonobstructive bowel gas pattern.        MACRO:   None        Signed by: Clayton Cantu 1/25/2025 11:38 PM   Dictation workstation:   [id number]      XR abdomen 1 view   Final Result   1. Gas distended stomach, otherwise nonobstructive gaseous pattern.        MACRO:   None        Signed by: Rony Powell 1/25/2025 7:01 AM   Dictation workstation:   [id number]      XR chest 1 view   Final Result   1. No signal interval change.        Signed by: Rony Powell 1/25/2025 6:28 AM   Dictation workstation:   [id number]      XR chest 1 view   Final Result   1.  Postsurgical changes with medical devices as detailed. Possible   left pleural effusion and bilateral lower lobe airspace opacities   including a left retrocardiac opacity which could be related to   atelectasis or infection, similar to prior imaging.                  MACRO:   None        Signed by: Mariposa Garcia 1/24/2025 11:16 PM   Dictation workstation:   [id number]      XR chest 1 view   Final Result   1. Postoperative changes, as above.   2. Diffuse interstitial infiltrates bilaterally, as above. Clinical   correlation and continued follow-up until clearing is recommended.        MACRO:   None.        Signed by: Gerardo Oneill 1/24/2025 3:25 PM   Dictation workstation:   [id number]      Vascular US upper extremity arterial duplex bilateral   Final Result      Vascular US lower extremity vein mapping bilateral   Final Result      Vascular US carotid artery duplex bilateral   Final Result      Vascular US palmar arch evaluation   Final Result      Cardiac Catheterization Procedure   Final Result      Transthoracic Echo (TTE) Complete   Final Result      CT angio chest for pulmonary embolism   Final Result   1.No CT evidence of pulmonary embolism.   2.Normal aorta.   3.Small pulmonary nodule in the right upper lobe  "measuring 0.4   cm. As an isolated phenomena likely of little significance but   correlate with any malignancy history.   4.No evidence of pulmonary artery filling defect to suggest a   pulmonary embolism.   5.No evidence of aortic dissection or aneurysmal dilatation.   6.Small distal esophageal hiatal hernia.   7.Atherosclerotic changes of the abdominal aorta and its   branches. There is some narrowing of the proximal celiac axis   estimated be approximately 50-60%.   Signed by Albino Ritchie MD      XR chest 1 view   Final Result   No acute process.   Signed by Jo Veras MD      Transesophageal Echo (BAKARI)    (Results Pending)             History of Present Illness: Jessy Garcia is a 75 y.o. female with a PMH significant for HTN, HLD, hypothyroidism, carotid stenosis s/p bilateral CEA, obesity who presented to [facility] on 1/20/2025 with NSTEMI. Patient reports the pain initially began approximately 4 days ago after shoveling snow. She reports the pain radiates across her chest she describes the pain as a \"hurt\"; severity 8/10 at its worst. She does report the pain is more noticeable with minimal exertion but also states the pain occurs at rest. She does report some associated shortness of breath, she denies any nausea, vomiting, diaphoresis. She denies any recent fevers chills, abdominal pain, headache, dizziness. She reports she is intermittently compliant with her blood pressure and cholesterol medications.      ED course: significant for HS tropnin (36), D-dimer 717 with CT PE negative for PE.     C performed 1/21 by Dr. Palacio and she was found to have triple vessel disease. Cardiac surgery was consulted for CABG evaluation.     Daily Events    1/22/25: No acute events overnight; currently working on staging an operative date. Continue inpatient admission given NSTEMI.      1/23/25: Continues with no acute anginal complaints overnight. Plan for patient to undergo " bypass grafting tomorrow on 1/24/25 with Dr. Deyvi Gonzalez.     1/24/25: No acute events overnight; plan for OR today with Dr. Deyvi Gonzalez.     1/24/25 (Post-op): Patient arrived to the ICU in critically ill but stable condition. Currently intubate & sedated without need for vasopressors/dilators. Plan to reverse paralytic therapy and wean mechanical ventilation towards extubation.   - Vital Signs: HR 63, /59 (82), SpO2 94%   - Hemodynamics: CVP 9, CO 3.8, CI 2.1, SVR 1367  - Ventilator Settings: PRVC-AC / FiO2 50% / PEEP 5 / RR 16   - Pump time: 90 minutes   Intake & Output:   - Initial R Pleural Chest Tube: 140mL  - Initial L Pleural Chest Tube: 20mL  - Initial Mediastinal Chest Tube: 40mL    1/25/2025:  Patient is POD#1 s/p CABGx4. She was extubated this morning to 6L NC. Currently remains on 0.02 of levo, will give albumin in hopes to wean off vasoactive gtts. L pleural chest tube: 50mL, R pleural chest tube: 330mL, Mediastinal chest tube: 95mL. Will get oob and into chair this morning. Likely will remove all chest tubes later today after ambulation.     - Current O2 Requirements: 6L NC     1/26/2025: No acute events overnight; patient has remained off norepinephrine. Currently weaning down on supplemental O2. Increasing diuresis and focusing on progressive mobility.     - Current O2 Requirements: 4L NC     Assessment & Plan         Multivessel Coronary Artery Disease  - Patient admitted with [facility] on 1/20/25  --> [facility] on 1/21/25 revealed diffuse multivessel disease including the mid LAD, prox-Lcx and ostial RCA  - S/p CABG x 4 on 1/24/25 with Dr. Deyvi Gonzalez   --> LIMA-LAD, SVG-Diag, SVG-OM, RA-RCA  - Continue on ASA 81mg and Lovastatin 40mg   - Continue on metoprolol tartrate 12.5mg BID   ---> Advance as tolerated        Radial Grafting  - S/p radial harvest site  - Consider addition of nitrates as indicated        Mediastinal Incision  - PERRY, well approximated, C/D/I        Acute Post-Op Pain  - As  expected   - Multimodal pain control   --> Scheduled: Acetaminophen, magnesium oxide 400mg QD, gabapentin 100mg TID, methocarbamol 500mg q6h   --> PRN: oxycodone & fentanyl  - Bowel regimen while taking narcotics        Risk for Post-Op Arrhythmia  - Ventricular wires placed  - Discontinue V-wires prior to DC  - Telemetry until discharged       Acute Postoperative Respiratory Insufficiency   - As expected following CABG with post-op atelectasis  - Current O2 Requirements: 4L NC   - Coughing and deep breathing exercises with Incentive spirometry  - Out of bed, early and aggressive mobilization with assistance  - Oxygen as needed, wean to keep saturation above 92%  - ICU intensivist/pulmonologist consulted  - Albuterol nebulizers as needed       Risk for Fluid Volume Overload  - Pre-Op Weight: 88.6 kg      1/25: 93kg --> Plan to start low dose diuresis today     1/26: 93.3 kg (net negative 319mL) --> IV furosemide 20mg BID   - Strict I& Os and daily weights    - Monitor hemodynamics        Acute Post-Op Blood-Loss Anemia  - Pre-Op H/H:  14/44.4  --> Immediate post-op: 9.9/29.8     1/25: 9.6/28.1     1/26: 8.1/25.2  - Monitor h/h in the initial post op period  - Multivitamin/iron tablet   - Daily CBC while in hospital       Post-Op Leukocytosis  - Pre-Op WBC: 7.3  --> Immediate post-op: 14.5     1/25: 9.6     1/26: 11.1  - Afebrile, consider elevations as reactive to surgery   - Post-op ATB Q12 for 48hrs  - Daily cbc while in hospital        Dyslipidemia  - Home Medications: Lovastatin 40mg   - Continued        Hypothyroidism   - Home Medications: Levothyroxine 50 mcg  - Continued        Risk for Post-op Hyperglycemia  - Home Medications: None  - HbA1c: 5.8  - Goal for BG <150 in the post-operative period  - SSI       Risk for Electrolyte Disturbances  - Optimize electrolytes per [facility] protocol      Bowel Regimen  - Senna-S BID, miralax daily  - PRN suppositories     Prophylaxis  - GI: PPI  - DVT: Duy-Hose &  enoxaparin   - MRSA: Pending (1/24)  - PT/OT     Disposition  - CVICU (stepdown status)     Above patient seen and plan discussed with Dr. Deyvi Gonzalez, Plan as above.     PATSY Jauregui-CNP

## 2025-01-26 NOTE — PROGRESS NOTES
Occupational Therapy                 Therapy Communication Note    Patient Name: Jessy Garcia  MRN: [id number]  Department: [facility]  Room: [facility]  Today's Date: 1/26/2025     Discipline: Occupational Therapy          Missed Visit Reason: Missed Visit Reason: Patient refused (pt refused due to pain,nsg notified)    Missed Time: Attempt    Comment:

## 2025-01-26 NOTE — CARE PLAN
The clinical goals for the shift include pt will remain hemodynamically stable throughout shift        Problem: Skin  Goal: Promote/optimize nutrition  Outcome: Progressing     Problem: Pain  Goal: Takes deep breaths with improved pain control throughout the shift  Outcome: Progressing  Goal: Turns in bed with improved pain control throughout the shift  Outcome: Progressing  Goal: Walks with improved pain control throughout the shift  Outcome: Progressing  Goal: Performs ADL's with improved pain control throughout shift  Outcome: Progressing  Goal: Participates in PT with improved pain control throughout the shift  Outcome: Progressing  Goal: Free from opioid side effects throughout the shift  Outcome: Progressing  Goal: Free from acute confusion related to pain meds throughout the shift  Outcome: Progressing     Problem: Pain - Adult  Goal: Verbalizes/displays adequate comfort level or baseline comfort level  Outcome: Progressing     Problem: Safety - Adult  Goal: Free from fall injury  Outcome: Progressing     Problem: Discharge Planning  Goal: Discharge to home or other facility with appropriate resources  Outcome: Progressing     Problem: Chronic Conditions and Co-morbidities  Goal: Patient's chronic conditions and co-morbidity symptoms are monitored and maintained or improved  Outcome: Progressing     Problem: Nutrition  Goal: Nutrient intake appropriate for maintaining nutritional needs  Outcome: Progressing     Problem: Resident is recovering from cardiovascular surgery  Goal: I will maintain and build strength.  Outcome: Progressing  Goal: I will decrease complications and risks after surgery  Outcome: Progressing     Problem: Respiratory  Goal: Clear secretions with interventions this shift  Outcome: Progressing  Goal: Minimize anxiety/maximize coping throughout shift  Outcome: Progressing  Goal: Minimal/no exertional discomfort or dyspnea this shift  Outcome: Progressing  Goal: No signs of respiratory  distress (eg. Use of accessory muscles. Peds grunting)  Outcome: Progressing  Goal: Patent airway maintained this shift  Outcome: Progressing  Goal: Tolerate mechanical ventilation evidenced by VS/agitation level this shift  Outcome: Progressing  Goal: Tolerate pulmonary toileting this shift  Outcome: Progressing  Goal: Verbalize decreased shortness of breath this shift  Outcome: Progressing  Goal: Wean oxygen to maintain O2 saturation per order/standard this shift  Outcome: Progressing  Goal: Increase self care and/or family involvement in next 24 hours  Outcome: Progressing     Problem: Meds/Post-op Pain  Goal: Pain controlled to tolerate pain level  Outcome: Progressing  Goal: Tolerates prescribed medication  Outcome: Progressing     Problem: DVT/VTE Prevention/Activity  Goal: No decrease in circulation/sensation  Outcome: Progressing  Goal: Prevent skin breakdown  Outcome: Progressing  Goal: Return to preop oxygenation status  Outcome: Progressing  Goal: Tolerates optimal activity  Outcome: Progressing  Goal: Increase self care and/or family involvement in 24 hrs.  Outcome: Progressing     Problem: Wound care/infection prevention  Goal: No signs of infection in 24 hrs.  Outcome: Progressing  Goal: No unexpected bleeding from incision this shift  Outcome: Progressing     Problem: Diet/fluid balance  Goal: Adequate urinary output  Outcome: Progressing  Goal: Free from nausea/vomiting  Outcome: Progressing  Goal: Return in bowel function  Outcome: Progressing  Goal: Tolerates prescribed diet  Outcome: Progressing     Problem: Other goals  Goal: No change in neurological status  Outcome: Progressing  Goal: Stabilize vital signs (return to 10% of baseline)  Outcome: Progressing     Problem: Fall/Injury  Goal: Not fall by end of shift  Outcome: Progressing  Goal: Be free from injury by end of the shift  Outcome: Progressing  Goal: Verbalize understanding of personal risk factors for fall in the hospital  Outcome:  Progressing  Goal: Verbalize understanding of risk factor reduction measures to prevent injury from fall in the home  Outcome: Progressing  Goal: Use assistive devices by end of the shift  Outcome: Progressing  Goal: Pace activities to prevent fatigue by end of the shift  Outcome: Progressing     Problem: Safety - Medical Restraint  Goal: Remains free of injury from restraints (Restraint for Interference with Medical Device)  Outcome: Progressing  Goal: Free from restraint(s) (Restraint for Interference with Medical Device)  Outcome: Progressing

## 2025-01-27 ENCOUNTER — APPOINTMENT (OUTPATIENT)
Dept: CARDIOLOGY | Facility: HOSPITAL | Age: 75
DRG: 234 | End: 2025-01-27
Payer: MEDICARE

## 2025-01-27 ENCOUNTER — APPOINTMENT (OUTPATIENT)
Dept: RADIOLOGY | Facility: HOSPITAL | Age: 75
DRG: 234 | End: 2025-01-27
Payer: MEDICARE

## 2025-01-27 VITALS
TEMPERATURE: 97.5 F | HEART RATE: 80 BPM | HEIGHT: 60 IN | RESPIRATION RATE: 22 BRPM | DIASTOLIC BLOOD PRESSURE: 54 MMHG | BODY MASS INDEX: 40.37 KG/M2 | SYSTOLIC BLOOD PRESSURE: 97 MMHG | WEIGHT: 205.6 LBS | OXYGEN SATURATION: 95 %

## 2025-01-27 LAB
ALBUMIN SERPL BCP-MCNC: 3.6 G/DL (ref 3.4–5)
ANION GAP BLDA CALCULATED.4IONS-SCNC: 10 MMO/L (ref 10–25)
ANION GAP SERPL CALC-SCNC: 12 MMOL/L (ref 10–20)
BASE EXCESS BLDA CALC-SCNC: -0.8 MMOL/L (ref -2–3)
BODY TEMPERATURE: 37 DEGREES CELSIUS
BUN SERPL-MCNC: 16 MG/DL (ref 6–23)
CA-I BLDA-SCNC: 1.17 MMOL/L (ref 1.1–1.33)
CALCIUM SERPL-MCNC: 7.9 MG/DL (ref 8.6–10.3)
CHLORIDE BLDA-SCNC: 104 MMOL/L (ref 98–107)
CHLORIDE SERPL-SCNC: 100 MMOL/L (ref 98–107)
CO2 SERPL-SCNC: 30 MMOL/L (ref 21–32)
CREAT SERPL-MCNC: 0.88 MG/DL (ref 0.5–1.05)
EGFRCR SERPLBLD CKD-EPI 2021: 69 ML/MIN/1.73M*2
ERYTHROCYTE [DISTWIDTH] IN BLOOD BY AUTOMATED COUNT: 13.9 % (ref 11.5–14.5)
GLUCOSE BLD MANUAL STRIP-MCNC: 108 MG/DL (ref 74–99)
GLUCOSE BLD MANUAL STRIP-MCNC: 116 MG/DL (ref 74–99)
GLUCOSE BLD MANUAL STRIP-MCNC: 116 MG/DL (ref 74–99)
GLUCOSE BLD MANUAL STRIP-MCNC: 120 MG/DL (ref 74–99)
GLUCOSE BLD MANUAL STRIP-MCNC: 125 MG/DL (ref 74–99)
GLUCOSE BLD MANUAL STRIP-MCNC: 143 MG/DL (ref 74–99)
GLUCOSE BLDA-MCNC: 124 MG/DL (ref 74–99)
GLUCOSE SERPL-MCNC: 133 MG/DL (ref 74–99)
HCO3 BLDA-SCNC: 24.9 MMOL/L (ref 22–26)
HCT VFR BLD AUTO: 25.6 % (ref 36–46)
HCT VFR BLD EST: 40 % (ref 36–46)
HGB BLD-MCNC: 8.3 G/DL (ref 12–16)
HGB BLDA-MCNC: 13.2 G/DL (ref 12–16)
HOLD SPECIMEN: NORMAL
INHALED O2 CONCENTRATION: 100 %
LACTATE BLDA-SCNC: 1.2 MMOL/L (ref 0.4–2)
MAGNESIUM SERPL-MCNC: 2.07 MG/DL (ref 1.6–2.4)
MCH RBC QN AUTO: 29.7 PG (ref 26–34)
MCHC RBC AUTO-ENTMCNC: 32.4 G/DL (ref 32–36)
MCV RBC AUTO: 92 FL (ref 80–100)
NRBC BLD-RTO: 0 /100 WBCS (ref 0–0)
OXYHGB MFR BLDA: 97.5 % (ref 94–98)
PCO2 BLDA: 44 MM HG (ref 38–42)
PH BLDA: 7.36 PH (ref 7.38–7.42)
PHOSPHATE SERPL-MCNC: 2.1 MG/DL (ref 2.5–4.9)
PLATELET # BLD AUTO: 170 X10*3/UL (ref 150–450)
PO2 BLDA: 419 MM HG (ref 85–95)
POTASSIUM BLDA-SCNC: 4.2 MMOL/L (ref 3.5–5.3)
POTASSIUM SERPL-SCNC: 4.1 MMOL/L (ref 3.5–5.3)
RBC # BLD AUTO: 2.79 X10*6/UL (ref 4–5.2)
SAO2 % BLDA: 99 % (ref 94–100)
SODIUM BLDA-SCNC: 135 MMOL/L (ref 136–145)
SODIUM SERPL-SCNC: 138 MMOL/L (ref 136–145)
WBC # BLD AUTO: 8 X10*3/UL (ref 4.4–11.3)

## 2025-01-27 PROCEDURE — 99232 SBSQ HOSP IP/OBS MODERATE 35: CPT

## 2025-01-27 PROCEDURE — 2500000001 HC RX 250 WO HCPCS SELF ADMINISTERED DRUGS (ALT 637 FOR MEDICARE OP)

## 2025-01-27 PROCEDURE — 71045 X-RAY EXAM CHEST 1 VIEW: CPT

## 2025-01-27 PROCEDURE — 97161 PT EVAL LOW COMPLEX 20 MIN: CPT | Mod: GP

## 2025-01-27 PROCEDURE — 82947 ASSAY GLUCOSE BLOOD QUANT: CPT

## 2025-01-27 PROCEDURE — 80069 RENAL FUNCTION PANEL: CPT | Performed by: NURSE PRACTITIONER

## 2025-01-27 PROCEDURE — 85027 COMPLETE CBC AUTOMATED: CPT | Performed by: NURSE PRACTITIONER

## 2025-01-27 PROCEDURE — 2500000005 HC RX 250 GENERAL PHARMACY W/O HCPCS: Performed by: THORACIC SURGERY (CARDIOTHORACIC VASCULAR SURGERY)

## 2025-01-27 PROCEDURE — 2500000005 HC RX 250 GENERAL PHARMACY W/O HCPCS

## 2025-01-27 PROCEDURE — 36415 COLL VENOUS BLD VENIPUNCTURE: CPT | Performed by: NURSE PRACTITIONER

## 2025-01-27 PROCEDURE — 2500000001 HC RX 250 WO HCPCS SELF ADMINISTERED DRUGS (ALT 637 FOR MEDICARE OP): Performed by: NURSE PRACTITIONER

## 2025-01-27 PROCEDURE — 2500000005 HC RX 250 GENERAL PHARMACY W/O HCPCS: Performed by: NURSE PRACTITIONER

## 2025-01-27 PROCEDURE — 83735 ASSAY OF MAGNESIUM: CPT | Performed by: NURSE PRACTITIONER

## 2025-01-27 PROCEDURE — 2060000001 HC INTERMEDIATE ICU ROOM DAILY

## 2025-01-27 PROCEDURE — 2500000004 HC RX 250 GENERAL PHARMACY W/ HCPCS (ALT 636 FOR OP/ED)

## 2025-01-27 PROCEDURE — 2500000004 HC RX 250 GENERAL PHARMACY W/ HCPCS (ALT 636 FOR OP/ED): Performed by: NURSE PRACTITIONER

## 2025-01-27 PROCEDURE — 71045 X-RAY EXAM CHEST 1 VIEW: CPT | Performed by: STUDENT IN AN ORGANIZED HEALTH CARE EDUCATION/TRAINING PROGRAM

## 2025-01-27 PROCEDURE — 2500000002 HC RX 250 W HCPCS SELF ADMINISTERED DRUGS (ALT 637 FOR MEDICARE OP, ALT 636 FOR OP/ED): Performed by: NURSE PRACTITIONER

## 2025-01-27 PROCEDURE — 97165 OT EVAL LOW COMPLEX 30 MIN: CPT | Mod: GO

## 2025-01-27 RX ORDER — CLOPIDOGREL BISULFATE 75 MG/1
75 TABLET ORAL DAILY
Status: DISCONTINUED | OUTPATIENT
Start: 2025-01-27 | End: 2025-01-29 | Stop reason: HOSPADM

## 2025-01-27 RX ORDER — GUAIFENESIN 600 MG/1
600 TABLET, EXTENDED RELEASE ORAL 2 TIMES DAILY PRN
Status: DISCONTINUED | OUTPATIENT
Start: 2025-01-27 | End: 2025-01-29 | Stop reason: HOSPADM

## 2025-01-27 RX ADMIN — LEVOTHYROXINE SODIUM 50 MCG: 0.05 TABLET ORAL at 06:50

## 2025-01-27 RX ADMIN — FERROUS SULFATE TAB 325 MG (65 MG ELEMENTAL FE) 325 MG: 325 (65 FE) TAB at 09:07

## 2025-01-27 RX ADMIN — ASPIRIN 81 MG CHEWABLE TABLET 81 MG: 81 TABLET CHEWABLE at 09:06

## 2025-01-27 RX ADMIN — Medication 1 TABLET: at 09:07

## 2025-01-27 RX ADMIN — GUAIFENESIN 600 MG: 600 TABLET, EXTENDED RELEASE ORAL at 21:01

## 2025-01-27 RX ADMIN — LOVASTATIN 40 MG: 40 TABLET ORAL at 09:06

## 2025-01-27 RX ADMIN — Medication 2 L/MIN: at 09:07

## 2025-01-27 RX ADMIN — METHOCARBAMOL 500 MG: 500 TABLET ORAL at 21:02

## 2025-01-27 RX ADMIN — MAGNESIUM OXIDE TAB 400 MG (241.3 MG ELEMENTAL MG) 400 MG: 400 (241.3 MG) TAB at 09:07

## 2025-01-27 RX ADMIN — METHOCARBAMOL 500 MG: 500 TABLET ORAL at 14:32

## 2025-01-27 RX ADMIN — GABAPENTIN 100 MG: 100 CAPSULE ORAL at 09:07

## 2025-01-27 RX ADMIN — FUROSEMIDE 20 MG: 10 INJECTION, SOLUTION INTRAMUSCULAR; INTRAVENOUS at 09:06

## 2025-01-27 RX ADMIN — SODIUM CHLORIDE 15 MMOL: 9 INJECTION, SOLUTION INTRAVENOUS at 10:00

## 2025-01-27 RX ADMIN — FUROSEMIDE 20 MG: 10 INJECTION, SOLUTION INTRAMUSCULAR; INTRAVENOUS at 20:46

## 2025-01-27 RX ADMIN — GABAPENTIN 100 MG: 100 CAPSULE ORAL at 14:32

## 2025-01-27 RX ADMIN — LIDOCAINE 4% 1 PATCH: 40 PATCH TOPICAL at 14:32

## 2025-01-27 RX ADMIN — GABAPENTIN 100 MG: 100 CAPSULE ORAL at 20:46

## 2025-01-27 RX ADMIN — MUPIROCIN 1 APPLICATION: 20 OINTMENT TOPICAL at 20:46

## 2025-01-27 RX ADMIN — GUAIFENESIN 600 MG: 600 TABLET, EXTENDED RELEASE ORAL at 17:06

## 2025-01-27 RX ADMIN — ACETAMINOPHEN 650 MG: 325 TABLET, FILM COATED ORAL at 20:46

## 2025-01-27 RX ADMIN — ACETAMINOPHEN 650 MG: 325 TABLET, FILM COATED ORAL at 14:32

## 2025-01-27 RX ADMIN — CLOPIDOGREL BISULFATE 75 MG: 75 TABLET ORAL at 17:06

## 2025-01-27 RX ADMIN — ENOXAPARIN SODIUM 40 MG: 40 INJECTION SUBCUTANEOUS at 09:06

## 2025-01-27 RX ADMIN — METOPROLOL TARTRATE 12.5 MG: 25 TABLET, FILM COATED ORAL at 20:45

## 2025-01-27 RX ADMIN — ACETAMINOPHEN 650 MG: 325 TABLET, FILM COATED ORAL at 09:07

## 2025-01-27 RX ADMIN — METHOCARBAMOL 500 MG: 500 TABLET ORAL at 06:50

## 2025-01-27 RX ADMIN — MUPIROCIN 1 APPLICATION: 20 OINTMENT TOPICAL at 09:06

## 2025-01-27 RX ADMIN — METOPROLOL TARTRATE 12.5 MG: 25 TABLET, FILM COATED ORAL at 09:07

## 2025-01-27 ASSESSMENT — PAIN SCALES - GENERAL
PAINLEVEL_OUTOF10: 0 - NO PAIN

## 2025-01-27 ASSESSMENT — PAIN - FUNCTIONAL ASSESSMENT
PAIN_FUNCTIONAL_ASSESSMENT: 0-10

## 2025-01-27 ASSESSMENT — COGNITIVE AND FUNCTIONAL STATUS - GENERAL
DAILY ACTIVITIY SCORE: 17
MOVING FROM LYING ON BACK TO SITTING ON SIDE OF FLAT BED WITH BEDRAILS: A LITTLE
HELP NEEDED FOR BATHING: A LOT
DRESSING REGULAR LOWER BODY CLOTHING: A LOT
STANDING UP FROM CHAIR USING ARMS: A LITTLE
MOVING TO AND FROM BED TO CHAIR: A LITTLE
CLIMB 3 TO 5 STEPS WITH RAILING: A LITTLE
MOBILITY SCORE: 18
WALKING IN HOSPITAL ROOM: A LITTLE
TURNING FROM BACK TO SIDE WHILE IN FLAT BAD: A LITTLE
TOILETING: A LOT
DRESSING REGULAR UPPER BODY CLOTHING: A LITTLE

## 2025-01-27 ASSESSMENT — ENCOUNTER SYMPTOMS
DIAPHORESIS: 0
ENDOCRINE NEGATIVE: 1
GASTROINTESTINAL NEGATIVE: 1
PARESTHESIAS: 0
HEMATOLOGIC/LYMPHATIC NEGATIVE: 1
PSYCHIATRIC NEGATIVE: 1
SHORTNESS OF BREATH: 0
WHEEZING: 0
EYES NEGATIVE: 1
FEVER: 0
IRREGULAR HEARTBEAT: 0
NUMBNESS: 0
ABDOMINAL PAIN: 0
WEAKNESS: 1
BLOATING: 0
MUSCLE CRAMPS: 0
DOUBLE VISION: 0
BLURRED VISION: 0
COUGH: 0

## 2025-01-27 ASSESSMENT — ACTIVITIES OF DAILY LIVING (ADL): ADL_ASSISTANCE: INDEPENDENT

## 2025-01-27 NOTE — NURSING NOTE
01/27/25 3030 Patient Navigator  I introduced myself to the patient and explained my role. I reviewed her A1C 5.8 value and what that indicates. I reviewed healthy food options and the handouts listed below. I informed her regarding the ADA recommendations to exercise 30 minutes 5 days a week. I stressed to only implement exercise when medically cleared by her physician. I gave her my business card and instructed her to call/email as needed. She appreciated my visit and verbalized understanding of the above note. I have updated the patient's RN, Kelly, of my visit.    Handouts:  What can I eat? American Diabetes Association  How do I follow a healthy diet pattern? American Heart Association    Maida BELLO, RN  Patient Navigator  Diabetes Care &   Stroke Educator

## 2025-01-27 NOTE — PROGRESS NOTES
This TCC reviewed chart, awaiting PT/OT notes.  Patient working with Therapy when this TCC was in the heart center.  Care transitions to continue to follow.

## 2025-01-27 NOTE — PROGRESS NOTES
Cardiology Progress    Impression:  Unstable angina.    Postop CABG x 4: LIMA-LAD, radial-RCA, SVG-OM, SVG-D2  Hypertension  Uncontrolled hyperlipidemia.  Intolerant to multiple statins.  Obesity  Carotid disease.  Bilateral carotid endarterectomy  Plan:  Continue medicines as ordered.  Add Plavix when okay with CT surgery  [id number] at discharge  Ambulate  HPI:  Continues to improve.  Remains sinus rhythm.  Meds:  Scheduled medications  acetaminophen, 650 mg, oral, q6h  aspirin, 81 mg, oral, Daily  enoxaparin, 40 mg, subcutaneous, Daily  ferrous sulfate (325 mg ferrous sulfate), 65 mg of iron, oral, Daily with breakfast  furosemide, 20 mg, intravenous, BID  gabapentin, 100 mg, oral, TID  insulin lispro, 0-15 Units, subcutaneous, Before meals & nightly  levothyroxine, 50 mcg, oral, Daily  lidocaine, 1 patch, transdermal, q24h  lovastatin, 40 mg, oral, Daily  magnesium oxide, 400 mg, oral, Daily  methocarbamol, 500 mg, oral, q8h CHRIS  metoprolol tartrate, 12.5 mg, oral, BID  multivitamin with minerals, 1 tablet, oral, Daily  mupirocin, 1 Application, Each Nostril, BID  oxygen, , inhalation, Continuous - Inhalation  polyethylene glycol, 17 g, oral, Daily  sennosides-docusate sodium, 2 tablet, oral, BID      Continuous medications     PRN medications  PRN medications: fentaNYL, magnesium sulfate, magnesium sulfate, melatonin, metoclopramide **OR** metoclopramide, naloxone, ondansetron ODT **OR** ondansetron, oxyCODONE, oxyCODONE, potassium chloride CR **OR** potassium chloride, potassium chloride CR **OR** potassium chloride    Physical exam:  Vitals:    01/27/25 0800   BP:    Pulse: 88   Resp:    Temp:    SpO2: 99%      No JVD.  Decreased breath sounds.  No edema.  EKG:  Telemetry shows sinus rhythm.  Echo:  Normal EF.  Moderate MR.  Labs:  Lab Results   Component Value Date    WBC 8.0 01/27/2025    HGB 8.3 (L) 01/27/2025    HCT 25.6 (L) 01/27/2025     01/27/2025    CHOL 398 (H) 01/20/2025    TRIG 253 (H)  01/20/2025    HDL 62.3 01/20/2025    ALT 16 01/20/2025    AST 17 01/20/2025     01/27/2025    K 4.1 01/27/2025     01/27/2025    CREATININE 0.88 01/27/2025    BUN 16 01/27/2025    CO2 30 01/27/2025    TSH 7.90 (H) 01/20/2025    INR 1.4 (H) 01/24/2025    HGBA1C 5.8 (H) 01/20/2025     par

## 2025-01-27 NOTE — CARE PLAN
Problem: Discharge Planning  Goal: Discharge to home or other facility with appropriate resources  Outcome: Not Progressing     Problem: Skin  Goal: Promote/optimize nutrition  Outcome: Progressing     Problem: Pain  Goal: Takes deep breaths with improved pain control throughout the shift  Outcome: Progressing  Goal: Turns in bed with improved pain control throughout the shift  Outcome: Progressing  Goal: Walks with improved pain control throughout the shift  Outcome: Progressing  Goal: Performs ADL's with improved pain control throughout shift  Outcome: Progressing  Goal: Participates in PT with improved pain control throughout the shift  Outcome: Progressing  Goal: Free from opioid side effects throughout the shift  Outcome: Progressing  Goal: Free from acute confusion related to pain meds throughout the shift  Outcome: Progressing     Problem: Pain - Adult  Goal: Verbalizes/displays adequate comfort level or baseline comfort level  Outcome: Progressing     Problem: Safety - Adult  Goal: Free from fall injury  Outcome: Progressing     Problem: Chronic Conditions and Co-morbidities  Goal: Patient's chronic conditions and co-morbidity symptoms are monitored and maintained or improved  Outcome: Progressing     Problem: Nutrition  Goal: Nutrient intake appropriate for maintaining nutritional needs  Outcome: Progressing     Problem: Resident is recovering from cardiovascular surgery  Goal: I will maintain and build strength.  Outcome: Progressing  Goal: I will decrease complications and risks after surgery  Outcome: Progressing     Problem: Respiratory  Goal: Clear secretions with interventions this shift  Outcome: Progressing  Goal: Minimize anxiety/maximize coping throughout shift  Outcome: Progressing  Goal: Minimal/no exertional discomfort or dyspnea this shift  Outcome: Progressing  Goal: No signs of respiratory distress (eg. Use of accessory muscles. Peds grunting)  Outcome: Progressing  Goal: Patent airway  maintained this shift  Outcome: Progressing  Goal: Tolerate mechanical ventilation evidenced by VS/agitation level this shift  Outcome: Progressing  Goal: Tolerate pulmonary toileting this shift  Outcome: Progressing  Goal: Verbalize decreased shortness of breath this shift  Outcome: Progressing  Goal: Wean oxygen to maintain O2 saturation per order/standard this shift  Outcome: Progressing  Goal: Increase self care and/or family involvement in next 24 hours  Outcome: Progressing     Problem: Meds/Post-op Pain  Goal: Pain controlled to tolerate pain level  Outcome: Progressing  Goal: Tolerates prescribed medication  Outcome: Progressing     Problem: DVT/VTE Prevention/Activity  Goal: No decrease in circulation/sensation  Outcome: Progressing  Goal: Prevent skin breakdown  Outcome: Progressing  Goal: Return to preop oxygenation status  Outcome: Progressing  Goal: Tolerates optimal activity  Outcome: Progressing  Goal: Increase self care and/or family involvement in 24 hrs.  Outcome: Progressing     Problem: Wound care/infection prevention  Goal: No signs of infection in 24 hrs.  Outcome: Progressing  Goal: No unexpected bleeding from incision this shift  Outcome: Progressing     Problem: Diet/fluid balance  Goal: Adequate urinary output  Outcome: Progressing  Goal: Free from nausea/vomiting  Outcome: Progressing  Goal: Return in bowel function  Outcome: Progressing  Goal: Tolerates prescribed diet  Outcome: Progressing     Problem: Other goals  Goal: No change in neurological status  Outcome: Progressing  Goal: Stabilize vital signs (return to 10% of baseline)  Outcome: Progressing     Problem: Fall/Injury  Goal: Not fall by end of shift  Outcome: Progressing  Goal: Be free from injury by end of the shift  Outcome: Progressing  Goal: Verbalize understanding of personal risk factors for fall in the hospital  Outcome: Progressing  Goal: Verbalize understanding of risk factor reduction measures to prevent injury from  fall in the home  Outcome: Progressing  Goal: Use assistive devices by end of the shift  Outcome: Progressing  Goal: Pace activities to prevent fatigue by end of the shift  Outcome: Progressing     Problem: Safety - Medical Restraint  Goal: Remains free of injury from restraints (Restraint for Interference with Medical Device)  Outcome: Met  Goal: Free from restraint(s) (Restraint for Interference with Medical Device)  Outcome: Met       The clinical goals for the shift include Remain hemodynamically stable.    Over the shift, the patient met the goal.    Problem: Discharge Planning  Goal: Discharge to home or other facility with appropriate resources  Outcome: Not Progressing

## 2025-01-27 NOTE — CARE PLAN
The patient's goals for the shift include      The clinical goals for the shift include pt will remain hemodybamically stable      Problem: Skin  Goal: Promote/optimize nutrition  1/27/2025 0926 by Kelly Minaya RN  Outcome: Progressing  1/27/2025 0926 by Kelly Minaya RN  Outcome: Progressing     Problem: Pain  Goal: Takes deep breaths with improved pain control throughout the shift  1/27/2025 0926 by Kelly Minaya RN  Outcome: Progressing  1/27/2025 0926 by Kelly Minaya RN  Outcome: Progressing  Goal: Turns in bed with improved pain control throughout the shift  1/27/2025 0926 by Kelly Minaya RN  Outcome: Progressing  1/27/2025 0926 by Kelly Minaya RN  Outcome: Progressing  Goal: Walks with improved pain control throughout the shift  1/27/2025 0926 by Kelly Minaya RN  Outcome: Progressing  1/27/2025 0926 by Kelly Minaya RN  Outcome: Progressing  Goal: Performs ADL's with improved pain control throughout shift  1/27/2025 0926 by Kelly Minaya RN  Outcome: Progressing  1/27/2025 0926 by Kelly Minaya RN  Outcome: Progressing  Goal: Participates in PT with improved pain control throughout the shift  1/27/2025 0926 by Kelly Minaya RN  Outcome: Progressing  1/27/2025 0926 by Kelly Minaya RN  Outcome: Progressing  Goal: Free from opioid side effects throughout the shift  1/27/2025 0926 by Kelly Minaya RN  Outcome: Progressing  1/27/2025 0926 by Kelly Minaya RN  Outcome: Progressing  Goal: Free from acute confusion related to pain meds throughout the shift  1/27/2025 0926 by Kelly Minaya RN  Outcome: Progressing  1/27/2025 0926 by Kelly Minaya RN  Outcome: Progressing     Problem: Pain - Adult  Goal: Verbalizes/displays adequate comfort level or baseline comfort level  1/27/2025 0926 by Kelly Minaya RN  Outcome: Progressing  1/27/2025 0926 by Kelly Minaya RN  Outcome: Progressing     Problem: Safety - Adult  Goal: Free from fall injury  1/27/2025 0926 by Kelly Minaya RN  Outcome:  Progressing  1/27/2025 0926 by Kelly Minaya RN  Outcome: Progressing     Problem: Discharge Planning  Goal: Discharge to home or other facility with appropriate resources  1/27/2025 0926 by Kelly Minaya RN  Outcome: Progressing  1/27/2025 0926 by Kelly Minaya RN  Outcome: Progressing     Problem: Chronic Conditions and Co-morbidities  Goal: Patient's chronic conditions and co-morbidity symptoms are monitored and maintained or improved  1/27/2025 0926 by Kelly Minaya RN  Outcome: Progressing  1/27/2025 0926 by Kelly Minaya RN  Outcome: Progressing     Problem: Nutrition  Goal: Nutrient intake appropriate for maintaining nutritional needs  1/27/2025 0926 by Kelly Minaya RN  Outcome: Progressing  1/27/2025 0926 by Kelly Minaya RN  Outcome: Progressing     Problem: Resident is recovering from cardiovascular surgery  Goal: I will maintain and build strength.  1/27/2025 0926 by Kelly Minaya RN  Outcome: Progressing  1/27/2025 0926 by Kelly Minaya RN  Outcome: Progressing  Goal: I will decrease complications and risks after surgery  1/27/2025 0926 by Kelly Minaya RN  Outcome: Progressing  1/27/2025 0926 by Kelly Minaya RN  Outcome: Progressing     Problem: Respiratory  Goal: Clear secretions with interventions this shift  1/27/2025 0926 by Kelly Minaya RN  Outcome: Progressing  1/27/2025 0926 by Kelly Minaya RN  Outcome: Progressing  Goal: Minimize anxiety/maximize coping throughout shift  1/27/2025 0926 by Kelly Minaya RN  Outcome: Progressing  1/27/2025 0926 by Kelly Minaya RN  Outcome: Progressing  Goal: Minimal/no exertional discomfort or dyspnea this shift  1/27/2025 0926 by Kelly Minaya RN  Outcome: Progressing  1/27/2025 0926 by Kelly Minaya RN  Outcome: Progressing  Goal: No signs of respiratory distress (eg. Use of accessory muscles. Peds grunting)  1/27/2025 0926 by Kelly Minaya RN  Outcome: Progressing  1/27/2025 0926 by Kelly Minaya RN  Outcome: Progressing  Goal: Patent  airway maintained this shift  1/27/2025 0926 by Kelly Minaya RN  Outcome: Progressing  1/27/2025 0926 by Kelly Minaya RN  Outcome: Progressing  Goal: Tolerate mechanical ventilation evidenced by VS/agitation level this shift  1/27/2025 0926 by Kelly Minaya RN  Outcome: Progressing  1/27/2025 0926 by Kelly Minaya RN  Outcome: Progressing  Goal: Tolerate pulmonary toileting this shift  1/27/2025 0926 by Kelly Minaya RN  Outcome: Progressing  1/27/2025 0926 by Kelly Minaya RN  Outcome: Progressing  Goal: Verbalize decreased shortness of breath this shift  1/27/2025 0926 by Kelly Minaya RN  Outcome: Progressing  1/27/2025 0926 by Kelly Minaya RN  Outcome: Progressing  Goal: Wean oxygen to maintain O2 saturation per order/standard this shift  1/27/2025 0926 by Kelly Minaya RN  Outcome: Progressing  1/27/2025 0926 by Kelly Minaya RN  Outcome: Progressing  Goal: Increase self care and/or family involvement in next 24 hours  1/27/2025 0926 by Kelly Minaya RN  Outcome: Progressing  1/27/2025 0926 by Kelly Minaya RN  Outcome: Progressing     Problem: Meds/Post-op Pain  Goal: Pain controlled to tolerate pain level  1/27/2025 0926 by Kelly Minaya RN  Outcome: Progressing  1/27/2025 0926 by Kelly Minaya RN  Outcome: Progressing  Goal: Tolerates prescribed medication  1/27/2025 0926 by Kelly Minaya RN  Outcome: Progressing  1/27/2025 0926 by Kelly Minaya RN  Outcome: Progressing     Problem: DVT/VTE Prevention/Activity  Goal: No decrease in circulation/sensation  1/27/2025 0926 by Kelly Minaya RN  Outcome: Progressing  1/27/2025 0926 by Kelly Minaya RN  Outcome: Progressing  Goal: Prevent skin breakdown  1/27/2025 0926 by Kelly Minaya RN  Outcome: Progressing  1/27/2025 0926 by Kelly Minaya RN  Outcome: Progressing  Goal: Return to preop oxygenation status  1/27/2025 0926 by Kelly Minaya RN  Outcome: Progressing  1/27/2025 0926 by Kelly Minaya RN  Outcome: Progressing  Goal: Tolerates  optimal activity  1/27/2025 0926 by Kelly Minaya RN  Outcome: Progressing  1/27/2025 0926 by Kelly Minaya RN  Outcome: Progressing  Goal: Increase self care and/or family involvement in 24 hrs.  1/27/2025 0926 by Kelly Minaya RN  Outcome: Progressing  1/27/2025 0926 by Kelly Minaya RN  Outcome: Progressing     Problem: Wound care/infection prevention  Goal: No signs of infection in 24 hrs.  1/27/2025 0926 by Kelly Minaya RN  Outcome: Progressing  1/27/2025 0926 by Kelly Minaya RN  Outcome: Progressing  Goal: No unexpected bleeding from incision this shift  1/27/2025 0926 by Kelly Minaya RN  Outcome: Progressing  1/27/2025 0926 by Kelly Minaya RN  Outcome: Progressing     Problem: Diet/fluid balance  Goal: Adequate urinary output  1/27/2025 0926 by Kelly Minaya RN  Outcome: Progressing  1/27/2025 0926 by Kelly Minaya RN  Outcome: Progressing  Goal: Free from nausea/vomiting  1/27/2025 0926 by Kelly Minaya RN  Outcome: Progressing  1/27/2025 0926 by Kelly Minaya RN  Outcome: Progressing  Goal: Return in bowel function  1/27/2025 0926 by Kelly Minyaa RN  Outcome: Progressing  1/27/2025 0926 by Kelly Minaya RN  Outcome: Progressing  Goal: Tolerates prescribed diet  1/27/2025 0926 by Kelly Minaya RN  Outcome: Progressing  1/27/2025 0926 by Kelly Minaya RN  Outcome: Progressing     Problem: Other goals  Goal: No change in neurological status  1/27/2025 0926 by Kelly Minaya RN  Outcome: Progressing  1/27/2025 0926 by Kelly Minaya RN  Outcome: Progressing  Goal: Stabilize vital signs (return to 10% of baseline)  1/27/2025 0926 by Kelly Minaya RN  Outcome: Progressing  1/27/2025 0926 by Kelly Minaya RN  Outcome: Progressing     Problem: Fall/Injury  Goal: Not fall by end of shift  1/27/2025 0926 by Kelly Minaya RN  Outcome: Progressing  1/27/2025 0926 by Kelly Minaya RN  Outcome: Progressing  Goal: Be free from injury by end of the shift  1/27/2025 0926 by Kelly Minaya  RN  Outcome: Progressing  1/27/2025 0926 by Kelly Minaya RN  Outcome: Progressing  Goal: Verbalize understanding of personal risk factors for fall in the hospital  1/27/2025 0926 by Kelly Minaya RN  Outcome: Progressing  1/27/2025 0926 by Kelly Minaya RN  Outcome: Progressing  Goal: Verbalize understanding of risk factor reduction measures to prevent injury from fall in the home  1/27/2025 0926 by Kelly Minaya RN  Outcome: Progressing  1/27/2025 0926 by Kelly Minaya RN  Outcome: Progressing  Goal: Use assistive devices by end of the shift  1/27/2025 0926 by Kelly Minaya RN  Outcome: Progressing  1/27/2025 0926 by Kelly Minaya RN  Outcome: Progressing  Goal: Pace activities to prevent fatigue by end of the shift  1/27/2025 0926 by Kelly Minaya RN  Outcome: Progressing  1/27/2025 0926 by Kelly Minaya RN  Outcome: Progressing

## 2025-01-27 NOTE — PROGRESS NOTES
[facility]   Cardiothoracic Surgery   Progress Note        Jessy Garcia is a 75 y.o. female on day 6 of admission presenting with Chest pain, unspecified type.    Subjective     Interval HPI: Seen and assessed patient this morning while she was laying in bed. In no acute distress and without significant complaints.       Review of Systems   Constitutional: Positive for malaise/fatigue. Negative for diaphoresis and fever.   HENT: Negative.  Negative for congestion.    Eyes: Negative.  Negative for blurred vision and double vision.   Cardiovascular:  Negative for chest pain and irregular heartbeat.   Respiratory:  Negative for cough, shortness of breath and wheezing.    Endocrine: Negative.    Hematologic/Lymphatic: Negative.    Skin: Negative.    Musculoskeletal:  Positive for muscle weakness. Negative for muscle cramps.   Gastrointestinal: Negative.  Negative for bloating and abdominal pain.   Genitourinary: Negative.    Neurological:  Positive for weakness. Negative for numbness and paresthesias.   Psychiatric/Behavioral: Negative.           Objective   Physical Exam  Physical Exam  Vitals and nursing note reviewed.   Constitutional:       General: She is awake. She is not in acute distress.     Appearance: Normal appearance.   HENT:      Head: Normocephalic and atraumatic.   Eyes:      Extraocular Movements: Extraocular movements intact.      Pupils: Pupils are equal, round, and reactive to light.   Cardiovascular:      Rate and Rhythm: Normal rate and regular rhythm.      Pulses: Normal pulses.      Heart sounds: Normal heart sounds, S1 normal and S2 normal. No murmur heard.     No friction rub. No gallop.   Pulmonary:      Effort: Pulmonary effort is normal.      Breath sounds: Examination of the right-lower field reveals decreased breath sounds. Examination of the left-lower field reveals decreased breath sounds. Decreased breath sounds present.   Chest:      Comments:  Midsternal incision well approximated, dry, and intact  Abdominal:      General: Bowel sounds are normal.      Palpations: Abdomen is soft.      Tenderness: There is no abdominal tenderness.   Musculoskeletal:      Cervical back: Normal range of motion and neck supple.   Skin:     General: Skin is warm and dry.      Capillary Refill: Capillary refill takes less than 2 seconds.   Neurological:      Mental Status: She is alert and oriented to person, place, and time.      GCS: GCS eye subscore is 4. GCS verbal subscore is 5. GCS motor subscore is 6.      Cranial Nerves: Cranial nerves 2-12 are intact.      Sensory: Sensation is intact.      Motor: Motor function is intact.      Coordination: Coordination is intact.      Gait: Gait is intact.   Psychiatric:         Attention and Perception: Attention and perception normal.         Mood and Affect: Mood and affect normal.         Speech: Speech normal.         Behavior: Behavior normal. Behavior is cooperative.         Cognition and Memory: Cognition normal.         Last Recorded Vitals  Vitals:    01/27/25 1100 01/27/25 1200 01/27/25 1221 01/27/25 1300   BP: 117/63 101/61  95/53   BP Location:       Patient Position:       Pulse: 80 68  72   Resp: 18 21  25   Temp:  36 °C (96.8 °F)     TempSrc:  Temporal     SpO2: 97%      Weight:   93 kg (205 lb 0.4 oz)    Height:   1.524 m (5')         Intake/Output last 3 Shifts:  I/O last 3 completed shifts:  In: 830 (8.9 mL/kg) [P.O.:820; I.V.:10 (0.1 mL/kg)]  Out: 2320 (24.9 mL/kg) [Urine:2320 (0.7 mL/kg/hr)]  Weight: 93 kg     Inpatient Medications  acetaminophen, 650 mg, oral, q6h  aspirin, 81 mg, oral, Daily  enoxaparin, 40 mg, subcutaneous, Daily  ferrous sulfate (325 mg ferrous sulfate), 65 mg of iron, oral, Daily with breakfast  furosemide, 20 mg, intravenous, BID  gabapentin, 100 mg, oral, TID  insulin lispro, 0-15 Units, subcutaneous, Before meals & nightly  levothyroxine, 50 mcg, oral, Daily  lidocaine, 1 patch,  transdermal, q24h  lovastatin, 40 mg, oral, Daily  magnesium oxide, 400 mg, oral, Daily  methocarbamol, 500 mg, oral, q8h CHRIS  metoprolol tartrate, 12.5 mg, oral, BID  multivitamin with minerals, 1 tablet, oral, Daily  mupirocin, 1 Application, Each Nostril, BID  oxygen, , inhalation, Continuous - Inhalation  polyethylene glycol, 17 g, oral, Daily  sennosides-docusate sodium, 2 tablet, oral, BID  sodium phosphate, 15 mmol, intravenous, Once      Continuous medications       PRN medications  PRN medications: fentaNYL, magnesium sulfate, magnesium sulfate, melatonin, metoclopramide **OR** metoclopramide, naloxone, ondansetron ODT **OR** ondansetron, oxyCODONE, oxyCODONE, potassium chloride CR **OR** potassium chloride, potassium chloride CR **OR** potassium chloride     LDA:       Relevant Results  Lab Review  Results from last 7 days   Lab Units 01/27/25  0733   WBC AUTO x10*3/uL 8.0   HEMOGLOBIN g/dL 8.3*   HEMATOCRIT % 25.6*   PLATELETS AUTO x10*3/uL 170     Results from last 7 days   Lab Units 01/27/25  0733   SODIUM mmol/L 138   POTASSIUM mmol/L 4.1   CHLORIDE mmol/L 100   CO2 mmol/L 30   BUN mg/dL 16   CREATININE mg/dL 0.88   CALCIUM mg/dL 7.9*   GLUCOSE mg/dL 133*     Results from last 7 days   Lab Units 01/27/25  0733   MAGNESIUM mg/dL 2.07     Results from last 7 days   Lab Units 01/25/25  0712   POCT PH, ARTERIAL pH 7.39   POCT PCO2, ARTERIAL mm Hg 41   POCT PO2, ARTERIAL mm Hg 77*   POCT HCO3 CALCULATED, ARTERIAL mmol/L 24.8   POCT BASE EXCESS, ARTERIAL mmol/L -0.2         Radiographic Testing    Echo:  Transthoracic Echo (TTE) Complete 01/20/2025  PHYSICIAN INTERPRETATION:  Left Ventricle: Left ventricular ejection fraction is normal, by visual estimate at 55-60%. There are no regional left ventricular wall motion abnormalities. The left ventricular cavity size is normal. There is mildly increased septal and mildly increased posterior left ventricular wall thickness. There is left ventricular concentric  remodeling. Spectral Doppler shows a Grade I (impaired relaxation pattern) of left ventricular diastolic filling with normal left atrial filling pressure.  Left Atrium: The left atrium is normal in size.  Right Ventricle: The right ventricle is normal in size. There is normal right ventricular global systolic function.  Right Atrium: The right atrium is normal in size.  Aortic Valve: The aortic valve is trileaflet. There is mild aortic valve thickening. There is evidence of mildly elevated transaortic gradients consistent with sclerosis of the aortic valve. The aortic valve dimensionless index is 0.65. There is trace aortic valve regurgitation. The peak instantaneous gradient of the aortic valve is 12 mmHg. The mean gradient of the aortic valve is 8 mmHg.  Mitral Valve: The mitral valve is mildly thickened. The peak instantaneous gradient of the mitral valve is 8 mmHg. There is moderate mitral valve regurgitation. The mitral regurgitant orifice area is 7 mm2. The mitral regurgitant volume is 15.75 ml.  Tricuspid Valve: The tricuspid valve is structurally normal. There is trace tricuspid regurgitation.  Pulmonic Valve: The pulmonic valve is structurally normal. There is trace pulmonic valve regurgitation.  Pericardium: There is no pericardial effusion noted.  Aorta: The aortic root is normal.  Systemic Veins: The inferior vena cava appears normal in size.  In comparison to the previous echocardiogram(s): Compared with study dated 5/16/2011,.        CONCLUSIONS:   1. Left ventricular ejection fraction is normal, by visual estimate at 55-60%.   2. Spectral Doppler shows a Grade I (impaired relaxation pattern) of left ventricular diastolic filling with normal left atrial filling pressure.   3. There is normal right ventricular global systolic function.   4. Moderate mitral valve regurgitation.   5. Aortic valve sclerosis.    Ejection Fractions:  EF   Date/Time Value Ref Range Status   01/24/2025 07:33 AM 58 %     01/20/2025 02:15 PM 58 %      Cath:  Cardiac Catheterization Procedure 01/21/2025  Coronary Angiography Comments:  64-year-old woman with a history of bilateral carotid endarterectomy, uncontrolled hypertension, uncontrolled hyperlipidemia presents with unstable angina. Urgent heart cath recommended.     Fluoroscopy demonstrated moderate calcification of the coronary tree.     Left main: Short vessel. No significant disease.  LAD: Large vessel. Tortuous and calcified. In the mid LAD there is a complex 80 to 90% stenosis involving a large diagonal and moderate-sized septal. The remainder the LAD has moderate disease.  Circumflex: Moderate size vessel. Proximal 80% stenosis. Remainder the vessel is free of disease.  RCA: Dominant vessel. Ostial 90% stenosis.  LVEDP 21 mmHg. Left atrial angiography not performed.    Imaging:  XR chest 1 view   Final Result   1. Slightly improved layering left-sided pleural effusions   surrounding atelectasis. Stable right basal lung atelectasis        Signed by: Rony Powell 1/27/2025 7:24 AM   Dictation workstation:   [id number]      XR chest 1 view   Final Result   Left-sided airspace consolidation, as above. Clinical correlation and   continued follow-up until clearing is recommended        MACRO:   None.        Signed by: Gerardo Oneill 1/26/2025 11:50 AM   Dictation workstation:   [id number]      XR abdomen 1 view   Final Result   Interval decrease in previously seen gaseous distension.        Nonspecific nonobstructive bowel gas pattern.        MACRO:   None        Signed by: Clayton Cantu 1/25/2025 11:38 PM   Dictation workstation:   [id number]      XR abdomen 1 view   Final Result   1. Gas distended stomach, otherwise nonobstructive gaseous pattern.        MACRO:   None        Signed by: Rony Powell 1/25/2025 7:01 AM   Dictation workstation:   [id number]      XR chest 1 view   Final Result   1. No signal interval change.        Signed by: Rony Powell 1/25/2025 6:28 AM    Dictation workstation:   [id number]      XR chest 1 view   Final Result   1.  Postsurgical changes with medical devices as detailed. Possible   left pleural effusion and bilateral lower lobe airspace opacities   including a left retrocardiac opacity which could be related to   atelectasis or infection, similar to prior imaging.                  MACRO:   None        Signed by: Mariposa Garcia 1/24/2025 11:16 PM   Dictation workstation:   [id number]      XR chest 1 view   Final Result   1. Postoperative changes, as above.   2. Diffuse interstitial infiltrates bilaterally, as above. Clinical   correlation and continued follow-up until clearing is recommended.        MACRO:   None.        Signed by: Gerardo Oneill 1/24/2025 3:25 PM   Dictation workstation:   [id number]      Transesophageal Echo (BAKARI)   Final Result      Vascular US upper extremity arterial duplex bilateral   Final Result      Vascular US lower extremity vein mapping bilateral   Final Result      Vascular US carotid artery duplex bilateral   Final Result      Vascular US palmar arch evaluation   Final Result      Cardiac Catheterization Procedure   Final Result      Transthoracic Echo (TTE) Complete   Final Result      CT angio chest for pulmonary embolism   Final Result   1.No CT evidence of pulmonary embolism.   2.Normal aorta.   3.Small pulmonary nodule in the right upper lobe measuring 0.4   cm. As an isolated phenomena likely of little significance but   correlate with any malignancy history.   4.No evidence of pulmonary artery filling defect to suggest a   pulmonary embolism.   5.No evidence of aortic dissection or aneurysmal dilatation.   6.Small distal esophageal hiatal hernia.   7.Atherosclerotic changes of the abdominal aorta and its   branches. There is some narrowing of the proximal celiac axis   estimated be approximately 50-60%.   Signed by Albino Ritchie MD      XR chest 1 view   Final Result   No acute process.   Signed by Jo  "MD Eda                History of Present Illness: Jessy Garcia is a 75 y.o. female with a PMH significant for HTN, HLD, hypothyroidism, carotid stenosis s/p bilateral CEA, obesity who presented to [facility] on 1/20/2025 with NSTEMI. Patient reports the pain initially began approximately 4 days ago after shoveling snow. She reports the pain radiates across her chest she describes the pain as a \"hurt\"; severity 8/10 at its worst. She does report the pain is more noticeable with minimal exertion but also states the pain occurs at rest. She does report some associated shortness of breath, she denies any nausea, vomiting, diaphoresis. She denies any recent fevers chills, abdominal pain, headache, dizziness. She reports she is intermittently compliant with her blood pressure and cholesterol medications.      ED course: significant for HS tropnin (36), D-dimer 717 with CT PE negative for PE.     LHC performed 1/21 by Dr. Palacio and she was found to have triple vessel disease. Cardiac surgery was consulted for CABG evaluation.     Daily Events    1/22/25: No acute events overnight; currently working on staging an operative date. Continue inpatient admission given NSTEMI.      1/23/25: Continues with no acute anginal complaints overnight. Plan for patient to undergo bypass grafting tomorrow on 1/24/25 with Dr. Deyvi Gonzalez.     1/24/25: No acute events overnight; plan for OR today with Dr. Deyvi Gonzalez.     1/24/25 (Post-op): Patient arrived to the ICU in critically ill but stable condition. Currently intubate & sedated without need for vasopressors/dilators. Plan to reverse paralytic therapy and wean mechanical ventilation towards extubation.   - Vital Signs: HR 63, /59 (82), SpO2 94%   - Hemodynamics: CVP 9, CO 3.8, CI 2.1, SVR 1367  - Ventilator Settings: PRVC-AC / FiO2 50% / PEEP 5 / RR 16   - Pump time: 90 minutes   Intake & Output:   - Initial R Pleural Chest Tube: 140mL  - " Initial L Pleural Chest Tube: 20mL  - Initial Mediastinal Chest Tube: 40mL    1/25/2025:  Patient is POD#1 s/p CABGx4. She was extubated this morning to 6L NC. Currently remains on 0.02 of levo, will give albumin in hopes to wean off vasoactive gtts. L pleural chest tube: 50mL, R pleural chest tube: 330mL, Mediastinal chest tube: 95mL. Will get oob and into chair this morning. Likely will remove all chest tubes later today after ambulation.     - Current O2 Requirements: 6L NC     1/26/2025: No acute events overnight; patient has remained off norepinephrine. Currently weaning down on supplemental O2. Increasing diuresis and focusing on progressive mobility.     - Current O2 Requirements: 4L NC     1/27/2025: No acute events overnight. Continue to diurese with furosemide BID. Continue to wean supplemental O2.     - Current O2 Requirements: 2L NC     Assessment & Plan         Multivessel Coronary Artery Disease  - Patient admitted with [facility] on 1/20/25  --> [facility] on 1/21/25 revealed diffuse multivessel disease including the mid LAD, prox-Lcx and ostial RCA  - S/p CABG x 4 on 1/24/25 with Dr. Deyvi Gonzalez   --> LIMA-LAD, SVG-Diag, SVG-OM, RA-RCA  - Continue on ASA 81mg and Lovastatin 40mg   - Continue on metoprolol tartrate 12.5mg BID   ---> Advance as tolerated        Radial Grafting  - S/p radial harvest site  - Consider addition of nitrates as indicated        Mediastinal Incision  - PERRY, well approximated, C/D/I        Acute Post-Op Pain  - As expected   - Multimodal pain control   --> Scheduled: Acetaminophen, magnesium oxide 400mg QD, gabapentin 100mg TID, methocarbamol 500mg q6h   --> PRN: oxycodone & fentanyl  - Bowel regimen while taking narcotics        Risk for Post-Op Arrhythmia  - Ventricular wires placed  - Discontinue V-wires prior to DC  - Telemetry until discharged       Acute Postoperative Respiratory Insufficiency   - As expected following CABG with post-op atelectasis  - Current O2 Requirements: 2L NC    - Coughing and deep breathing exercises with Incentive spirometry  - Out of bed, early and aggressive mobilization with assistance  - Oxygen as needed, wean to keep saturation above 92%  - ICU intensivist/pulmonologist consulted  - Albuterol nebulizers as needed       Risk for Fluid Volume Overload  - Pre-Op Weight: 88.6 kg      1/25: 93kg --> Plan to start low dose diuresis today     1/26: 93.3 kg (net negative 319mL) --> IV furosemide 20mg BID      1/27: 93kg (net negative 600mL) --> continue furosemide 20mg BID  - Strict I& Os and daily weights    - Monitor hemodynamics        Acute Post-Op Blood-Loss Anemia  - Pre-Op H/H:  14/44.4  --> Immediate post-op: 9.9/29.8     1/25: 9.6/28.1     1/26: 8.1/25.2     1/27: 8.3/25.6  - Monitor h/h in the initial post op period  - Multivitamin/iron tablet   - Daily CBC while in hospital       Post-Op Leukocytosis  - Pre-Op WBC: 7.3  --> Immediate post-op: 14.5     1/25: 9.6     1/26: 11.1     1/27: 8  - Afebrile, consider elevations as reactive to surgery   - Post-op ATB Q12 for 48hrs  - Daily cbc while in hospital        Dyslipidemia  - Home Medications: Lovastatin 40mg   - Continued        Hypothyroidism   - Home Medications: Levothyroxine 50 mcg  - Continued        Risk for Post-op Hyperglycemia  - Home Medications: None  - HbA1c: 5.8  - Goal for BG <150 in the post-operative period  - SSI       Risk for Electrolyte Disturbances  - Optimize electrolytes per [facility] protocol      Bowel Regimen  - Senna-S BID, miralax daily  - PRN suppositories     Prophylaxis  - GI: PPI  - DVT: Duy-Hose & enoxaparin   - MRSA: Negative  - PT/OT     Disposition  - CVICU (stepdown status)     Above patient seen and plan discussed with Dr. Deyvi Gonzalez, Plan as above.     Marilyn Huerta, APRN-CNP

## 2025-01-27 NOTE — PROGRESS NOTES
Subjective   Jessy Garcia is a 75 y.o. female who presents with Hypertension and chest discomfort.    Patient seen examined at bedside this morning, no acute overnight events, no new complaints.    Objective   Vitals:    01/27/25 1200   BP: 101/61   Pulse: 68   Resp: 21   Temp:    SpO2:       Physical Exam  Physical Exam:  Constitutional: well developed, awake, alert, no acute distress  ENMT: mucous membranes moist, EOMI, conjunctivae clear  Head/Neck: normocephalic, atraumatic; supple, trachea midline  Respiratory/Thorax: patent airways, CTAB; no wheezes, rales, or rhonchi  Cardiovascular: RRR, no murmur  Gastrointestinal: soft, nondistended, non-tender, bowel sounds appreciated  Extremities: palpable peripheral pulses, trace nonpitting edema  Neurological: AO x3, no focal deficits  Psychological: appropriate mood and behavior  Skin: warm and dry    Assessment/Plan     Jessy Garcia is a 75 year old female with PMH of HTN, HLD, hypothyroidism, carotid stenosis s/p bilateral CEA, obesity who presented to [facility] 1/20/2025 with NSTEMI. LHC performed 1/21 by Dr. Palacio and was found to have triple vessel disease. S/p coronary artery bypass x 4 (LIMA to LAD, Radial artery to RCA, SVG to OM, SVG to diagonal 2).     Neuro:  - Patient orientated to person place and time  - Analgesia: Gabapentin, PRN Tylenol, Oxycodone, Fentanyl per CTS  - Maintain normal sleep/wake cycle  - Avoid oversedating patient     Cardiovascular  #s/p CABG x 4 (LIMA to LAD, Radial artery to RCA, SVG to OM, SVG to diagonal 2)  #Hx HTN  #Hx HLD  - Last TTE 1/20/25 showed EF 55-60%  - Patient with postop hypotension after CABG surgery, improved  - Maintain MAP >70, wean as tolerated.  - On asa, lovastatin, metoprolol 12.5 mg  - Diuresis per CTS. Keep Mg>2, K>4.     Pulmonary:  - Patient with postop respiratory insufficiency, improving  - Patient extubated 1/25/25  - Chest tubes remove 1/25/25  - Encourage incentive spirometer use   - Daily  CXR  - Currently on 2.5 L nasal cannula     GI:  - Diet: Regular  - Prophylaxis: PPI     Renal:   - No current issues  - Nava in place, strict I&O's  - Trend renal panel, replete electrolytes PRN     Endocrine:  #Hypothyroidism  - Continue home levothyroxine  - SSI, maintain BG between 140-180s     Heme/Onc:  - DVT PPX: Lovenox  - Postop anemia as expected  - Trend H&H     ID:  - Prophylactic antibiotics per CTS  - CXR no acute pulmonary process     Skin/MSK:  - No acute issues     ICU CHECK LIST:   Antimicrobials:   Oxygen: 2.5L NC  Feeding: advance per CTS  Fluids:   Analgesia: per CTS  Sedation:   Thromboprophylaxis: Lovenox  Ulcer prophylaxis: PPI  Glycemic control: SSI  Bowel care: polly-colase, metamucil  Indwelling catheters: nava catheter  Lines: PIV's     Code Status: FULL     This is a preliminary note written by the resident. Please wait for attending addendum for finalization of note and recommendations.    Dr. Mor Viera  Internal Medicine

## 2025-01-27 NOTE — PROGRESS NOTES
Occupational Therapy    Evaluation    Patient Name: Jessy Garcia  MRN: [id number]  Department: [facility]  Room: [facility]174A  Today's Date: 1/27/2025  Time Calculation  Start Time: 1002  Stop Time: 1016  Time Calculation (min): 14 min        Assessment:  End of Session Communication: Bedside nurse  End of Session Patient Position: Up in chair, Alarm off, not on at start of session     Plan:  Treatment Interventions: ADL retraining, Functional transfer training, Endurance training, Compensatory technique education  OT Frequency: 3 times per week  OT Discharge Recommendations: Low intensity level of continued care  OT - OK to Discharge: Yes (to next level of care when cleared by medical team)  Treatment Interventions: ADL retraining, Functional transfer training, Endurance training, Compensatory technique education    Subjective   Current Problem:  1. Chest pain, unspecified type  Transthoracic Echo (TTE) Complete    Transthoracic Echo (TTE) Complete      2. ACS (acute coronary syndrome) (Multi)  Case Request Cath Lab: Left Heart Cath    Case Request Cath Lab: Left Heart Cath    Cardiac Catheterization Procedure    Cardiac Catheterization Procedure      3. Other chest pain  Transthoracic Echo (TTE) Complete      4. Atherosclerotic heart disease of native coronary artery with unstable angina pectoris  Cardiac Catheterization Procedure    Vascular US carotid artery duplex bilateral    Vascular US lower extremity vein mapping bilateral    Vascular US upper extremity arterial duplex bilateral    Vascular US carotid artery duplex bilateral    Vascular US lower extremity vein mapping bilateral    Vascular US upper extremity arterial duplex bilateral    Vascular US palmar arch evaluation    Vascular US palmar arch evaluation    Transesophageal Echo (BAKARI)    Transesophageal Echo (BAKAIR)    CANCELED: Transesophageal Echo (BAKARI)    CANCELED: Transesophageal Echo (BAKARI)    CANCELED: Transesophageal Echo (BAKARI)    CANCELED:  Transesophageal Echo (BAKARI)      5. Encounter for other preprocedural examination  Vascular US palmar arch evaluation      6. Occlusion and stenosis of bilateral carotid arteries  Vascular US carotid artery duplex bilateral      7. 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 Encounter for preprocedural cardiovascular examination  Vascular US lower extremity vein mapping bilateral      9. NSTEMI (non-ST elevated myocardial infarction) (Multi)  Case Request Operating Room: CABG, 3-4 VESSELS    Case Request Operating Room: CABG, 3-4 VESSELS      10. Coronary atherosclerosis due to calcified coronary lesion (CODE)  Transesophageal Echo (BAKARI)    Transesophageal Echo (BAKARI)    CANCELED: Transesophageal Echo (BAKARI)    CANCELED: Transesophageal Echo (BAKARI)    CANCELED: Transesophageal Echo (BAKARI)    CANCELED: Transesophageal Echo (BAKARI)      11. Atherosclerosis of coronary artery bypass graft(s), unspecified, with angina pectoris with documented spasm (CMS-HCC)  Transesophageal Echo (BAKARI)        General:  General  Reason for Referral: impaired adl; pt. admitted with chest pain x 4 days, 1/21 cardiac cath=severe 3 vessel disease, underwent cabg x 4 1/24/25  Referred By: Piter  Past Medical History Relevant to Rehab: htn, hld, hypothyroidism, pvd, endarterectomy  Co-Treatment: PT  Co-Treatment Reason: maximize pt. safety/abilities  Prior to Session Communication: Bedside nurse  Patient Position Received: Up in chair, Alarm off, not on at start of session  General Comment: pt. agreeable to therapy intervention  Precautions:  Precautions Comment: MITT, o2 2.5 L/min, progress as tolerated, tele, VSS     Date/Time Vitals Session Patient Position Pulse Resp SpO2 BP MAP (mmHg)    01/27/25 1400 --  --  80  17  --  106/62  79     01/27/25 1500 --  --  75  15  --  105/64  80                 Pain:  Pain Assessment  Pain Assessment:  (3/10 incisional  pain)    Objective   Cognition:  Overall Cognitive Status: Within Functional Limits           Home Living:  Home Living Comments: pt. lives with adult daughter who works, 1 entry step without rail, 1 floor including laundry, tub/shower, standard toilet  Prior Function:  Prior Function Comments: sit<> stand from recliner chair:  sba  IADL History:     ADL:  ADL Comments: don gown as robe with min assist x 1, cues for MITT, would estimate max assist for LB bathe/dress, mod assist for toileting, min assist for ub bathe/dress, set up for grooming/feeding  Activity Tolerance:  Early Mobility/Exercise Safety Screen: Proceed with mobilization - No exclusion criteria met  Bed Mobility/Transfers:      Transfers  Transfer:  (sit<> stand from recliner;  sba)      Functional Mobility:  Functional Mobility  Functional Mobility Performed:  (via Quintiles walker, pt. able to ambulate with sba, no sob, minimal fatigue)     Strength:  Strength Comments: bue's at least 3/5  Outcome Measures:[facility] Daily Activity  Putting on and taking off regular lower body clothing: A lot  Bathing (including washing, rinsing, drying): A lot  Putting on and taking off regular upper body clothing: A little  Toileting, which includes using toilet, bedpan or urinal: A lot  Taking care of personal grooming such as brushing teeth: None  Eating Meals: None  Daily Activity - Total Score: 17         and Early Mobility/Exercise Safety Screen: Proceed with mobilization - No exclusion criteria met  ICU Mobility Scale: Walking with assistance of 1 person [8]    Education Documentation  Precautions, taught by Pooja Palencia OT at 1/27/2025  3:47 PM.  Learner: Patient  Readiness: Acceptance  Method: Explanation  Response: Verbalizes Understanding, Needs Reinforcement    ADL Training, taught by Pooja Palencia OT at 1/27/2025  3:47 PM.  Learner: Patient  Readiness: Acceptance  Method: Explanation  Response: Verbalizes Understanding, Needs Reinforcement    Education  Comments  MITT, energy conservation    Goals:  Encounter Problems       Encounter Problems (Active)       OT Goals       Increase functional mobility and  functional transfers to supervision for bed/chair/toilet with dme prn   (Progressing)       Start:  01/27/25    Expected End:  02/03/25            increase bue ther ex/activity x 7-10 minutes and increase standing tolerance x 3-5 minutes with supervision to promote greater activity tolerance for assist with adl.   (Progressing)       Start:  01/27/25    Expected End:  02/03/25            Increase ub/lb dressing to supervision with dme prn  (Progressing)       Start:  01/27/25    Expected End:  02/03/25            Increase toileting to supervision with dme prn  (Progressing)       Start:  01/27/25    Expected End:  02/03/25            pt. to apply ec/ws techniques with minimal cues to all mobility/transfer/adl to decrease fatigue/promote efficient use of energy toward completion of functional tasks.  (Progressing)       Start:  01/27/25    Expected End:  02/03/25

## 2025-01-27 NOTE — PROGRESS NOTES
Physical Therapy    Physical Therapy Evaluation    Patient Name: Jessy Garcia  MRN: [id number]  [id number]  Today's Date: 1/27/2025   Time Calculation  Start Time: 1001  Stop Time: 1016  Time Calculation (min): 15 min    Assessment/Plan   PT Assessment  PT Assessment Results: Decreased endurance, Decreased mobility  Rehab Prognosis: Good  End of Session Communication: Bedside nurse  Assessment Comment: Pt presenting with a decline in functional mobility from baseline. Pt would benefit from further PT services to address these deficits to return to prior level of function.  End of Session Patient Position: Up in chair, Alarm off, not on at start of session  IP OR SWING BED PT PLAN  Inpatient or Swing Bed: Inpatient  PT Plan  Treatment/Interventions: Bed mobility, Transfer training, Gait training, Balance training, Strengthening, Endurance training, Therapeutic exercise, Therapeutic activity  PT Plan: Ongoing PT  PT Frequency: 3 times per week  PT Discharge Recommendations: Low intensity level of continued care  PT - OK to Discharge: Yes    Subjective     Current Problem:  Patient Active Problem List   Diagnosis    Chest pain, unspecified type    ACS (acute coronary syndrome) (Multi)    NSTEMI (non-ST elevated myocardial infarction) (Multi)       General Visit Information:  General  Reason for Referral: PT eval and treat; s.p CABG x 4 on 1/24/25  Referred By: Adam Dumas  Past Medical History Relevant to Rehab: HTN, HLD, PVD, hypothyroidism  Co-Treatment: OT  Co-Treatment Reason: For patient safety and to maximize mobility  Prior to Session Communication: Bedside nurse  Patient Position Received: Up in chair, Alarm off, not on at start of session  General Comment: Pt sitting in chair upon entering, agreeable to PT    Home Living:  Home Living  Type of Home: House  Lives With:  (adult daughter who works)  Home Adaptive Equipment: None  Home Layout: One level, Laundry main level  Home Access: Stairs to enter without  rails  Entrance Stairs-Number of Steps: 1  Bathroom Shower/Tub: Tub/shower unit  Bathroom Toilet: Standard  Bathroom Equipment: None    Prior Level of Function:  Prior Function Per Pt/Caregiver Report  ADL Assistance: Independent  Homemaking Assistance: Independent  Ambulatory Assistance: Independent  Vocational: Retired  Prior Function Comments: (+) driving    Precautions:  Precautions  Medical Precautions: Cardiac precautions  Post-Surgical Precautions: Move in the Tube  Precautions Comment: telemetry, 2.5 L O2    Vital Signs:  Vital Signs  Vital Signs Comment: VSS throughout session  Objective     Pain:  Pain Assessment  Pain Assessment: 0-10  0-10 (Numeric) Pain Score: 0 - No pain (Pt reports soreness but no pain)    Cognition:  Cognition  Overall Cognitive Status: Within Functional Limits    General Assessments:      Activity Tolerance  Endurance: Endurance does not limit participation in activity  Early Mobility/Exercise Safety Screen: Proceed with mobilization - No exclusion criteria met  Sensation  Light Touch: No apparent deficits  Strength  Strength Comments: BLE WFL for MMT and ROM  Perception  Inattention/Neglect: Appears intact  Coordination  Movements are Fluid and Coordinated: Yes  Postural Control  Postural Control: Within Functional Limits  Static Sitting Balance  Static Sitting-Level of Assistance: Independent  Dynamic Sitting Balance  Dynamic Sitting-Level of Assistance: Independent  Static Standing Balance  Static Standing-Level of Assistance: Modified independent  Dynamic Standing Balance  Dynamic Standing-Level of Assistance: Close supervision    Functional Assessments:     Bed Mobility  Bed Mobility: Yes  Bed Mobility 1  Bed Mobility Comments 1: supine to sit not assessed, pt in chair  Transfers  Transfer:  (sit to stand SBA with WW, cues for technique)  Ambulation/Gait Training  Ambulation/Gait Training Performed:  (Pt ambulates >150ft with nezzi walker with SBA with no acute LOB noted. Pt  denies SOB. Cues for activity pacing provided)          Extremity/Trunk Assessments:        RLE   RLE : Within Functional Limits  LLE   LLE : Within Functional Limits    Outcome Measures:  [facility] Basic Mobility  Turning from your back to your side while in a flat bed without using bedrails: A little  Moving from lying on your back to sitting on the side of a flat bed without using bedrails: A little  Moving to and from bed to chair (including a wheelchair): A little  Standing up from a chair using your arms (e.g. wheelchair or bedside chair): A little  To walk in hospital room: A little  Climbing 3-5 steps with railing: A little  Basic Mobility - Total Score: 18           FSS-ICU  Ambulation: Walks >/ or equal to 150 feet with supervision  Rolling: Minimal assistance (performs 75% or more of task)  Sitting: Complete independence  Transfer Sit-to-Stand: Minimal assistance (performs 75% or more of task)  Transfer Supine-to-Sit: Minimal assistance (performs 75% or more of task)  Total Score: 24  ICU Mobility Screen  Early Mobility/Exercise Safety Screen: Proceed with mobilization - No exclusion criteria met  ICU Mobility Scale: Walking with assistance of 1 person  E = Exercise and Early Mobility  Early Mobility/Exercise Safety Screen: Proceed with mobilization - No exclusion criteria met  ICU Mobility Scale: Walking with assistance of 1 person          Goals:  Encounter Problems       Encounter Problems (Active)       PT Problem       PT Goal 1 STG - Pt will amb >150' using no device with IND  (Progressing)       Start:  01/27/25    Expected End:  02/10/25            PT Goal 2 STG - Pt will transition supine <> sitting with IND (Progressing)       Start:  01/27/25    Expected End:  02/10/25            PT Goal 3 STG -  Pt will navigate 4 stairs using rail with MOD IND  (Progressing)       Start:  01/27/25    Expected End:  02/10/25            PT Goal 4 STG - Pt will transfer STS with IND (Progressing)       Start:   01/27/25    Expected End:  02/10/25               Pain - Adult            Education Documentation  Precautions, taught by Radha Guzman PT at 1/27/2025  3:45 PM.  Learner: Patient  Readiness: Acceptance  Method: Explanation  Response: Verbalizes Understanding    Mobility Training, taught by Radha Guzman PT at 1/27/2025  3:45 PM.  Learner: Patient  Readiness: Acceptance  Method: Explanation  Response: Verbalizes Understanding    Education Comments  No comments found.

## 2025-01-27 NOTE — CONSULTS
Nutrition Initial Assessment:   Nutrition Assessment    Reason for Assessment: Dietitian discretion (CABG; MAT=0)    Patient is a 75 y.o. female presenting with chest pain; s/p CABG x4 1/24/25    PmHx: hypothyroidism, prediabetic, carotid stenosis s/p bilateral endarterectomies, HTN, HLD, obesity     Nutrition History:  Energy Intake: Fair 50-75 %  Pain affecting nutrition status: N/A  Food and Nutrient History: Pt sitting in chair at bedside at visit.  POD#3 CABG x4.  Pt reports appetite is slowly improving.  Was agreeable to Aquilino BID and Ensure HP until appetite fully returns.  Vitamin/Herbal Supplement Use: FeSO4, MVI with minerals       Anthropometrics:  Height: 152.4 cm (5')   Weight: 93 kg (205 lb 0.4 oz)   BMI (Calculated): 40.04  IBW/kg (Dietitian Calculated): 45.5 kg  Percent of IBW: 204 %       Weight History:     Weight Change %:  Weight History / % Weight Change: 1/26 93.3kg, 1/25 93kg, 1/24 86.6kg, 1/19 89.7kg  Significant Weight Gain: Fluid related    Nutrition Focused Physical Exam Findings:    Subcutaneous Fat Loss:   Orbital Fat Pads: Well nourished (slightly bulging fat pads)  Buccal Fat Pads: Well nourished (full, rounded cheeks)  Triceps: Well nourished (ample fat tissue)  Muscle Wasting:  Temporalis: Well nourished (well-defined muscle)  Pectoralis (Clavicular Region): Well nourished (clavicle not visible)  Deltoid/Trapezius: Well nourished (rounded appearance at arm, shoulder, neck)  Interosseous: Well nourished (muscle bulges)  Edema:  Edema Location: Non pitting generalized, BUE and LLE  Physical Findings:  Skin: Positive (surgical incisions to midsternum, L leg x2, R arm x2)  Respiratory : Negative    Nutrition Significant Labs:  CBC Trend:   Results from last 7 days   Lab Units 01/27/25  0733 01/26/25  0443 01/25/25  0420 01/24/25  1352   WBC AUTO x10*3/uL 8.0 11.1 9.6 14.5*   RBC AUTO x10*6/uL 2.79* 2.76* 3.24* 3.41*   HEMOGLOBIN g/dL 8.3* 8.1* 9.6* 9.9*   HEMATOCRIT % 25.6* 25.2* 28.1*  29.8*   MCV fL 92 91 87 87   PLATELETS AUTO x10*3/uL 170 146* 193 169    , BMP Trend:   Results from last 7 days   Lab Units 01/27/25  0733 01/26/25  0443 01/25/25  1641 01/25/25  0420   GLUCOSE mg/dL 133* 123* 120* 154*   CALCIUM mg/dL 7.9* 8.3* 8.4* 8.0*   SODIUM mmol/L 138 136 140 141   POTASSIUM mmol/L 4.1 4.0 4.0 3.9   CO2 mmol/L 30 29 27 24   CHLORIDE mmol/L 100 102 106 109*   BUN mg/dL 16 13 13 12   CREATININE mg/dL 0.88 0.92 0.99 0.77    , A1C:  Lab Results   Component Value Date    HGBA1C 5.8 (H) 01/20/2025   , BG POCT trend:   Results from last 7 days   Lab Units 01/27/25  1201 01/27/25  0737 01/27/25  0409 01/27/25  0036 01/26/25  2132   POCT GLUCOSE mg/dL 120* 116* 108* 143* 113*    , Lipid Panel:   Lab Results   Component Value Date    CHOL 398 (H) 01/20/2025    HDL 62.3 01/20/2025    CHHDL 6.4 01/20/2025    VLDL 51 (H) 01/20/2025    TRIG 253 (H) 01/20/2025        Nutrition Specific Medications:  Reviewed    I/O:   Last BM Date: 01/27/25; Stool Appearance: Watery (01/27/25 0400)    Dietary Orders (From admission, onward)       Start     Ordered    01/27/25 0853  Oral nutritional supplements  Until discontinued        Question Answer Comment   Deliver with Lunch    Deliver with Dinner    Select supplement: Aquilino        01/27/25 0852    01/27/25 0853  Oral nutritional supplements  Until discontinued        Question Answer Comment   Deliver with Breakfast    Deliver with Dinner    Select supplement: Ensure High Protein        01/27/25 0852    01/26/25 1331  Adult diet Regular  Diet effective now        Question:  Diet type  Answer:  Regular    01/26/25 1330    01/23/25 2213  May Participate in Room Service  ( ROOM SERVICE MAY PARTICIPATE)  Once        Question:  .  Answer:  Yes    01/23/25 2212                     Estimated Needs:      Method for Estimating Needs: 1365-1600kcals ( 30-35kcals/kg IBW)     Method for Estimating 24 Hour Protein Needs: 55-70g (1.2-1.5g/kg IBW)     Method for Estimating 24  Hour Fluid Needs: 1100-1200ml or per MD        Nutrition Diagnosis   Malnutrition Diagnosis  Patient has Malnutrition Diagnosis: No    Nutrition Diagnosis  Patient has Nutrition Diagnosis: Yes  Diagnosis Status (1): New  Nutrition Diagnosis 1: Increased nutrient needs  Related to (1): physiological causes increasing nutrient needs  As Evidenced by (1): post op wound healing needs       Nutrition Interventions/Recommendations   Nutrition prescription for oral nutrition    Nutrition Recommendations:  Individualized Nutrition Prescription Provided for : Regular diet as ordered with Aquilino BID and Ensure HP BID    Nutrition Interventions/Goals:   Interventions: Meals and snacks, Medical food supplement  Meals and Snacks: General healthful diet  Goal: consume >75% of Meals  Medical Food Supplement: Commercial beverage medical food supplement therapy  Goal: consume >75% of Ensure HP twice daily (for an additional 160 kcals, 16 gm protein each)  Additional Interventions: consume >75% of Aquilino BID (for an additional 90 kcals, 2.5 gm protein, supplement glutamine and arginine)      Education Documentation  No documentation found.    Will reassess education needs at follow up      Nutrition Monitoring and Evaluation   Food/Nutrient Related History Monitoring  Monitoring and Evaluation Plan: Estimated Energy Intake, Fluid intake, Intake / amount of food  Estimated Energy Intake: Energy intake greater or equal to 75% of estimated energy needs  Fluid Intake: Estimated fluid intake  Intake / Amount of food: Consumes at least 75% or more of meals/snacks/supplements    Anthropometric Measurements  Monitoring and Evaluation Plan: Body weight  Body Weight: Body weight - Weight reduction from fluids, as needed    Biochemical Data, Medical Tests and Procedures  Monitoring and Evaluation Plan: Electrolyte/renal panel, Glucose/endocrine profile  Electrolyte and Renal Panel: Phosphorus, Sodium, Electrolytes within normal  limits  Glucose/Endocrine Profile: Glucose within normal limits ( mg/dL)    Physical Exam Findings  Monitoring and Evaluation Plan: Skin, Edema  Edema Finding:  (non pitting)  Other: promote healing through adequate nutrition    Goal Status: New goal(s) identified    Time Spent (min): 45 minutes

## 2025-01-28 ENCOUNTER — APPOINTMENT (OUTPATIENT)
Dept: RADIOLOGY | Facility: HOSPITAL | Age: 75
DRG: 234 | End: 2025-01-28
Payer: MEDICARE

## 2025-01-28 ENCOUNTER — APPOINTMENT (OUTPATIENT)
Dept: CARDIOLOGY | Facility: HOSPITAL | Age: 75
DRG: 234 | End: 2025-01-28
Payer: MEDICARE

## 2025-01-28 LAB
ALBUMIN SERPL BCP-MCNC: 3.3 G/DL (ref 3.4–5)
ANION GAP SERPL CALC-SCNC: 11 MMOL/L (ref 10–20)
ATRIAL RATE: 55 BPM
ATRIAL RATE: 73 BPM
BLOOD EXPIRATION DATE: NORMAL
BUN SERPL-MCNC: 18 MG/DL (ref 6–23)
CALCIUM SERPL-MCNC: 8.3 MG/DL (ref 8.6–10.3)
CHLORIDE SERPL-SCNC: 102 MMOL/L (ref 98–107)
CO2 SERPL-SCNC: 32 MMOL/L (ref 21–32)
CREAT SERPL-MCNC: 0.68 MG/DL (ref 0.5–1.05)
DIASTOLIC BLOOD PRESSURE: 55 MMHG
DIASTOLIC BLOOD PRESSURE: 66 MMHG
DISPENSE STATUS: NORMAL
EGFRCR SERPLBLD CKD-EPI 2021: >90 ML/MIN/1.73M*2
ERYTHROCYTE [DISTWIDTH] IN BLOOD BY AUTOMATED COUNT: 13.8 % (ref 11.5–14.5)
GLUCOSE BLD MANUAL STRIP-MCNC: 108 MG/DL (ref 74–99)
GLUCOSE BLD MANUAL STRIP-MCNC: 129 MG/DL (ref 74–99)
GLUCOSE BLD MANUAL STRIP-MCNC: 130 MG/DL (ref 74–99)
GLUCOSE BLD MANUAL STRIP-MCNC: 236 MG/DL (ref 74–99)
GLUCOSE SERPL-MCNC: 110 MG/DL (ref 74–99)
HCT VFR BLD AUTO: 23.9 % (ref 36–46)
HGB BLD-MCNC: 7.8 G/DL (ref 12–16)
MAGNESIUM SERPL-MCNC: 1.85 MG/DL (ref 1.6–2.4)
MCH RBC QN AUTO: 29.4 PG (ref 26–34)
MCHC RBC AUTO-ENTMCNC: 32.6 G/DL (ref 32–36)
MCV RBC AUTO: 90 FL (ref 80–100)
NRBC BLD-RTO: 0 /100 WBCS (ref 0–0)
P AXIS: 53 DEGREES
P AXIS: 70 DEGREES
P OFFSET: 178 MS
P OFFSET: 179 MS
P ONSET: 119 MS
P ONSET: 124 MS
PHOSPHATE SERPL-MCNC: 2.1 MG/DL (ref 2.5–4.9)
PLATELET # BLD AUTO: 199 X10*3/UL (ref 150–450)
POTASSIUM SERPL-SCNC: 3.6 MMOL/L (ref 3.5–5.3)
PR INTERVAL: 192 MS
PR INTERVAL: 194 MS
PRODUCT BLOOD TYPE: 6200
PRODUCT CODE: NORMAL
Q ONSET: 215 MS
Q ONSET: 221 MS
QRS COUNT: 12 BEATS
QRS COUNT: 9 BEATS
QRS DURATION: 86 MS
QRS DURATION: 86 MS
QT INTERVAL: 448 MS
QT INTERVAL: 448 MS
QTC CALCULATION(BAZETT): 428 MS
QTC CALCULATION(BAZETT): 493 MS
QTC FREDERICIA: 435 MS
QTC FREDERICIA: 478 MS
R AXIS: 18 DEGREES
R AXIS: 31 DEGREES
RBC # BLD AUTO: 2.65 X10*6/UL (ref 4–5.2)
SODIUM SERPL-SCNC: 141 MMOL/L (ref 136–145)
SYSTOLIC BLOOD PRESSURE: 114 MMHG
SYSTOLIC BLOOD PRESSURE: 79 MMHG
T AXIS: -4 DEGREES
T AXIS: 16 DEGREES
T OFFSET: 439 MS
T OFFSET: 445 MS
UNIT ABO: NORMAL
UNIT NUMBER: NORMAL
UNIT RH: NORMAL
UNIT VOLUME: 350
VENTRICULAR RATE: 55 BPM
VENTRICULAR RATE: 73 BPM
WBC # BLD AUTO: 5.7 X10*3/UL (ref 4.4–11.3)
XM INTEP: NORMAL

## 2025-01-28 PROCEDURE — 2500000005 HC RX 250 GENERAL PHARMACY W/O HCPCS: Performed by: NURSE PRACTITIONER

## 2025-01-28 PROCEDURE — 2500000004 HC RX 250 GENERAL PHARMACY W/ HCPCS (ALT 636 FOR OP/ED): Performed by: NURSE PRACTITIONER

## 2025-01-28 PROCEDURE — 2500000004 HC RX 250 GENERAL PHARMACY W/ HCPCS (ALT 636 FOR OP/ED)

## 2025-01-28 PROCEDURE — 2060000001 HC INTERMEDIATE ICU ROOM DAILY

## 2025-01-28 PROCEDURE — 2500000005 HC RX 250 GENERAL PHARMACY W/O HCPCS: Performed by: THORACIC SURGERY (CARDIOTHORACIC VASCULAR SURGERY)

## 2025-01-28 PROCEDURE — 80069 RENAL FUNCTION PANEL: CPT | Performed by: NURSE PRACTITIONER

## 2025-01-28 PROCEDURE — 82947 ASSAY GLUCOSE BLOOD QUANT: CPT

## 2025-01-28 PROCEDURE — 71045 X-RAY EXAM CHEST 1 VIEW: CPT

## 2025-01-28 PROCEDURE — 83735 ASSAY OF MAGNESIUM: CPT | Performed by: NURSE PRACTITIONER

## 2025-01-28 PROCEDURE — 2500000002 HC RX 250 W HCPCS SELF ADMINISTERED DRUGS (ALT 637 FOR MEDICARE OP, ALT 636 FOR OP/ED): Performed by: NURSE PRACTITIONER

## 2025-01-28 PROCEDURE — 93005 ELECTROCARDIOGRAM TRACING: CPT

## 2025-01-28 PROCEDURE — 99233 SBSQ HOSP IP/OBS HIGH 50: CPT | Performed by: NURSE PRACTITIONER

## 2025-01-28 PROCEDURE — 2500000001 HC RX 250 WO HCPCS SELF ADMINISTERED DRUGS (ALT 637 FOR MEDICARE OP): Performed by: NURSE PRACTITIONER

## 2025-01-28 PROCEDURE — 36415 COLL VENOUS BLD VENIPUNCTURE: CPT | Performed by: NURSE PRACTITIONER

## 2025-01-28 PROCEDURE — 85027 COMPLETE CBC AUTOMATED: CPT | Performed by: NURSE PRACTITIONER

## 2025-01-28 PROCEDURE — 71045 X-RAY EXAM CHEST 1 VIEW: CPT | Performed by: RADIOLOGY

## 2025-01-28 PROCEDURE — 2500000001 HC RX 250 WO HCPCS SELF ADMINISTERED DRUGS (ALT 637 FOR MEDICARE OP)

## 2025-01-28 RX ORDER — FUROSEMIDE 20 MG/1
20 TABLET ORAL
Status: DISCONTINUED | OUTPATIENT
Start: 2025-01-28 | End: 2025-01-29 | Stop reason: HOSPADM

## 2025-01-28 RX ADMIN — METHOCARBAMOL 500 MG: 500 TABLET ORAL at 21:03

## 2025-01-28 RX ADMIN — Medication 2 L/MIN: at 08:25

## 2025-01-28 RX ADMIN — OXYCODONE HYDROCHLORIDE 5 MG: 5 TABLET ORAL at 21:10

## 2025-01-28 RX ADMIN — MUPIROCIN 1 APPLICATION: 20 OINTMENT TOPICAL at 08:28

## 2025-01-28 RX ADMIN — ACETAMINOPHEN 650 MG: 325 TABLET, FILM COATED ORAL at 13:00

## 2025-01-28 RX ADMIN — LIDOCAINE 4% 1 PATCH: 40 PATCH TOPICAL at 13:09

## 2025-01-28 RX ADMIN — CLOPIDOGREL BISULFATE 75 MG: 75 TABLET ORAL at 08:28

## 2025-01-28 RX ADMIN — GABAPENTIN 100 MG: 100 CAPSULE ORAL at 08:28

## 2025-01-28 RX ADMIN — METHOCARBAMOL 500 MG: 500 TABLET ORAL at 06:17

## 2025-01-28 RX ADMIN — Medication 2 L/MIN: at 21:00

## 2025-01-28 RX ADMIN — ACETAMINOPHEN 650 MG: 325 TABLET, FILM COATED ORAL at 20:00

## 2025-01-28 RX ADMIN — FUROSEMIDE 20 MG: 20 TABLET ORAL at 17:50

## 2025-01-28 RX ADMIN — LOVASTATIN 40 MG: 40 TABLET ORAL at 08:30

## 2025-01-28 RX ADMIN — POLYETHYLENE GLYCOL 3350 17 G: 17 POWDER, FOR SOLUTION ORAL at 08:28

## 2025-01-28 RX ADMIN — METOPROLOL TARTRATE 12.5 MG: 25 TABLET, FILM COATED ORAL at 21:03

## 2025-01-28 RX ADMIN — SENNOSIDES AND DOCUSATE SODIUM 2 TABLET: 50; 8.6 TABLET ORAL at 08:28

## 2025-01-28 RX ADMIN — Medication 1 TABLET: at 08:28

## 2025-01-28 RX ADMIN — METOPROLOL TARTRATE 12.5 MG: 25 TABLET, FILM COATED ORAL at 08:28

## 2025-01-28 RX ADMIN — FERROUS SULFATE TAB 325 MG (65 MG ELEMENTAL FE) 325 MG: 325 (65 FE) TAB at 08:28

## 2025-01-28 RX ADMIN — MUPIROCIN 1 APPLICATION: 20 OINTMENT TOPICAL at 21:03

## 2025-01-28 RX ADMIN — GUAIFENESIN 600 MG: 600 TABLET, EXTENDED RELEASE ORAL at 21:10

## 2025-01-28 RX ADMIN — GABAPENTIN 100 MG: 100 CAPSULE ORAL at 21:01

## 2025-01-28 RX ADMIN — ACETAMINOPHEN 650 MG: 325 TABLET, FILM COATED ORAL at 08:28

## 2025-01-28 RX ADMIN — GABAPENTIN 100 MG: 100 CAPSULE ORAL at 17:50

## 2025-01-28 RX ADMIN — POTASSIUM CHLORIDE 40 MEQ: 1.5 POWDER, FOR SOLUTION ORAL at 13:00

## 2025-01-28 RX ADMIN — GUAIFENESIN 600 MG: 600 TABLET, EXTENDED RELEASE ORAL at 08:28

## 2025-01-28 RX ADMIN — ENOXAPARIN SODIUM 40 MG: 40 INJECTION SUBCUTANEOUS at 08:29

## 2025-01-28 RX ADMIN — LEVOTHYROXINE SODIUM 50 MCG: 0.05 TABLET ORAL at 06:17

## 2025-01-28 RX ADMIN — MAGNESIUM SULFATE HEPTAHYDRATE 2 G: 40 INJECTION, SOLUTION INTRAVENOUS at 13:00

## 2025-01-28 RX ADMIN — FUROSEMIDE 20 MG: 20 TABLET ORAL at 13:00

## 2025-01-28 RX ADMIN — MAGNESIUM OXIDE TAB 400 MG (241.3 MG ELEMENTAL MG) 400 MG: 400 (241.3 MG) TAB at 08:28

## 2025-01-28 RX ADMIN — ASPIRIN 81 MG CHEWABLE TABLET 81 MG: 81 TABLET CHEWABLE at 08:28

## 2025-01-28 ASSESSMENT — COGNITIVE AND FUNCTIONAL STATUS - GENERAL
DAILY ACTIVITIY SCORE: 24
MOBILITY SCORE: 24

## 2025-01-28 ASSESSMENT — ENCOUNTER SYMPTOMS
NUMBNESS: 0
ABDOMINAL PAIN: 0
PSYCHIATRIC NEGATIVE: 1
WEAKNESS: 1
PARESTHESIAS: 0
WHEEZING: 0
GASTROINTESTINAL NEGATIVE: 1
FEVER: 0
DIAPHORESIS: 0
MUSCLE CRAMPS: 0
ENDOCRINE NEGATIVE: 1
EYES NEGATIVE: 1
BLOATING: 0
IRREGULAR HEARTBEAT: 0
SHORTNESS OF BREATH: 0
DOUBLE VISION: 0
BLURRED VISION: 0
COUGH: 0
HEMATOLOGIC/LYMPHATIC NEGATIVE: 1

## 2025-01-28 ASSESSMENT — PAIN - FUNCTIONAL ASSESSMENT
PAIN_FUNCTIONAL_ASSESSMENT: 0-10

## 2025-01-28 ASSESSMENT — PAIN DESCRIPTION - LOCATION: LOCATION: CHEST

## 2025-01-28 ASSESSMENT — PAIN SCALES - GENERAL
PAINLEVEL_OUTOF10: 0 - NO PAIN
PAINLEVEL_OUTOF10: 4
PAINLEVEL_OUTOF10: 0 - NO PAIN

## 2025-01-28 ASSESSMENT — PAIN DESCRIPTION - ORIENTATION: ORIENTATION: MID

## 2025-01-28 NOTE — NURSING NOTE
Cardiac Rehab - Phase I    Discipline: Nursing     Topic: Cardiac Rehab    Description:  Cardiac Rehabilitation Education done by cardiopulmonary rehabilitation staff with good understanding:  yes   Qualifying cardiac diagnosis/procedure: yes  Addressed patient's questions regarding cardiac diagnosis/procedure: yes    Healthy nutrition education done: yes  Cardiac Healthy lifestyle education done: yes  Does patient meet criteria for program enrollment: yes  Benefits of participation in Cardiac Rehabilitation program discussed with patient: yes  Is patient interested in attending Cardiac Rehabilitation: yes  Does patient have transportation to attend the program: yes  Cardiac Rehabilitation pamphlet given to patient: yes  Insurance coverage verification process explained to patient: yes  Initial interview scheduled: No, pt aware that  interview will be scheduled after discharge  Contact information for [facility] Cardiac Rehabilitation given to patient: yes    Comments: 174  UpToDate Patient Education Handouts Provided:  - Cardiac Rehab Information  - Heart Healthy Diet  - Coronary Artery Bypass Graft Surgery

## 2025-01-28 NOTE — PROGRESS NOTES
[facility]   Cardiothoracic Surgery   Progress Note        Jessy Garcia is a 75 y.o. female on day 7 of admission presenting with Chest pain, unspecified type.    Subjective     Interval HPI: Seen and assessed patient this morning while she was laying in bed. In no acute distress and without significant complaints.       Review of Systems   Constitutional: Positive for malaise/fatigue. Negative for diaphoresis and fever.   HENT: Negative.  Negative for congestion.    Eyes: Negative.  Negative for blurred vision and double vision.   Cardiovascular:  Negative for chest pain and irregular heartbeat.   Respiratory:  Negative for cough, shortness of breath and wheezing.    Endocrine: Negative.    Hematologic/Lymphatic: Negative.    Skin: Negative.    Musculoskeletal:  Positive for muscle weakness. Negative for muscle cramps.   Gastrointestinal: Negative.  Negative for bloating and abdominal pain.   Genitourinary: Negative.    Neurological:  Positive for weakness. Negative for numbness and paresthesias.   Psychiatric/Behavioral: Negative.           Objective   Physical Exam  Physical Exam  Vitals and nursing note reviewed.   Constitutional:       General: She is awake. She is not in acute distress.     Appearance: Normal appearance.   HENT:      Head: Normocephalic and atraumatic.   Eyes:      Extraocular Movements: Extraocular movements intact.      Pupils: Pupils are equal, round, and reactive to light.   Cardiovascular:      Rate and Rhythm: Normal rate and regular rhythm.      Pulses: Normal pulses.      Heart sounds: Normal heart sounds, S1 normal and S2 normal. No murmur heard.     No friction rub. No gallop.   Pulmonary:      Effort: Pulmonary effort is normal.      Breath sounds: Examination of the right-lower field reveals decreased breath sounds. Examination of the left-lower field reveals decreased breath sounds. Decreased breath sounds present.   Chest:      Comments:  Midsternal incision well approximated, dry, and intact  Abdominal:      General: Bowel sounds are normal.      Palpations: Abdomen is soft.      Tenderness: There is no abdominal tenderness.   Musculoskeletal:      Cervical back: Normal range of motion and neck supple.   Skin:     General: Skin is warm and dry.      Capillary Refill: Capillary refill takes less than 2 seconds.   Neurological:      Mental Status: She is alert and oriented to person, place, and time.      GCS: GCS eye subscore is 4. GCS verbal subscore is 5. GCS motor subscore is 6.      Cranial Nerves: Cranial nerves 2-12 are intact.      Sensory: Sensation is intact.      Motor: Motor function is intact.      Coordination: Coordination is intact.      Gait: Gait is intact.   Psychiatric:         Attention and Perception: Attention and perception normal.         Mood and Affect: Mood and affect normal.         Speech: Speech normal.         Behavior: Behavior normal. Behavior is cooperative.         Cognition and Memory: Cognition normal.         Last Recorded Vitals  Vitals:    01/28/25 0900 01/28/25 1000 01/28/25 1100 01/28/25 1200   BP:       BP Location:       Patient Position:       Pulse: 80 72 73 80   Resp: 24 21 15 21   Temp:       TempSrc:       SpO2: 100% 97% 97% 92%   Weight:       Height:            Intake/Output last 3 Shifts:  I/O last 3 completed shifts:  In: 480 (5.2 mL/kg) [P.O.:220; I.V.:10 (0.1 mL/kg); IV Piggyback:250]  Out: 2900 (31.2 mL/kg) [Urine:2900 (0.9 mL/kg/hr)]  Weight: 93 kg     Inpatient Medications  acetaminophen, 650 mg, oral, q6h  aspirin, 81 mg, oral, Daily  clopidogrel, 75 mg, oral, Daily  enoxaparin, 40 mg, subcutaneous, Daily  ferrous sulfate (325 mg ferrous sulfate), 65 mg of iron, oral, Daily with breakfast  furosemide, 20 mg, intravenous, BID  gabapentin, 100 mg, oral, TID  insulin lispro, 0-15 Units, subcutaneous, Before meals & nightly  levothyroxine, 50 mcg, oral, Daily  lidocaine, 1 patch, transdermal,  q24h  lovastatin, 40 mg, oral, Daily  magnesium oxide, 400 mg, oral, Daily  methocarbamol, 500 mg, oral, q8h CHRIS  metoprolol tartrate, 12.5 mg, oral, BID  multivitamin with minerals, 1 tablet, oral, Daily  mupirocin, 1 Application, Each Nostril, BID  oxygen, , inhalation, Continuous - Inhalation  polyethylene glycol, 17 g, oral, Daily  sennosides-docusate sodium, 2 tablet, oral, BID      Continuous medications       PRN medications  PRN medications: fentaNYL, guaiFENesin, magnesium sulfate, magnesium sulfate, melatonin, metoclopramide **OR** metoclopramide, naloxone, ondansetron ODT **OR** ondansetron, oxyCODONE, oxyCODONE, potassium chloride CR **OR** potassium chloride, potassium chloride CR **OR** potassium chloride     LDA:       Relevant Results  Lab Review  Results from last 7 days   Lab Units 01/28/25  0530   WBC AUTO x10*3/uL 5.7   HEMOGLOBIN g/dL 7.8*   HEMATOCRIT % 23.9*   PLATELETS AUTO x10*3/uL 199     Results from last 7 days   Lab Units 01/28/25  0531   SODIUM mmol/L 141   POTASSIUM mmol/L 3.6   CHLORIDE mmol/L 102   CO2 mmol/L 32   BUN mg/dL 18   CREATININE mg/dL 0.68   CALCIUM mg/dL 8.3*   GLUCOSE mg/dL 110*     Results from last 7 days   Lab Units 01/28/25  0531   MAGNESIUM mg/dL 1.85     Results from last 7 days   Lab Units 01/25/25  0712   POCT PH, ARTERIAL pH 7.39   POCT PCO2, ARTERIAL mm Hg 41   POCT PO2, ARTERIAL mm Hg 77*   POCT HCO3 CALCULATED, ARTERIAL mmol/L 24.8   POCT BASE EXCESS, ARTERIAL mmol/L -0.2         Radiographic Testing    Echo:  Transthoracic Echo (TTE) Complete 01/20/2025  PHYSICIAN INTERPRETATION:  Left Ventricle: Left ventricular ejection fraction is normal, by visual estimate at 55-60%. There are no regional left ventricular wall motion abnormalities. The left ventricular cavity size is normal. There is mildly increased septal and mildly increased posterior left ventricular wall thickness. There is left ventricular concentric remodeling. Spectral Doppler shows a Grade I  (impaired relaxation pattern) of left ventricular diastolic filling with normal left atrial filling pressure.  Left Atrium: The left atrium is normal in size.  Right Ventricle: The right ventricle is normal in size. There is normal right ventricular global systolic function.  Right Atrium: The right atrium is normal in size.  Aortic Valve: The aortic valve is trileaflet. There is mild aortic valve thickening. There is evidence of mildly elevated transaortic gradients consistent with sclerosis of the aortic valve. The aortic valve dimensionless index is 0.65. There is trace aortic valve regurgitation. The peak instantaneous gradient of the aortic valve is 12 mmHg. The mean gradient of the aortic valve is 8 mmHg.  Mitral Valve: The mitral valve is mildly thickened. The peak instantaneous gradient of the mitral valve is 8 mmHg. There is moderate mitral valve regurgitation. The mitral regurgitant orifice area is 7 mm2. The mitral regurgitant volume is 15.75 ml.  Tricuspid Valve: The tricuspid valve is structurally normal. There is trace tricuspid regurgitation.  Pulmonic Valve: The pulmonic valve is structurally normal. There is trace pulmonic valve regurgitation.  Pericardium: There is no pericardial effusion noted.  Aorta: The aortic root is normal.  Systemic Veins: The inferior vena cava appears normal in size.  In comparison to the previous echocardiogram(s): Compared with study dated 5/16/2011,.        CONCLUSIONS:   1. Left ventricular ejection fraction is normal, by visual estimate at 55-60%.   2. Spectral Doppler shows a Grade I (impaired relaxation pattern) of left ventricular diastolic filling with normal left atrial filling pressure.   3. There is normal right ventricular global systolic function.   4. Moderate mitral valve regurgitation.   5. Aortic valve sclerosis.    Ejection Fractions:  EF   Date/Time Value Ref Range Status   01/24/2025 07:33 AM 58 %    01/20/2025 02:15 PM 58 %      Cath:  Cardiac  Catheterization Procedure 01/21/2025  Coronary Angiography Comments:  64-year-old woman with a history of bilateral carotid endarterectomy, uncontrolled hypertension, uncontrolled hyperlipidemia presents with unstable angina. Urgent heart cath recommended.     Fluoroscopy demonstrated moderate calcification of the coronary tree.     Left main: Short vessel. No significant disease.  LAD: Large vessel. Tortuous and calcified. In the mid LAD there is a complex 80 to 90% stenosis involving a large diagonal and moderate-sized septal. The remainder the LAD has moderate disease.  Circumflex: Moderate size vessel. Proximal 80% stenosis. Remainder the vessel is free of disease.  RCA: Dominant vessel. Ostial 90% stenosis.  LVEDP 21 mmHg. Left atrial angiography not performed.    Imaging:  XR chest 1 view   Final Result   1.  No significant change.        Signed by: Zeferino Rand 1/28/2025 8:34 AM   Dictation workstation:   [id number]      XR chest 1 view   Final Result   1. Slightly improved layering left-sided pleural effusions   surrounding atelectasis. Stable right basal lung atelectasis        Signed by: Rony Powell 1/27/2025 7:24 AM   Dictation workstation:   [id number]      XR chest 1 view   Final Result   Left-sided airspace consolidation, as above. Clinical correlation and   continued follow-up until clearing is recommended        MACRO:   None.        Signed by: Gerardo Oneill 1/26/2025 11:50 AM   Dictation workstation:   [id number]      XR abdomen 1 view   Final Result   Interval decrease in previously seen gaseous distension.        Nonspecific nonobstructive bowel gas pattern.        MACRO:   None        Signed by: Clayton Cantu 1/25/2025 11:38 PM   Dictation workstation:   [id number]      XR abdomen 1 view   Final Result   1. Gas distended stomach, otherwise nonobstructive gaseous pattern.        MACRO:   None        Signed by: Rony Powell 1/25/2025 7:01 AM   Dictation workstation:   [id number]      XR  chest 1 view   Final Result   1. No signal interval change.        Signed by: Rony Powell 1/25/2025 6:28 AM   Dictation workstation:   [id number]      XR chest 1 view   Final Result   1.  Postsurgical changes with medical devices as detailed. Possible   left pleural effusion and bilateral lower lobe airspace opacities   including a left retrocardiac opacity which could be related to   atelectasis or infection, similar to prior imaging.                  MACRO:   None        Signed by: Mariposa Garcia 1/24/2025 11:16 PM   Dictation workstation:   [id number]      XR chest 1 view   Final Result   1. Postoperative changes, as above.   2. Diffuse interstitial infiltrates bilaterally, as above. Clinical   correlation and continued follow-up until clearing is recommended.        MACRO:   None.        Signed by: Gerardo Oneill 1/24/2025 3:25 PM   Dictation workstation:   [id number]      Transesophageal Echo (BAKARI)   Final Result      Vascular US upper extremity arterial duplex bilateral   Final Result      Vascular US lower extremity vein mapping bilateral   Final Result      Vascular US carotid artery duplex bilateral   Final Result      Vascular US palmar arch evaluation   Final Result      Cardiac Catheterization Procedure   Final Result      Transthoracic Echo (TTE) Complete   Final Result      CT angio chest for pulmonary embolism   Final Result   1.No CT evidence of pulmonary embolism.   2.Normal aorta.   3.Small pulmonary nodule in the right upper lobe measuring 0.4   cm. As an isolated phenomena likely of little significance but   correlate with any malignancy history.   4.No evidence of pulmonary artery filling defect to suggest a   pulmonary embolism.   5.No evidence of aortic dissection or aneurysmal dilatation.   6.Small distal esophageal hiatal hernia.   7.Atherosclerotic changes of the abdominal aorta and its   branches. There is some narrowing of the proximal celiac axis   estimated be approximately 50-60%.  "  Signed by Albino Ritchie MD      XR chest 1 view   Final Result   No acute process.   Signed by Jo Veras MD                History of Present Illness: Jessy Garcia is a 75 y.o. female with a PMH significant for HTN, HLD, hypothyroidism, carotid stenosis s/p bilateral CEA, obesity who presented to [facility] on 1/20/2025 with NSTEMI. Patient reports the pain initially began approximately 4 days ago after shoveling snow. She reports the pain radiates across her chest she describes the pain as a \"hurt\"; severity 8/10 at its worst. She does report the pain is more noticeable with minimal exertion but also states the pain occurs at rest. She does report some associated shortness of breath, she denies any nausea, vomiting, diaphoresis. She denies any recent fevers chills, abdominal pain, headache, dizziness. She reports she is intermittently compliant with her blood pressure and cholesterol medications.      ED course: significant for HS tropnin (36), D-dimer 717 with CT PE negative for PE.     LHC performed 1/21 by Dr. Palacio and she was found to have triple vessel disease. Cardiac surgery was consulted for CABG evaluation.     Daily Events    1/22/25: No acute events overnight; currently working on staging an operative date. Continue inpatient admission given NSTEMI.      1/23/25: Continues with no acute anginal complaints overnight. Plan for patient to undergo bypass grafting tomorrow on 1/24/25 with Dr. Deyvi Gonzalez.     1/24/25: No acute events overnight; plan for OR today with Dr. Deyvi Gonzalez.     1/24/25 (Post-op): Patient arrived to the ICU in critically ill but stable condition. Currently intubate & sedated without need for vasopressors/dilators. Plan to reverse paralytic therapy and wean mechanical ventilation towards extubation.   - Vital Signs: HR 63, /59 (82), SpO2 94%   - Hemodynamics: CVP 9, CO 3.8, CI 2.1, SVR 1367  - Ventilator Settings: PRVC-AC / FiO2 50% " / PEEP 5 / RR 16   - Pump time: 90 minutes   Intake & Output:   - Initial R Pleural Chest Tube: 140mL  - Initial L Pleural Chest Tube: 20mL  - Initial Mediastinal Chest Tube: 40mL    1/25/2025:  Patient is POD#1 s/p CABGx4. She was extubated this morning to 6L NC. Currently remains on 0.02 of levo, will give albumin in hopes to wean off vasoactive gtts. L pleural chest tube: 50mL, R pleural chest tube: 330mL, Mediastinal chest tube: 95mL. Will get oob and into chair this morning. Likely will remove all chest tubes later today after ambulation.     - Current O2 Requirements: 6L NC     1/26/2025: No acute events overnight; patient has remained off norepinephrine. Currently weaning down on supplemental O2. Increasing diuresis and focusing on progressive mobility.     - Current O2 Requirements: 4L NC     1/27/2025: No acute events overnight. Continue to diurese with furosemide BID. Continue to wean supplemental O2.     - Current O2 Requirements: 2L NC     1/28/2025: Continues with no acute events overnight; patient weaned off supplemental O2. Continue progressive mobility. Plan for potential discharge to home tomorrow.    - Current O2 Requirements: RA    Assessment & Plan         Multivessel Coronary Artery Disease  - Patient admitted with [facility] on 1/20/25  --> [facility] on 1/21/25 revealed diffuse multivessel disease including the mid LAD, prox-Lcx and ostial RCA  - S/p CABG x 4 on 1/24/25 with Dr. Deyvi Gonzalez   --> LIMA-LAD, SVG-Diag, SVG-OM, RA-RCA  - Continue on ASA 81mg and Lovastatin 40mg   - Continue on metoprolol tartrate 12.5mg BID   ---> Advance as tolerated        Radial Grafting  - S/p radial harvest site  - Consider addition of nitrates as indicated        Mediastinal Incision  - PERRY, well approximated, C/D/I        Acute Post-Op Pain  - As expected   - Multimodal pain control   --> Scheduled: Acetaminophen, magnesium oxide 400mg QD, gabapentin 100mg TID, methocarbamol 500mg q6h   --> PRN: oxycodone &  fentanyl  - Bowel regimen while taking narcotics        Risk for Post-Op Arrhythmia  - Ventricular wires placed  - Discontinue V-wires prior to DC  - Telemetry until discharged       Acute Postoperative Respiratory Insufficiency   - As expected following CABG with post-op atelectasis  - Current O2 Requirements: RA  - Coughing and deep breathing exercises with Incentive spirometry  - Out of bed, early and aggressive mobilization with assistance  - Oxygen as needed, wean to keep saturation above 92%  - ICU intensivist/pulmonologist consulted  - Albuterol nebulizers as needed       Risk for Fluid Volume Overload  - Pre-Op Weight: 88.6 kg      1/25: 93kg --> Plan to start low dose diuresis today     1/26: 93.3 kg (net negative 319mL) --> IV furosemide 20mg BID      1/27: 93kg (net negative 600mL) --> continue furosemide 20mg BID     1/28: 93 kg (net negative 2779mL) --> PO furosemide 20mg BID   - Strict I& Os and daily weights         Acute Post-Op Blood-Loss Anemia  - Pre-Op H/H:  14/44.4  --> Immediate post-op: 9.9/29.8     1/25: 9.6/28.1     1/26: 8.1/25.2     1/27: 8.3/25.6     1/28: 7.8/23.9  - Monitor h/h in the initial post op period  - Multivitamin/iron tablet   - Daily CBC while in hospital       Post-Op Leukocytosis  - Pre-Op WBC: 7.3  --> Immediate post-op: 14.5     1/25: 9.6     1/26: 11.1     1/27: 8     1/28: 5.7  - Afebrile, consider elevations as reactive to surgery   - Post-op ATB Q12 for 48hrs  - Daily cbc while in hospital        Dyslipidemia  - Home Medications: Lovastatin 40mg   - Continued        Hypothyroidism   - Home Medications: Levothyroxine 50 mcg  - Continued        Risk for Post-op Hyperglycemia  - Home Medications: None  - HbA1c: 5.8  - Goal for BG <150 in the post-operative period  - SSI       Risk for Electrolyte Disturbances  - Optimize electrolytes per [facility] protocol      Bowel Regimen  - Senna-S BID, miralax daily  - PRN suppositories     Prophylaxis  - GI: PPI  - DVT: Duy-Hose  & enoxaparin   - MRSA: Negative  - PT/OT     Disposition  - CVICU (stepdown status)     Above patient seen and plan discussed with Dr. Deyvi Gnozalez, Plan as above.     PATSY Jauregui-CNP

## 2025-01-28 NOTE — NURSING NOTE
01/28/25 1000 Patient Navigator   I visited the patient again this am and she denies any questions regarding the information we discussed yesterday. She understands to contact me as needed and appreciated my follow up visit.  Maida BELLO, RN  Patient Navigator  Diabetes Care &   Stroke Educator

## 2025-01-28 NOTE — PROGRESS NOTES
Subjective   Jessy Garcia is a 75 y.o. female who presents with Hypertension and chest discomfort.    Patient seen examined at bedside this morning, no acute overnight events, no new complaints.    Objective   Vitals:    01/28/25 1200   BP:    Pulse: 80   Resp: 21   Temp:    SpO2: 92%      Physical Exam  Physical Exam:  Constitutional: well developed, awake, alert, no acute distress  ENMT: mucous membranes moist, EOMI, conjunctivae clear  Head/Neck: normocephalic, atraumatic; supple, trachea midline  Respiratory/Thorax: patent airways, CTAB; no wheezes, rales, or rhonchi  Cardiovascular: RRR, no murmur  Gastrointestinal: soft, nondistended, non-tender, bowel sounds appreciated  Extremities: palpable peripheral pulses, trace nonpitting edema  Neurological: AO x3, no focal deficits  Psychological: appropriate mood and behavior  Skin: warm and dry    Assessment/Plan     Jessy Garcia is a 75 year old female with PMH of HTN, HLD, hypothyroidism, carotid stenosis s/p bilateral CEA, obesity who presented to [facility] 1/20/20[facility] with NSTEMI. LHC performed 1/21 by Dr. Palacio and was found to have triple vessel disease. S/p coronary artery bypass x 4 (LIMA to LAD, Radial artery to RCA, SVG to OM, SVG to diagonal 2).    Update: Patient no longer has any ICU needs, ICU  signing off.     Neuro:  - Patient orientated to person place and time  - Analgesia: Gabapentin, PRN Tylenol, Oxycodone, Fentanyl per CTS  - Maintain normal sleep/wake cycle  - Avoid oversedating patient     Cardiovascular  #s/p CABG x 4 (LIMA to LAD, Radial artery to RCA, SVG to OM, SVG to diagonal 2)  #Hx HTN  #Hx HLD  - Last TTE 1/20/25 showed EF 55-60%  - Patient with postop hypotension after CABG surgery, improved  - Maintain MAP >70, wean as tolerated.  - On asa, lovastatin, metoprolol 12.5 mg  - Diuresis per CTS. Keep Mg>2, K>4.     Pulmonary:  - Patient with postop respiratory insufficiency, improving  - Patient extubated 1/25/25  - Chest tubes  remove 1/25/25  - Encourage incentive spirometer use   - Daily CXR  - Currently on 2.5 L nasal cannula     GI:  - Diet: Regular  - Prophylaxis: PPI     Renal:   - No current issues  - Nava in place, strict I&O's  - Trend renal panel, replete electrolytes PRN     Endocrine:  #Hypothyroidism  - Continue home levothyroxine  - SSI, maintain BG between 140-180s     Heme/Onc:  - DVT PPX: Lovenox  - Postop anemia as expected  - Trend H&H     ID:  - Prophylactic antibiotics per CTS  - CXR no acute pulmonary process     Skin/MSK:  - No acute issues     ICU CHECK LIST:   Antimicrobials:   Oxygen: 2.5L NC  Feeding: advance per CTS  Fluids:   Analgesia: per CTS  Sedation:   Thromboprophylaxis: Lovenox  Ulcer prophylaxis: PPI  Glycemic control: SSI  Bowel care: polly-colase, metamucil  Indwelling catheters: nava catheter  Lines: PIV's     Code Status: FULL     This is a preliminary note written by the resident. Please wait for attending addendum for finalization of note and recommendations.    Dr. Mor Viera  Internal Medicine

## 2025-01-28 NOTE — CARE PLAN
The patient's goals for the shift include  safety    The clinical goals for the shift include safety and comfort

## 2025-01-28 NOTE — PROGRESS NOTES
01/28/25 1055   Discharge Planning   Expected Discharge Disposition Home H   Patient Choice   Provider Choice list and CMS website (https://medicare.gov/care-compare#search) for post-acute Quality and Resource Measure Data were provided and reviewed with: Patient   Patient / Family choosing to utilize agency / facility established prior to hospitalization No   Intensity of Service   Intensity of Service 0-30 min     This TCC met with patient and discussed therapy scores with patient.  AMPAC PT 18, OT 17.  HHC list printed and reviewed with patient.  Will check back with patient for choices.  Care transitions to follow.    1500pm:  This TCC met with patient to see if she has choices for HHC.  Patient stated that she is unsure of what HHC entails.  This TCC explained the purpose of HHC to the patient, patient verbalized understanding.  Stated that she will look over the choices and let care transitions know.  Will continue to follow.

## 2025-01-28 NOTE — PROGRESS NOTES
Cardiology Progress    Impression:  Unstable angina.    Postop CABG x 4: LIMA-LAD, radial-RCA, SVG-OM, SVG-D2  Hypertension  Uncontrolled hyperlipidemia.  Intolerant to multiple statins.  Obesity  Carotid disease.  Bilateral carotid endarterectomy  Plan:  Continue gentle diuresis  DAPT, statin, beta-blocker as ordered  [id number] at discharge   Discharge planning  Follow-up in the office 2 to 3 weeks  HPI:  Continues to improve.  Still on some oxygen.  No new problems.  Meds:  Scheduled medications  acetaminophen, 650 mg, oral, q6h  aspirin, 81 mg, oral, Daily  clopidogrel, 75 mg, oral, Daily  enoxaparin, 40 mg, subcutaneous, Daily  ferrous sulfate (325 mg ferrous sulfate), 65 mg of iron, oral, Daily with breakfast  furosemide, 20 mg, intravenous, BID  gabapentin, 100 mg, oral, TID  insulin lispro, 0-15 Units, subcutaneous, Before meals & nightly  levothyroxine, 50 mcg, oral, Daily  lidocaine, 1 patch, transdermal, q24h  lovastatin, 40 mg, oral, Daily  magnesium oxide, 400 mg, oral, Daily  methocarbamol, 500 mg, oral, q8h CHRIS  metoprolol tartrate, 12.5 mg, oral, BID  multivitamin with minerals, 1 tablet, oral, Daily  mupirocin, 1 Application, Each Nostril, BID  oxygen, , inhalation, Continuous - Inhalation  polyethylene glycol, 17 g, oral, Daily  sennosides-docusate sodium, 2 tablet, oral, BID      Continuous medications     PRN medications  PRN medications: fentaNYL, guaiFENesin, magnesium sulfate, magnesium sulfate, melatonin, metoclopramide **OR** metoclopramide, naloxone, ondansetron ODT **OR** ondansetron, oxyCODONE, oxyCODONE, potassium chloride CR **OR** potassium chloride, potassium chloride CR **OR** potassium chloride    Physical exam:  Vitals:    01/28/25 1100   BP:    Pulse: 73   Resp: 15   Temp:    SpO2: 97%      No JVD.  Decreased breath sounds at the bases.  Mild edema.  EKG:  Telemetry shows sinus rhythm.  Echo:  Normal EF.  Moderate MR.  Labs:  Lab Results   Component Value Date    WBC 5.7 01/28/2025     HGB 7.8 (L) 01/28/2025    HCT 23.9 (L) 01/28/2025     01/28/2025    CHOL 398 (H) 01/20/2025    TRIG 253 (H) 01/20/2025    HDL 62.3 01/20/2025    ALT 16 01/20/2025    AST 17 01/20/2025     01/28/2025    K 3.6 01/28/2025     01/28/2025    CREATININE 0.68 01/28/2025    BUN 18 01/28/2025    CO2 32 01/28/2025    TSH 7.90 (H) 01/20/2025    INR 1.4 (H) 01/24/2025    HGBA1C 5.8 (H) 01/20/2025     par

## 2025-01-29 ENCOUNTER — PHARMACY VISIT (OUTPATIENT)
Dept: PHARMACY | Facility: CLINIC | Age: 75
End: 2025-01-29
Payer: MEDICARE

## 2025-01-29 ENCOUNTER — DOCUMENTATION (OUTPATIENT)
Dept: HOME HEALTH SERVICES | Facility: HOME HEALTH | Age: 75
End: 2025-01-29
Payer: COMMERCIAL

## 2025-01-29 ENCOUNTER — HOME HEALTH ADMISSION (OUTPATIENT)
Dept: HOME HEALTH SERVICES | Facility: HOME HEALTH | Age: 75
End: 2025-01-29
Payer: MEDICARE

## 2025-01-29 ENCOUNTER — APPOINTMENT (OUTPATIENT)
Dept: RADIOLOGY | Facility: HOSPITAL | Age: 75
DRG: 234 | End: 2025-01-29
Payer: MEDICARE

## 2025-01-29 VITALS
DIASTOLIC BLOOD PRESSURE: 55 MMHG | SYSTOLIC BLOOD PRESSURE: 113 MMHG | TEMPERATURE: 98.4 F | WEIGHT: 201 LBS | OXYGEN SATURATION: 90 % | RESPIRATION RATE: 19 BRPM | HEIGHT: 60 IN | BODY MASS INDEX: 39.46 KG/M2 | HEART RATE: 79 BPM

## 2025-01-29 DIAGNOSIS — Z95.1 S/P CABG X 4: ICD-10-CM

## 2025-01-29 LAB
ALBUMIN SERPL BCP-MCNC: 3.3 G/DL (ref 3.4–5)
ANION GAP SERPL CALC-SCNC: 12 MMOL/L (ref 10–20)
BUN SERPL-MCNC: 16 MG/DL (ref 6–23)
CALCIUM SERPL-MCNC: 8.3 MG/DL (ref 8.6–10.3)
CHLORIDE SERPL-SCNC: 102 MMOL/L (ref 98–107)
CO2 SERPL-SCNC: 31 MMOL/L (ref 21–32)
CREAT SERPL-MCNC: 0.69 MG/DL (ref 0.5–1.05)
EGFRCR SERPLBLD CKD-EPI 2021: >90 ML/MIN/1.73M*2
ERYTHROCYTE [DISTWIDTH] IN BLOOD BY AUTOMATED COUNT: 14.5 % (ref 11.5–14.5)
GLUCOSE SERPL-MCNC: 107 MG/DL (ref 74–99)
HCT VFR BLD AUTO: 25.1 % (ref 36–46)
HGB BLD-MCNC: 8 G/DL (ref 12–16)
MAGNESIUM SERPL-MCNC: 2.07 MG/DL (ref 1.6–2.4)
MCH RBC QN AUTO: 28.8 PG (ref 26–34)
MCHC RBC AUTO-ENTMCNC: 31.9 G/DL (ref 32–36)
MCV RBC AUTO: 90 FL (ref 80–100)
NRBC BLD-RTO: 0 /100 WBCS (ref 0–0)
PHOSPHATE SERPL-MCNC: 2.4 MG/DL (ref 2.5–4.9)
PLATELET # BLD AUTO: 242 X10*3/UL (ref 150–450)
POTASSIUM SERPL-SCNC: 3.5 MMOL/L (ref 3.5–5.3)
RBC # BLD AUTO: 2.78 X10*6/UL (ref 4–5.2)
SODIUM SERPL-SCNC: 141 MMOL/L (ref 136–145)
WBC # BLD AUTO: 5.6 X10*3/UL (ref 4.4–11.3)

## 2025-01-29 PROCEDURE — 36415 COLL VENOUS BLD VENIPUNCTURE: CPT | Performed by: NURSE PRACTITIONER

## 2025-01-29 PROCEDURE — 99239 HOSP IP/OBS DSCHRG MGMT >30: CPT | Performed by: NURSE PRACTITIONER

## 2025-01-29 PROCEDURE — 2500000001 HC RX 250 WO HCPCS SELF ADMINISTERED DRUGS (ALT 637 FOR MEDICARE OP)

## 2025-01-29 PROCEDURE — 2500000002 HC RX 250 W HCPCS SELF ADMINISTERED DRUGS (ALT 637 FOR MEDICARE OP, ALT 636 FOR OP/ED): Performed by: NURSE PRACTITIONER

## 2025-01-29 PROCEDURE — 85027 COMPLETE CBC AUTOMATED: CPT | Performed by: NURSE PRACTITIONER

## 2025-01-29 PROCEDURE — 2500000001 HC RX 250 WO HCPCS SELF ADMINISTERED DRUGS (ALT 637 FOR MEDICARE OP): Performed by: NURSE PRACTITIONER

## 2025-01-29 PROCEDURE — 2500000004 HC RX 250 GENERAL PHARMACY W/ HCPCS (ALT 636 FOR OP/ED): Performed by: NURSE PRACTITIONER

## 2025-01-29 PROCEDURE — 83735 ASSAY OF MAGNESIUM: CPT | Performed by: NURSE PRACTITIONER

## 2025-01-29 PROCEDURE — 71045 X-RAY EXAM CHEST 1 VIEW: CPT

## 2025-01-29 PROCEDURE — 71045 X-RAY EXAM CHEST 1 VIEW: CPT | Performed by: RADIOLOGY

## 2025-01-29 PROCEDURE — RXMED WILLOW AMBULATORY MEDICATION CHARGE

## 2025-01-29 PROCEDURE — 80069 RENAL FUNCTION PANEL: CPT | Performed by: NURSE PRACTITIONER

## 2025-01-29 RX ORDER — CLOPIDOGREL BISULFATE 75 MG/1
75 TABLET ORAL DAILY
Qty: 30 TABLET | Refills: 11 | Status: SHIPPED | OUTPATIENT
Start: 2025-01-30 | End: 2026-01-30

## 2025-01-29 RX ORDER — FERROUS SULFATE 325(65) MG
65 TABLET ORAL
Qty: 30 TABLET | Refills: 0 | Status: SHIPPED | OUTPATIENT
Start: 2025-01-30 | End: 2025-03-01

## 2025-01-29 RX ORDER — ACETAMINOPHEN 325 MG/1
650 TABLET ORAL EVERY 6 HOURS PRN
Start: 2025-01-29

## 2025-01-29 RX ORDER — FUROSEMIDE 20 MG/1
20 TABLET ORAL DAILY
Qty: 30 TABLET | Refills: 0 | Status: SHIPPED | OUTPATIENT
Start: 2025-01-29 | End: 2025-02-28

## 2025-01-29 RX ORDER — METOPROLOL TARTRATE 25 MG/1
25 TABLET, FILM COATED ORAL 2 TIMES DAILY
Qty: 60 TABLET | Refills: 3 | Status: SHIPPED | OUTPATIENT
Start: 2025-01-29 | End: 2025-05-29

## 2025-01-29 RX ORDER — OXYCODONE HYDROCHLORIDE 5 MG/1
5 TABLET ORAL EVERY 6 HOURS PRN
Qty: 15 TABLET | Refills: 0 | Status: SHIPPED | OUTPATIENT
Start: 2025-01-29 | End: 2025-02-05

## 2025-01-29 RX ORDER — METHOCARBAMOL 500 MG/1
500 TABLET, FILM COATED ORAL EVERY 8 HOURS PRN
Qty: 20 TABLET | Refills: 0 | Status: SHIPPED | OUTPATIENT
Start: 2025-01-29 | End: 2025-02-28

## 2025-01-29 RX ORDER — NAPROXEN SODIUM 220 MG/1
81 TABLET, FILM COATED ORAL DAILY
Qty: 30 TABLET | Refills: 11 | Status: SHIPPED | OUTPATIENT
Start: 2025-01-30 | End: 2026-01-30

## 2025-01-29 RX ADMIN — LEVOTHYROXINE SODIUM 50 MCG: 0.05 TABLET ORAL at 06:51

## 2025-01-29 RX ADMIN — ENOXAPARIN SODIUM 40 MG: 40 INJECTION SUBCUTANEOUS at 09:33

## 2025-01-29 RX ADMIN — MAGNESIUM OXIDE TAB 400 MG (241.3 MG ELEMENTAL MG) 400 MG: 400 (241.3 MG) TAB at 09:33

## 2025-01-29 RX ADMIN — LOVASTATIN 40 MG: 40 TABLET ORAL at 09:35

## 2025-01-29 RX ADMIN — GABAPENTIN 100 MG: 100 CAPSULE ORAL at 09:33

## 2025-01-29 RX ADMIN — METOPROLOL TARTRATE 12.5 MG: 25 TABLET, FILM COATED ORAL at 09:33

## 2025-01-29 RX ADMIN — METHOCARBAMOL 500 MG: 500 TABLET ORAL at 06:51

## 2025-01-29 RX ADMIN — OXYCODONE HYDROCHLORIDE 5 MG: 5 TABLET ORAL at 04:04

## 2025-01-29 RX ADMIN — OXYCODONE HYDROCHLORIDE 5 MG: 5 TABLET ORAL at 09:34

## 2025-01-29 RX ADMIN — FERROUS SULFATE TAB 325 MG (65 MG ELEMENTAL FE) 325 MG: 325 (65 FE) TAB at 09:33

## 2025-01-29 RX ADMIN — ASPIRIN 81 MG CHEWABLE TABLET 81 MG: 81 TABLET CHEWABLE at 09:33

## 2025-01-29 RX ADMIN — FUROSEMIDE 20 MG: 20 TABLET ORAL at 09:33

## 2025-01-29 RX ADMIN — ACETAMINOPHEN 650 MG: 325 TABLET, FILM COATED ORAL at 09:33

## 2025-01-29 RX ADMIN — CLOPIDOGREL BISULFATE 75 MG: 75 TABLET ORAL at 09:33

## 2025-01-29 RX ADMIN — Medication 1 TABLET: at 09:34

## 2025-01-29 ASSESSMENT — PAIN DESCRIPTION - DESCRIPTORS: DESCRIPTORS: ACHING

## 2025-01-29 ASSESSMENT — PAIN SCALES - GENERAL
PAINLEVEL_OUTOF10: 4
PAINLEVEL_OUTOF10: 5 - MODERATE PAIN
PAINLEVEL_OUTOF10: 0 - NO PAIN
PAINLEVEL_OUTOF10: 0 - NO PAIN

## 2025-01-29 ASSESSMENT — PAIN - FUNCTIONAL ASSESSMENT
PAIN_FUNCTIONAL_ASSESSMENT: 0-10

## 2025-01-29 ASSESSMENT — PAIN DESCRIPTION - LOCATION: LOCATION: CHEST

## 2025-01-29 NOTE — DISCHARGE SUMMARY
"Discharge Diagnosis  Chest pain, unspecified type    Test Results Pending At Discharge  Pending Labs       No current pending labs.            Hospital Course   History of Present Illness: Jessy Garcia is a 75 y.o. female with a PMH significant for HTN, HLD, hypothyroidism, carotid stenosis s/p bilateral CEA, obesity who presented to [facility] on 1/20/20[facility] with NSTEMI. Patient reports the pain initially began approximately 4 days ago after shoveling snow. She reports the pain radiates across her chest she describes the pain as a \"hurt\"; severity 8/10 at its worst. She does report the pain is more noticeable with minimal exertion but also states the pain occurs at rest. She does report some associated shortness of breath, she denies any nausea, vomiting, diaphoresis. She denies any recent fevers chills, abdominal pain, headache, dizziness. She reports she is intermittently compliant with her blood pressure and cholesterol medications.      ED course: significant for HS tropnin (36), D-dimer 717 with CT PE negative for PE.     LHC performed 1/21 by Dr. Palacio and she was found to have triple vessel disease. Cardiac surgery was consulted for CABG evaluation.      Daily Events     1/22/25: No acute events overnight; currently working on staging an operative date. Continue inpatient admission given NSTEMI.       1/23/25: Continues with no acute anginal complaints overnight. Plan for patient to undergo bypass grafting tomorrow on 1/24/25 with Dr. Deyvi Gonzalez.      1/24/25: No acute events overnight; plan for OR today with Dr. Deyvi Gonzalez.      1/24/25 (Post-op): Patient arrived to the ICU in critically ill but stable condition. Currently intubate & sedated without need for vasopressors/dilators. Plan to reverse paralytic therapy and wean mechanical ventilation towards extubation.   - Vital Signs: HR 63, /59 (82), SpO2 94%   - Hemodynamics: CVP 9, CO 3.8, CI 2.1, SVR 1367  - " Ventilator Settings: PRVC-AC / FiO2 50% / PEEP 5 / RR 16   - Pump time: 90 minutes   Intake & Output:   - Initial R Pleural Chest Tube: 140mL  - Initial L Pleural Chest Tube: 20mL  - Initial Mediastinal Chest Tube: 40mL     1/25/2025:  Patient is POD#1 s/p CABGx4. She was extubated this morning to 6L NC. Currently remains on 0.02 of levo, will give albumin in hopes to wean off vasoactive gtts. L pleural chest tube: 50mL, R pleural chest tube: 330mL, Mediastinal chest tube: 95mL. Will get oob and into chair this morning. Likely will remove all chest tubes later today after ambulation.      - Current O2 Requirements: 6L NC      1/26/2025: No acute events overnight; patient has remained off norepinephrine. Currently weaning down on supplemental O2. Increasing diuresis and focusing on progressive mobility.      - Current O2 Requirements: 4L NC      1/27/2025: No acute events overnight. Continue to diurese with furosemide BID. Continue to wean supplemental O2.      - Current O2 Requirements: 2L NC      1/28/2025: Continues with no acute events overnight; patient weaned off supplemental O2. Continue progressive mobility. Plan for potential discharge to home tomorrow.     - Current O2 Requirements: RA    1/29/2025: Patient has continued to recover well in the immediate post-op period. On RA, ambulating independently, pain controlled. No post-op issues/concerns.     Plan for discharge to home today with follow up as scheduled in the OP setting.      Assessment & Plan           Multivessel Coronary Artery Disease  - Patient admitted with [facility] on 1/20/25  --> [facility] on 1/21/25 revealed diffuse multivessel disease including the mid LAD, prox-Lcx and ostial RCA  - S/p CABG x 4 on 1/24/25 with Dr. Deyvi Gonzalez   --> LIMA-LAD, SVG-Diag, SVG-OM, RA-RCA  - Continue on ASA 81mg and Lovastatin 40mg, and Metoprolol tartrate 25mg BID   - Follow up with cardiac surgery int the OP setting        Mediastinal Incision  - PERRY, well  approximated, C/D/I         Acute Post-Op Pain  - As expected   - Multimodal pain control   --> Scheduled: Acetaminophen, magnesium oxide 400mg QD, gabapentin 100mg BID   --> PRN: oxycodone 5mg q6h for 7 days (OARRS verified) & fmethocarbamol         Risk for Post-Op Arrhythmia  - Ventricular wires placed  - Discontinue V-wires prior to DC  - Telemetry until discharged        Acute Postoperative Respiratory Insufficiency   - As expected following CABG with post-op atelectasis  - Current O2 Requirements: RA  - Coughing and deep breathing exercises with Incentive spirometry        Risk for Fluid Volume Overload  - Pre-Op Weight: 88.6 kg      1/25: 93kg --> Plan to start low dose diuresis today     1/26: 93.3 kg (net negative 319mL) --> IV furosemide 20mg BID      1/27: 93kg (net negative 600mL) --> continue furosemide 20mg BID     1/28: 93 kg (net negative 2779mL) --> PO furosemide 20mg BID      1/29: 91.2kg (net negative 3329mL)  - Home with PO furosemide 20mg every day   - Continue daily weights         Acute Post-Op Blood-Loss Anemia  - Pre-Op H/H:  14/44.4  --> Immediate post-op: 9.9/29.8     1/25: 9.6/28.1     1/26: 8.1/25.2     1/27: 8.3/25.6     1/28: 7.8/23.9     1/29: 8/24.1  - Iron tablet for 30 days            Post-Op Leukocytosis  - Pre-Op WBC: 7.3  --> Immediate post-op: 14.5     1/25: 9.6     1/26: 11.1     1/27: 8     1/28: 5.7     1/29: 5.0        Dyslipidemia  - Home Medications: Lovastatin 40mg   - Continued         Hypothyroidism   - Home Medications: Levothyroxine 50 mcg  - Continued      Disposition  - Home with home healthcare      Above patient seen and plan discussed with Dr. Deyvi Gonzalez, Plan as above.     Pertinent Physical Exam At Time of Discharge  Physical Exam  Vitals and nursing note reviewed.   Constitutional:       General: She is not in acute distress.     Appearance: Normal appearance. She is not ill-appearing.   HENT:      Head: Normocephalic and atraumatic.      Nose: Nose normal.       Mouth/Throat:      Mouth: Mucous membranes are moist.      Pharynx: Oropharynx is clear.   Eyes:      Extraocular Movements: Extraocular movements intact.      Conjunctiva/sclera: Conjunctivae normal.      Pupils: Pupils are equal, round, and reactive to light.   Cardiovascular:      Rate and Rhythm: Normal rate and regular rhythm.      Pulses: Normal pulses.      Heart sounds: Normal heart sounds, S1 normal and S2 normal. No murmur heard.     No systolic murmur is present.      No friction rub. No gallop.   Pulmonary:      Effort: Pulmonary effort is normal.      Breath sounds: Normal breath sounds.   Abdominal:      General: Abdomen is flat. Bowel sounds are normal. There is no distension.      Palpations: Abdomen is soft.   Musculoskeletal:         General: Normal range of motion.      Cervical back: Normal range of motion and neck supple.      Right lower leg: No edema.      Left lower leg: No edema.   Skin:     General: Skin is warm and dry.      Capillary Refill: Capillary refill takes less than 2 seconds.      Findings: No lesion.      Nails: There is no clubbing.      Comments: Mediastinal incision well approximated, C/D/I   Sternal Stability    Neurological:      General: No focal deficit present.      Mental Status: She is alert and oriented to person, place, and time. Mental status is at baseline.      Cranial Nerves: Cranial nerves 2-12 are intact.      Sensory: Sensation is intact.      Motor: Motor function is intact.   Psychiatric:         Attention and Perception: Attention and perception normal.         Mood and Affect: Mood normal.         Speech: Speech normal.         Behavior: Behavior normal.         Thought Content: Thought content normal.         Cognition and Memory: Cognition and memory normal.         Judgment: Judgment normal.         Home Medications     Medication List      START taking these medications     acetaminophen 325 mg tablet; Commonly known as: Tylenol; Take 2 tablets    (650 mg) by mouth every 6 hours if needed for moderate pain (4 - 6).   aspirin 81 mg chewable tablet; Chew and swallow 1 tablet (81 mg) by   mouth once daily.; Start taking on: January 30, 2025   clopidogrel 75 mg tablet; Commonly known as: Plavix; Take 1 tablet (75   mg) by mouth once daily.; Start taking on: January 30, 2025   ferrous sulfate (325 mg ferrous sulfate) tablet; Take 1 tablet (325 mg)   by mouth once daily with breakfast.; Start taking on: January 30, 2025   furosemide 20 mg tablet; Commonly known as: Lasix; Take 1 tablet (20 mg)   by mouth once daily.   methocarbamol 500 mg tablet; Commonly known as: Robaxin; Take 1 tablet   (500 mg) by mouth every 8 hours if needed for muscle spasms.   oxyCODONE 5 mg immediate release tablet; Commonly known as: Roxicodone;   Take 1 tablet (5 mg) by mouth every 6 hours if needed for severe pain (7 -   10) for up to 7 days.     CHANGE how you take these medications     metoprolol tartrate 25 mg tablet; Commonly known as: Lopressor; Take 1   tablet (25 mg) by mouth 2 times a day.; What changed: medication strength,   how much to take     CONTINUE taking these medications     gabapentin 100 mg capsule; Commonly known as: Neurontin   lovastatin 40 mg tablet; Commonly known as: Mevacor   Synthroid 50 mcg tablet; Generic drug: levothyroxine       Outpatient Follow-Up  No future appointments.    Adam Dumas, APRN-CNP

## 2025-01-29 NOTE — PROGRESS NOTES
Cardiology Progress    Impression:  Unstable angina.    Postop CABG x 4: LIMA-LAD, radial-RCA, SVG-OM, SVG-D2  Hypertension  Uncontrolled hyperlipidemia.  Intolerant to multiple statins.  Obesity  Carotid disease.  Bilateral carotid endarterectomy  Plan:  OK for DC  Follow-up w me 2-3 weeks  HPI:  Doing well.  Okay for discharge.  Meds:  Scheduled medications  acetaminophen, 650 mg, oral, q6h  aspirin, 81 mg, oral, Daily  clopidogrel, 75 mg, oral, Daily  enoxaparin, 40 mg, subcutaneous, Daily  ferrous sulfate (325 mg ferrous sulfate), 65 mg of iron, oral, Daily with breakfast  furosemide, 20 mg, oral, BID  gabapentin, 100 mg, oral, TID  levothyroxine, 50 mcg, oral, Daily  lidocaine, 1 patch, transdermal, q24h  lovastatin, 40 mg, oral, Daily  magnesium oxide, 400 mg, oral, Daily  methocarbamol, 500 mg, oral, q8h CHRIS  metoprolol tartrate, 12.5 mg, oral, BID  multivitamin with minerals, 1 tablet, oral, Daily  oxygen, , inhalation, Continuous - Inhalation  polyethylene glycol, 17 g, oral, Daily  sennosides-docusate sodium, 2 tablet, oral, BID      Continuous medications     PRN medications  PRN medications: fentaNYL, guaiFENesin, magnesium sulfate, magnesium sulfate, melatonin, metoclopramide **OR** metoclopramide, naloxone, ondansetron ODT **OR** ondansetron, oxyCODONE, oxyCODONE, potassium chloride CR **OR** potassium chloride, potassium chloride CR **OR** potassium chloride    Physical exam:  Vitals:    01/29/25 1200   BP:    Pulse: 79   Resp: 19   Temp:    SpO2: 90%      No JVD.  Chest clear.  No edema.  EKG:  Telemetry shows sinus rhythm  Echo:  Normal EF.  Moderate MR.  Labs:  Lab Results   Component Value Date    WBC 5.6 01/29/2025    HGB 8.0 (L) 01/29/2025    HCT 25.1 (L) 01/29/2025     01/29/2025    CHOL 398 (H) 01/20/2025    TRIG 253 (H) 01/20/2025    HDL 62.3 01/20/2025    ALT 16 01/20/2025    AST 17 01/20/2025     01/29/2025    K 3.5 01/29/2025     01/29/2025    CREATININE 0.69 01/29/2025     BUN 16 01/29/2025    CO2 31 01/29/2025    TSH 7.90 (H) 01/20/2025    INR 1.4 (H) 01/24/2025    HGBA1C 5.8 (H) 01/20/2025     par

## 2025-01-29 NOTE — NURSING NOTE
Cardiac Rehab - Phase I    Discipline: Nursing     Topic: Cardiac Rehab    Description:  Cardiac Rehabilitation Education done by cardiopulmonary rehabilitation staff with good understanding:  y   Qualifying cardiac diagnosis/procedure: y  Addressed patient's questions regarding cardiac diagnosis/procedure: y    Healthy nutrition education done: y  Cardiac Healthy lifestyle education done: y  Does patient meet criteria for program enrollment: y  Benefits of participation in Cardiac Rehabilitation program discussed with patient: y  Is patient interested in attending Cardiac Rehabilitation: y  Does patient have transportation to attend the program: y  Cardiac Rehabilitation pamphlet given to patient: y  Insurance coverage verification process explained to patient: y  Initial interview scheduled: n  Contact information for [facility] [place] Cardiac Rehabilitation given to patient: y    Comments: CABG   UpToDate Patient Education Handouts Provided:  - Cardiac Rehab Information  - Heart Healthy Diet  - 2g Na+ diet.

## 2025-01-29 NOTE — NURSING NOTE
Cardiac and CHF discharge instructions reviewed with patient.      Discussed wound care to area: No concern for infection present upon visual nursing assessment. Aware to leave PERRY and cleanse with mild soap and water daily, pat dry. Discussed monitoring daily weights, BP, and HR. adequate nutrition and hydration intake. Also discussed utilizing incentive spirometer and increasing protein and vitamin C intake. Recommended High Protein Ensure. Encouraged the use of pulse oximeter as well and discussed importance of deep breathing while maintaining an SPO2 level of 95% or greater. Medication assessment performed. States pain currently well controlled with regimen ordered. Advised to call me as needed with questions or concerns. Patient verbalized understanding.     Other Topics reviewed:   Incision care: discussed signs and symptoms of infection  Medication Education : Plavix, BB, ASA, Statin, ACE.   Driving restrictions: No driving for 30 days (or until cleared by Dr. Gonzalez)   Cardiac Rehab: To be arranged by Cardiology once surgeon clears patient from surgery.   Home Health- advised to call me if HC does not call after discharge.  Doctor appointments: Aware of CXR needed prior office visit with surgeon.   Diet: Advised to adhere to a heart healthy diet, regular exercise habits, avoidance of tobacco products, and maintenance of a healthy weight as they are crucial components to heart disease risk reduction.   BP/WT/HR: Daily monitoring of weight and BP. Made aware of when to call.      Epicardial wires cut today by GIA Medina.  Cardiac Rehab information given to patient.   Recovery after coronary artery bypass surgery  - Heart attack recovery

## 2025-01-29 NOTE — NURSING NOTE
01/29/25 0900 Patient Navigator  I visited the patient again this morning. She denies any questions regarding the information we discussed in my previous visit. She appreciated my visit & understands to contact me as needed.  Maida BELLO, RN  Patient Navigator  Diabetes Care &   Stroke Educator

## 2025-01-29 NOTE — PROGRESS NOTES
01/29/25 1142   Discharge Planning   Home or Post Acute Services In home services   Type of Home Care Services Home nursing visits;Home OT;Home PT   Expected Discharge Disposition Home H     TCC in to speak with patient, [facility]  Care transitions to follow.

## 2025-01-29 NOTE — PROGRESS NOTES
Occupational Therapy                 Therapy Communication Note    Patient Name: Jessy Garcia  MRN: [id number]  Department:   Room: [address] [place] [address]  Today's Date: 1/29/2025     Discipline: Occupational Therapy          Missed Visit Reason:  Patient declined as she was discharging and just waiting on her transportation.    Missed Time: Attempt    Comment:

## 2025-01-29 NOTE — HH CARE COORDINATION
Home Care received a Referral for Nursing, Physical Therapy, and Occupational Therapy. We have processed the referral for a Start of Care on 01/30/2025.     If you have any questions or concerns, please feel free to contact us at [phone]. Follow the prompts, enter your five digit zip code, and you will be directed to your care team on WE[facility] 3.

## 2025-01-30 LAB
ATRIAL RATE: 68 BPM
ATRIAL RATE: 69 BPM
DIASTOLIC BLOOD PRESSURE: 79 MMHG
P AXIS: 56 DEGREES
P AXIS: 75 DEGREES
P OFFSET: 188 MS
P ONSET: 139 MS
PR INTERVAL: 160 MS
PR INTERVAL: 201 MS
Q ONSET: 219 MS
Q ONSET: 252 MS
QRS COUNT: 10 BEATS
QRS COUNT: 11 BEATS
QRS DURATION: 90 MS
QRS DURATION: 91 MS
QT INTERVAL: 398 MS
QT INTERVAL: 418 MS
QTC CALCULATION(BAZETT): 426 MS
QTC CALCULATION(BAZETT): 438 MS
QTC FREDERICIA: 417 MS
QTC FREDERICIA: 431 MS
R AXIS: 28 DEGREES
R AXIS: 85 DEGREES
SYSTOLIC BLOOD PRESSURE: 166 MMHG
T AXIS: 24 DEGREES
T AXIS: 27 DEGREES
T OFFSET: 418 MS
T OFFSET: 461 MS
VENTRICULAR RATE: 66 BPM
VENTRICULAR RATE: 69 BPM

## 2025-02-03 PROBLEM — L65.0 TELOGEN EFFLUVIUM: Status: RESOLVED | Noted: 2022-04-20 | Resolved: 2025-02-03

## 2025-02-03 PROBLEM — L65.9 NONSCARRING HAIR LOSS, UNSPECIFIED: Status: ACTIVE | Noted: 2022-04-20

## 2025-02-03 PROBLEM — M25.569 PAIN IN JOINT, LOWER LEG: Status: ACTIVE | Noted: 2024-06-14

## 2025-02-03 PROBLEM — L82.1 OTHER SEBORRHEIC KERATOSIS: Status: ACTIVE | Noted: 2022-04-20

## 2025-02-03 PROBLEM — I77.9 BILATERAL CAROTID ARTERY DISEASE (CMS-HCC): Status: ACTIVE | Noted: 2017-02-20

## 2025-02-03 PROBLEM — B02.9 SHINGLES RASH: Status: ACTIVE | Noted: 2017-08-29

## 2025-02-03 PROBLEM — R73.03 PREDIABETES: Status: ACTIVE | Noted: 2024-06-14

## 2025-02-03 PROBLEM — J30.0 CHRONIC VASOMOTOR RHINITIS: Status: ACTIVE | Noted: 2017-08-03

## 2025-02-03 PROBLEM — L64.9 ANDROGENIC ALOPECIA: Status: ACTIVE | Noted: 2022-04-20

## 2025-02-03 PROBLEM — E03.9 HYPOTHYROIDISM: Status: ACTIVE | Noted: 2024-08-25

## 2025-02-03 PROBLEM — E55.9 VITAMIN D DEFICIENCY: Status: ACTIVE | Noted: 2017-02-20

## 2025-02-03 PROBLEM — I65.29 ASYMPTOMATIC CAROTID ARTERY STENOSIS: Status: ACTIVE | Noted: 2025-02-03

## 2025-02-03 PROBLEM — I25.10 ATHEROSCLEROTIC CARDIOVASCULAR DISEASE: Status: ACTIVE | Noted: 2025-02-03

## 2025-02-03 PROBLEM — E66.812 OBESITY, CLASS II, BMI 35-39.9: Status: ACTIVE | Noted: 2024-06-14

## 2025-02-03 PROBLEM — L65.0 TELOGEN EFFLUVIUM: Status: ACTIVE | Noted: 2022-04-20

## 2025-02-03 PROBLEM — J30.89 ALLERGIC RHINITIS DUE TO DUST MITE: Status: ACTIVE | Noted: 2017-08-03

## 2025-02-03 PROBLEM — H10.10 ALLERGIC CONJUNCTIVITIS: Status: ACTIVE | Noted: 2017-08-03

## 2025-02-04 ENCOUNTER — HOME CARE VISIT (OUTPATIENT)
Dept: HOME HEALTH SERVICES | Facility: HOME HEALTH | Age: 75
End: 2025-02-04

## 2025-02-07 ENCOUNTER — HOME CARE VISIT (OUTPATIENT)
Dept: HOME HEALTH SERVICES | Facility: HOME HEALTH | Age: 75
End: 2025-02-07

## 2025-02-09 ENCOUNTER — HOME CARE VISIT (OUTPATIENT)
Dept: HOME HEALTH SERVICES | Facility: HOME HEALTH | Age: 75
End: 2025-02-09
Payer: MEDICARE

## 2025-02-09 ENCOUNTER — HOME CARE VISIT (OUTPATIENT)
Dept: HOME HEALTH SERVICES | Facility: HOME HEALTH | Age: 75
End: 2025-02-09

## 2025-02-09 VITALS
TEMPERATURE: 98.7 F | SYSTOLIC BLOOD PRESSURE: 128 MMHG | DIASTOLIC BLOOD PRESSURE: 86 MMHG | RESPIRATION RATE: 12 BRPM | OXYGEN SATURATION: 99 % | HEART RATE: 87 BPM

## 2025-02-09 PROCEDURE — G0299 HHS/HOSPICE OF RN EA 15 MIN: HCPCS | Mod: HHH

## 2025-02-09 PROCEDURE — 169592 NO-PAY CLAIM PROCEDURE

## 2025-02-09 ASSESSMENT — ACTIVITIES OF DAILY LIVING (ADL)
OASIS_M1830: 03
ENTERING_EXITING_HOME: NEEDS ASSISTANCE

## 2025-02-09 ASSESSMENT — ENCOUNTER SYMPTOMS
APPETITE LEVEL: FAIR
PAIN: 1
LOWEST PAIN SEVERITY IN PAST 24 HOURS: 3/10
PAIN SEVERITY GOAL: 3/10
HIGHEST PAIN SEVERITY IN PAST 24 HOURS: 3/10

## 2025-02-10 LAB
ATRIAL RATE: 69 BPM
DIASTOLIC BLOOD PRESSURE: 79 MMHG
P AXIS: 75 DEGREES
P OFFSET: 188 MS
P ONSET: 139 MS
PR INTERVAL: 160 MS
Q ONSET: 219 MS
QRS COUNT: 11 BEATS
QRS DURATION: 90 MS
QT INTERVAL: 398 MS
QTC CALCULATION(BAZETT): 426 MS
QTC FREDERICIA: 417 MS
R AXIS: 85 DEGREES
SYSTOLIC BLOOD PRESSURE: 166 MMHG
T AXIS: 27 DEGREES
T OFFSET: 418 MS
VENTRICULAR RATE: 69 BPM

## 2025-02-11 ENCOUNTER — HOME CARE VISIT (OUTPATIENT)
Dept: HOME HEALTH SERVICES | Facility: HOME HEALTH | Age: 75
End: 2025-02-11
Payer: MEDICARE

## 2025-02-13 ENCOUNTER — TELEMEDICINE CLINICAL SUPPORT (OUTPATIENT)
Dept: CARDIAC SURGERY | Facility: CLINIC | Age: 75
End: 2025-02-13
Payer: COMMERCIAL

## 2025-02-13 LAB
ATRIAL RATE: 80 BPM
P AXIS: 56 DEGREES
P OFFSET: 191 MS
P ONSET: 123 MS
PR INTERVAL: 190 MS
Q ONSET: 218 MS
QRS COUNT: 13 BEATS
QRS DURATION: 80 MS
QT INTERVAL: 362 MS
QTC CALCULATION(BAZETT): 417 MS
QTC FREDERICIA: 398 MS
R AXIS: 15 DEGREES
T AXIS: 39 DEGREES
T OFFSET: 399 MS
VENTRICULAR RATE: 80 BPM

## 2025-02-14 LAB
ATRIAL RATE: 67 BPM
P AXIS: 76 DEGREES
P OFFSET: 187 MS
P ONSET: 119 MS
PR INTERVAL: 200 MS
Q ONSET: 219 MS
QRS COUNT: 11 BEATS
QRS DURATION: 80 MS
QT INTERVAL: 402 MS
QTC CALCULATION(BAZETT): 424 MS
QTC FREDERICIA: 417 MS
R AXIS: 53 DEGREES
T AXIS: 51 DEGREES
T OFFSET: 420 MS
VENTRICULAR RATE: 67 BPM

## 2025-02-17 ENCOUNTER — HOME CARE VISIT (OUTPATIENT)
Dept: HOME HEALTH SERVICES | Facility: HOME HEALTH | Age: 75
End: 2025-02-17
Payer: MEDICARE

## 2025-02-17 NOTE — CASE COMMUNICATION
Patient refused SN home visit. Pt requesting SN visit every two weeks.  Discharge is scheduled for 3/3/25.

## 2025-02-18 ENCOUNTER — HOME CARE VISIT (OUTPATIENT)
Dept: HOME HEALTH SERVICES | Facility: HOME HEALTH | Age: 75
End: 2025-02-18
Payer: MEDICARE

## 2025-02-19 ENCOUNTER — HOME CARE VISIT (OUTPATIENT)
Dept: HOME HEALTH SERVICES | Facility: HOME HEALTH | Age: 75
End: 2025-02-19
Payer: MEDICARE

## 2025-02-20 NOTE — PROGRESS NOTES
Virtual visit with Jessy Garcia this morning who states feeling  stronger.  She denies chest discomfort, dyspnea, palpitations,   S/p CABG with Dr. Gonzalez. 1/24     Topics discussed:     [place]: On interview, patient is alert, conversant, in no acute distress, appears to be in good spirits.   Respiratory: denies sob.   Surgical incision: PERRY, well approximated, C/D/I. Sternal stability, denies pain, MITT.   Medications: taking all medications as directed.   Homecare: weekly visits thus far.   Physical activity: slowly increasing her activity at home.   On average: 120/70/78/denies wt gain. : Her edema has resolved with furosemide.  Questions: Discussed cardiac rehab, driving privileges and dietary guidelines.     Patient's main complaint is that: no questions or concerns.      Patient encouraged to call me with any questions/concerns or if needs arise. Patient verbalized understanding.         Rosalina Gregory RN

## 2025-02-24 ENCOUNTER — HOME CARE VISIT (OUTPATIENT)
Dept: HOME HEALTH SERVICES | Facility: HOME HEALTH | Age: 75
End: 2025-02-24
Payer: MEDICARE

## 2025-02-24 ENCOUNTER — OFFICE VISIT (OUTPATIENT)
Dept: CARDIAC SURGERY | Facility: CLINIC | Age: 75
End: 2025-02-24
Payer: COMMERCIAL

## 2025-02-24 ENCOUNTER — APPOINTMENT (OUTPATIENT)
Dept: CARDIAC SURGERY | Facility: CLINIC | Age: 75
End: 2025-02-24
Payer: COMMERCIAL

## 2025-02-24 ENCOUNTER — HOSPITAL ENCOUNTER (OUTPATIENT)
Dept: RADIOLOGY | Facility: HOSPITAL | Age: 75
Discharge: HOME | End: 2025-02-24
Payer: COMMERCIAL

## 2025-02-24 VITALS
HEIGHT: 60 IN | WEIGHT: 185.8 LBS | HEART RATE: 80 BPM | TEMPERATURE: 98.1 F | DIASTOLIC BLOOD PRESSURE: 85 MMHG | OXYGEN SATURATION: 94 % | BODY MASS INDEX: 36.48 KG/M2 | SYSTOLIC BLOOD PRESSURE: 144 MMHG

## 2025-02-24 DIAGNOSIS — Z95.1 S/P CABG X 4: ICD-10-CM

## 2025-02-24 DIAGNOSIS — Z95.1 S/P CABG (CORONARY ARTERY BYPASS GRAFT): Primary | ICD-10-CM

## 2025-02-24 PROCEDURE — 3077F SYST BP >= 140 MM HG: CPT | Performed by: THORACIC SURGERY (CARDIOTHORACIC VASCULAR SURGERY)

## 2025-02-24 PROCEDURE — 1111F DSCHRG MED/CURRENT MED MERGE: CPT | Performed by: THORACIC SURGERY (CARDIOTHORACIC VASCULAR SURGERY)

## 2025-02-24 PROCEDURE — 1125F AMNT PAIN NOTED PAIN PRSNT: CPT | Performed by: THORACIC SURGERY (CARDIOTHORACIC VASCULAR SURGERY)

## 2025-02-24 PROCEDURE — 1157F ADVNC CARE PLAN IN RCRD: CPT | Performed by: THORACIC SURGERY (CARDIOTHORACIC VASCULAR SURGERY)

## 2025-02-24 PROCEDURE — 99211 OFF/OP EST MAY X REQ PHY/QHP: CPT | Mod: 25 | Performed by: THORACIC SURGERY (CARDIOTHORACIC VASCULAR SURGERY)

## 2025-02-24 PROCEDURE — 1159F MED LIST DOCD IN RCRD: CPT | Performed by: THORACIC SURGERY (CARDIOTHORACIC VASCULAR SURGERY)

## 2025-02-24 PROCEDURE — 3079F DIAST BP 80-89 MM HG: CPT | Performed by: THORACIC SURGERY (CARDIOTHORACIC VASCULAR SURGERY)

## 2025-02-24 PROCEDURE — 71046 X-RAY EXAM CHEST 2 VIEWS: CPT | Performed by: RADIOLOGY

## 2025-02-24 PROCEDURE — 71046 X-RAY EXAM CHEST 2 VIEWS: CPT

## 2025-02-24 PROCEDURE — 1036F TOBACCO NON-USER: CPT | Performed by: THORACIC SURGERY (CARDIOTHORACIC VASCULAR SURGERY)

## 2025-02-24 ASSESSMENT — PAIN SCALES - GENERAL: PAINLEVEL_OUTOF10: 3

## 2025-02-24 ASSESSMENT — ENCOUNTER SYMPTOMS
LOSS OF SENSATION IN FEET: 0
OCCASIONAL FEELINGS OF UNSTEADINESS: 0
DEPRESSION: 1

## 2025-02-24 NOTE — LETTER
February 24, 2025     Kris Palacio MD  [address] [facility] [address]  [facility] [address]    Patient: Jessy Garcia   YOB: 1950   Date of Visit: 2/24/2025       Dear Dr. Kris Palacio MD:    Thank you for referring Jessy Garcia to me for evaluation. Below are my notes for this consultation.  If you have questions, please do not hesitate to call me. I look forward to following your patient along with you.       Sincerely,     Selwyn Hunt MD      CC: Latrice Patel, APRN-CNP  ______________________________________________________________________________________    Chief Complaint  POST OP Evaluation    HPI:   Ms. Jessy Garcia is a 75 y.o. female, who presents for post-operative evaluation.   She is now 1 month out from her operation, and is recovering nicely. Her post-op course was uncomplicated.  She has resumed normal activities and her appetite is still down, but she is eating.  She has no chest pain, no shortness of breath and denies palpitations, dizziness, or syncope. She has some soreness of her mammary harvest site.     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    Social History     Socioeconomic History   • Marital status: Unknown     Spouse name:  Not on file   • Number of children: Not on file   • Years of education: Not on file   • Highest education level: Not on file   Occupational History   • Not on file   Tobacco Use   • Smoking status: Never     Passive exposure: Never   • Smokeless tobacco: Never   Vaping Use   • Vaping status: Never Used   Substance and Sexual Activity   • Alcohol use: Never   • Drug use: Never   • Sexual activity: Defer   Other Topics Concern   • Not on file   Social History Narrative   • Not on file     Social Drivers of Health     Financial Resource Strain: Low Risk  (1/20/2025)    Overall Financial Resource Strain (CARDIA)    • Difficulty of Paying Living Expenses: Not very hard   Food Insecurity: No Food Insecurity (1/20/2025)    Hunger Vital Sign    • Worried About Running Out of Food in the Last Year: Never true    • Ran Out of Food in the Last Year: Never true   Transportation Needs: No Transportation Needs (2/9/2025)    OASIS : Transportation    • Lack of Transportation (Medical): No    • Lack of Transportation (Non-Medical): No    • Patient Unable or Declines to Respond: No   Physical Activity: Sufficiently Active (8/11/2022)    Received from [facility]    Exercise Vital Sign    • Days of Exercise per Week: 5 days    • Minutes of Exercise per Session: 60 min   Stress: Stress Concern Present (8/11/2022)    Received from [facility]    [employer] - Occupational Stress Questionnaire    • Feeling of Stress : To some extent   Social Connections: Feeling Socially Integrated (2/9/2025)    OASIS : Social Isolation    • Frequency of experiencing loneliness or isolation: Never   Intimate Partner Violence: Not At Risk (1/20/2025)    Humiliation, Afraid, Rape, and Kick questionnaire    • Fear of Current or Ex-Partner: No    • Emotionally Abused: No    • Physically Abused: No    • Sexually Abused: No   Housing Stability: Low Risk  (1/20/2025)    Housing Stability Vital Sign    • Unable  to Pay for Housing in the Last Year: No    • Number of Times Moved in the Last Year: 1    • Homeless in the Last Year: No       Allergies   Allergen Reactions   • Atorvastatin Calcium Myalgia   • House Dust Mite Itching and Other     Congestion    • Pollen Extracts Itching and Other     Congestion    • Rosuvastatin Calcium Myalgia       Outpatient Encounter Medications as of 2/24/2025   Medication Sig Dispense Refill   • acetaminophen (Tylenol) 325 mg tablet Take 2 tablets (650 mg) by mouth every 6 hours if needed for moderate pain (4 - 6).     • aspirin 81 mg chewable tablet Chew and swallow 1 tablet (81 mg) by mouth once daily. 30 tablet 11   • clopidogrel (Plavix) 75 mg tablet Take 1 tablet (75 mg) by mouth once daily. 30 tablet 11   • ferrous sulfate, 325 mg ferrous sulfate, tablet Take 1 tablet (325 mg) by mouth once daily with breakfast. 30 tablet 0   • furosemide (Lasix) 20 mg tablet Take 1 tablet (20 mg) by mouth once daily. 30 tablet 0   • gabapentin (Neurontin) 100 mg capsule Take 1 capsule (100 mg) by mouth once daily at bedtime.     • lovastatin (Mevacor) 40 mg tablet Take 1 tablet (40 mg) by mouth once daily.     • methocarbamol (Robaxin) 500 mg tablet Take 1 tablet (500 mg) by mouth every 8 hours if needed for muscle spasms. 20 tablet 0   • metoprolol tartrate (Lopressor) 25 mg tablet Take 1 tablet (25 mg) by mouth 2 times a day. 60 tablet 3   • Synthroid 50 mcg tablet Take 1 tablet (50 mcg) by mouth once daily.       No facility-administered encounter medications on file as of 2/24/2025.       Physical Exam    Encounter Date: 01/20/25   Electrocardiogram 12-lead PRN ACS symptoms   Result Value    Systolic blood pressure 166    Diastolic blood pressure 79    Ventricular Rate 69    Atrial Rate 69    MI Interval 160    QRS Duration 90    QT Interval 398    QTC Calculation(Bazett) 426    P Axis 75    R Axis 85    T Axis 27    QRS Count 11    Q Onset 219    P Onset 139    P Offset 188    T Offset 418     QTC Fredericia 417    Narrative    Normal sinus rhythm  Septal infarct , age undetermined  Abnormal ECG  No previous ECGs available  Confirmed by Kris Palacio ([id number]) on 2/10/2025 10:35:54 AM     CXR: 2/24/25:  No effusion, no PTX.  Sternal plates and wires are in place and intact.  Heart borders are clear.     Assessment and Plan:    Ms. Jessy Garcia is a 75 y.o. female, who is recovering well after surgery.  She underwent OPCAB x 4 with Dr. Gonzalez on 1/24/25.  I have released them from sternal precautions and referred them for cardiopulmonary rehab.  I have encouraged them to slowly return to normal activities, but refrain from heavy lifting for a full 12 weeks after surgery.  She will continue cardiovascular management with their cardiologist.  I am always happy to see them for any reason, but have released them from the practice.

## 2025-02-24 NOTE — PROGRESS NOTES
Chief Complaint  POST OP Evaluation    HPI:   Ms. Jessy Garcia is a 75 y.o. female, who presents for post-operative evaluation.   She is now 1 month out from her operation, and is recovering nicely. Her post-op course was uncomplicated.  She has resumed normal activities and her appetite is still down, but she is eating.  She has no chest pain, no shortness of breath and denies palpitations, dizziness, or syncope. She has some soreness of her mammary harvest site.     Past Medical History:   Diagnosis Date    ACS (acute coronary syndrome) (Multi) 01/19/2025    Asymptomatic carotid artery stenosis 02/03/2025    Atherosclerotic cardiovascular disease 02/03/2025    Essential hypertension 04/22/2009    Mixed hyperlipidemia 04/22/2009    had tried zocor and crestor and had muscle aches  tolerates pravastatin      NSTEMI (non-ST elevated myocardial infarction) (Multi) 01/19/2025    Telogen effluvium 04/20/2022       Past Surgical History:   Procedure Laterality Date    CARDIAC CATHETERIZATION N/A 1/21/2025    Procedure: Left Heart Cath;  Surgeon: Kris Palacio MD;  Location: [facility] Cardiac Cath Lab;  Service: Cardiovascular;  Laterality: N/A;    OTHER SURGICAL HISTORY  03/01/2017    Endarterectomy Carotid Artery       No family history on file.    Social History     Socioeconomic History    Marital status: Unknown     Spouse name: Not on file    Number of children: Not on file    Years of education: Not on file    Highest education level: Not on file   Occupational History    Not on file   Tobacco Use    Smoking status: Never     Passive exposure: Never    Smokeless tobacco: Never   Vaping Use    Vaping status: Never Used   Substance and Sexual Activity    Alcohol use: Never    Drug use: Never    Sexual activity: Defer   Other Topics Concern    Not on file   Social History Narrative    Not on file     Social Drivers of Health     Financial Resource Strain: Low Risk  (1/20/2025)    Overall Financial Resource Strain  (CARDIA)     Difficulty of Paying Living Expenses: Not very hard   Food Insecurity: No Food Insecurity (1/20/2025)    Hunger Vital Sign     Worried About Running Out of Food in the Last Year: Never true     Ran Out of Food in the Last Year: Never true   Transportation Needs: No Transportation Needs (2/9/2025)    OASIS : Transportation     Lack of Transportation (Medical): No     Lack of Transportation (Non-Medical): No     Patient Unable or Declines to Respond: No   Physical Activity: Sufficiently Active (8/11/2022)    Received from [facility]    Exercise Vital Sign     Days of Exercise per Week: 5 days     Minutes of Exercise per Session: 60 min   Stress: Stress Concern Present (8/11/2022)    Received from [facility]    [employer] - Occupational Stress Questionnaire     Feeling of Stress : To some extent   Social Connections: Feeling Socially Integrated (2/9/2025)    OASIS : Social Isolation     Frequency of experiencing loneliness or isolation: Never   Intimate Partner Violence: Not At Risk (1/20/2025)    Humiliation, Afraid, Rape, and Kick questionnaire     Fear of Current or Ex-Partner: No     Emotionally Abused: No     Physically Abused: No     Sexually Abused: No   Housing Stability: Low Risk  (1/20/2025)    Housing Stability Vital Sign     Unable to Pay for Housing in the Last Year: No     Number of Times Moved in the Last Year: 1     Homeless in the Last Year: No       Allergies   Allergen Reactions    Atorvastatin Calcium Myalgia    House Dust Mite Itching and Other     Congestion     Pollen Extracts Itching and Other     Congestion     Rosuvastatin Calcium Myalgia       Outpatient Encounter Medications as of 2/24/2025   Medication Sig Dispense Refill    acetaminophen (Tylenol) 325 mg tablet Take 2 tablets (650 mg) by mouth every 6 hours if needed for moderate pain (4 - 6).      aspirin 81 mg chewable tablet Chew and swallow 1 tablet (81 mg) by mouth once  daily. 30 tablet 11    clopidogrel (Plavix) 75 mg tablet Take 1 tablet (75 mg) by mouth once daily. 30 tablet 11    ferrous sulfate, 325 mg ferrous sulfate, tablet Take 1 tablet (325 mg) by mouth once daily with breakfast. 30 tablet 0    furosemide (Lasix) 20 mg tablet Take 1 tablet (20 mg) by mouth once daily. 30 tablet 0    gabapentin (Neurontin) 100 mg capsule Take 1 capsule (100 mg) by mouth once daily at bedtime.      lovastatin (Mevacor) 40 mg tablet Take 1 tablet (40 mg) by mouth once daily.      methocarbamol (Robaxin) 500 mg tablet Take 1 tablet (500 mg) by mouth every 8 hours if needed for muscle spasms. 20 tablet 0    metoprolol tartrate (Lopressor) 25 mg tablet Take 1 tablet (25 mg) by mouth 2 times a day. 60 tablet 3    Synthroid 50 mcg tablet Take 1 tablet (50 mcg) by mouth once daily.       No facility-administered encounter medications on file as of 2/24/2025.       Physical Exam    Encounter Date: 01/20/25   Electrocardiogram 12-lead PRN ACS symptoms   Result Value    Systolic blood pressure 166    Diastolic blood pressure 79    Ventricular Rate 69    Atrial Rate 69    AZ Interval 160    QRS Duration 90    QT Interval 398    QTC Calculation(Bazett) 426    P Axis 75    R Axis 85    T Axis 27    QRS Count 11    Q Onset 219    P Onset 139    P Offset 188    T Offset 418    QTC Fredericia 417    Narrative    Normal sinus rhythm  Septal infarct , age undetermined  Abnormal ECG  No previous ECGs available  Confirmed by Kris Palacio ([id number]) on 2/10/2025 10:35:54 AM     CXR: 2/24/25:  No effusion, no PTX.  Sternal plates and wires are in place and intact.  Heart borders are clear.     Assessment and Plan:    Ms. Jessy Garcia is a 75 y.o. female, who is recovering well after surgery.  She underwent OPCAB x 4 with Dr. Gonzalez on 1/24/25.  I have released them from sternal precautions and referred them for cardiopulmonary rehab.  I have encouraged them to slowly return to normal activities, but refrain from  heavy lifting for a full 12 weeks after surgery.  She will continue cardiovascular management with their cardiologist.  I am always happy to see them for any reason, but have released them from the practice.

## 2025-02-25 ENCOUNTER — APPOINTMENT (OUTPATIENT)
Dept: CARDIAC SURGERY | Facility: CLINIC | Age: 75
End: 2025-02-25
Payer: COMMERCIAL

## 2025-03-04 ENCOUNTER — HOME CARE VISIT (OUTPATIENT)
Dept: HOME HEALTH SERVICES | Facility: HOME HEALTH | Age: 75
End: 2025-03-04
Payer: MEDICARE

## 2025-03-04 ASSESSMENT — ACTIVITIES OF DAILY LIVING (ADL)
HOME_HEALTH_OASIS: 00
OASIS_M1830: 00

## (undated) DEVICE — CONNECTOR, 1/2 X 1/2, STRAIGHT, STERILE

## (undated) DEVICE — SUTURE, VICRYL, 4-0, 18 IN, PS2, UNDYED

## (undated) DEVICE — TUBING, CLEAR N-COND, 5MM X 10, LF

## (undated) DEVICE — CATHETER, EXPO, MODEL-D, 6FR FL3.5

## (undated) DEVICE — SUTURE, VICRYL 2-0, TAPER POINT, CT-1 UNDYED 27 INCH

## (undated) DEVICE — APPLICATOR, CHLORAPREP, W/ORANGE TINT, 26ML

## (undated) DEVICE — GUIDEWIRE, INQWIRE, 3MM J, .035 X 210CM, FIXED

## (undated) DEVICE — CATHETER, EXPO, MODEL-D, 6FR FR4

## (undated) DEVICE — SPONGE, HEMOSTATIC, GELATIN, SURGIFOAM, 8 X 12.5 CM X 10 MM

## (undated) DEVICE — CANNULA, VESSEL, ONE WAY FLOW VALVE, BLUNT TIP, 3 MM X 6.4 CM

## (undated) DEVICE — SOLUTION, IRRIGATION, USP, SODIUM CHLORIDE 0.9%, 3000 ML, BAG

## (undated) DEVICE — CANNULA, AORTIC, ROOT, STANDARD, FLANGE, RADIOPAQUE TIP, W/FLOW-GUARD, 9 FR X 14 CM

## (undated) DEVICE — SPONGE, LAP, XRAY DECT, 18IN X 18IN, W/LOOP, STERILE

## (undated) DEVICE — DRAIN, CHANNEL, BLAKE, HUBLESS, ROUND, 28FR

## (undated) DEVICE — CANNULA, ARTERIOTOMY, BULB TIP, 2 MM X 3.2 CM

## (undated) DEVICE — SUTURE, PROLENE 4-0, TAPER POINT, SH-1 BLUE 30 INCH

## (undated) DEVICE — SOLUTION, INJECTION, USP, SODIUM CHLORIDE 0.9%, .9, NACL, 1000 ML, BAG

## (undated) DEVICE — HEMOSTAT, ABSORABABLE, SURGICEL SNOW, 2 X 4, LF

## (undated) DEVICE — BLADE, SAW STERNUM, STERILE

## (undated) DEVICE — TRAY, SURESTEP, URINE METER, 14FR, SILICONE

## (undated) DEVICE — CASSETTE, BLOOD, PLEGIC SET

## (undated) DEVICE — Device

## (undated) DEVICE — CATHETER KIT, CENTRAL VENOUS, MULTI-LUMEN, 7 FR, 16 CM

## (undated) DEVICE — SOLUTION, INJECTION, USP, PLASMALYTE A, PH 7.4, TYPE 1, 1000 ML, BAG

## (undated) DEVICE — SENSOR, FLOTRAC, 84IN 213CM

## (undated) DEVICE — TUBE SET, PNEUMOLAR HEATED, SMOKE EVACU, HIGH-FLOW

## (undated) DEVICE — CLIP, LIGATING, W/ADHESIVE, WIDE SLOT, SMALL, TITANIUM

## (undated) DEVICE — CLIP, LIGATING, W/ADHESIVE PAD, MEDIUM, TITANIUM

## (undated) DEVICE — SUTURE, PROLENE, 6-0, 30 IN, C-1, CV-11, BLUE

## (undated) DEVICE — EXTENSION SET, IV, SOFT, 20 IN

## (undated) DEVICE — DRAPE, INSTRUMENT, W/POUCH, STERI DRAPE, 7 X 11 IN, DISPOSABLE, STERILE

## (undated) DEVICE — DRESSING, ISLAND, TELFA, 4 X 5 IN

## (undated) DEVICE — DRAPE, SHEET, UTILITY, NON ABSORBENT, 18 X 26 IN, LF

## (undated) DEVICE — CANNULA, EOPA 20F W/O GUIDEWIRE

## (undated) DEVICE — KIT, EXTENSION LINE, PRESSURE, RETROGRADE

## (undated) DEVICE — FILTER, IV, BLOOD, MICROAGGREGATE, 40 MIC, RBC TRANSFUSION

## (undated) DEVICE — SUTURE, VICRYL, 0, 18 IN, CT-1, UNDYED

## (undated) DEVICE — HANDLE COVER, LIGHT, RIGID, DISP, LF

## (undated) DEVICE — SUTURE, STEEL, 7, 18 IN, CCS, SILVER

## (undated) DEVICE — BANDAGE, ELASTIC, ACE, ACE, DOUBLE LENGTH, 6 X 550 IN, LF

## (undated) DEVICE — CLIP, SPRING, BULLDOG, FOGARTY, SOFT JAW, 6 MM, LF

## (undated) DEVICE — PACING CABLE, EXTENSION, 12 FT BEIGE, DISPOSABLE

## (undated) DEVICE — GEL, ECOVUE, ULTRASOUND, 20GRAM, STERILE

## (undated) DEVICE — RETRACTOR, SUTURE, HOLDING, INSERT, OCTOBASE, DISPOSABLE

## (undated) DEVICE — DRESSING, MEDIPORE W/PAD, 3-1/2X13-3/4 IN

## (undated) DEVICE — OXYGENATOR FX 25, W/HR, ARTERIAL FILTER

## (undated) DEVICE — INTRODUCER, GLIDESHEATH SLENDER A-KIT, 6FR 10CM

## (undated) DEVICE — CANNULA, RETROGRADE, 15FR, W/AUTO INFLATE

## (undated) DEVICE — BANDAGE, ELASTIC, PREMIUM, SELF-CLOSE, 4 IN X 5.5 YD, STERILE

## (undated) DEVICE — CANNULA, VENOUS 2 STAGE 32/40

## (undated) DEVICE — CONNECTOR, 3/8 X 1/4, STRAIGHT, STERILE

## (undated) DEVICE — PUNCH, AORTIC 4MM, BULLET TIP

## (undated) DEVICE — BATTERY, VARISPEED

## (undated) DEVICE — CATHETER KIT, RADIAL ARTLINE, 20G X 1 3/4

## (undated) DEVICE — SUTURE, PROLENE, 7-0, 30 IN, BV1, DA, BLUE

## (undated) DEVICE — SUTURE, PROLENE, 3-0, 36 IN, SH, DA, BLUE

## (undated) DEVICE — CATHETER, ANGIO, IMPULSE, MPA2, 5 FR X 100 CM

## (undated) DEVICE — KIT, FAST START

## (undated) DEVICE — SYRINGE, 20 CC, LUER LOCK

## (undated) DEVICE — DEVICE, ENDOSCOPIC VESSEL HARVESTING, SAPHENA VENAPAX

## (undated) DEVICE — SUTURE, NUROLON, 0, 18 IN, CT1, DETACHABLE, MULTIPACK, BLACK

## (undated) DEVICE — KNIFE, MICRO, 15 DEG BLADE AND TIP, DISP

## (undated) DEVICE — INSERT, CLAMP, SURGICAL, SOFT/TRACTION, STEALTH, 5 MM

## (undated) DEVICE — COLLECTION UNIT, DRAINAGE, THORACIC, SINGLE TUBE, DRY SUCTION, ATS COMPATIBLE, OASIS 3600, LF

## (undated) DEVICE — CANNULA, MULTIPLE PERFUSION SET, 15"

## (undated) DEVICE — DRAPE, SHEET, VI, W/BETADINE

## (undated) DEVICE — SUTURE, ETHIBOND, XTRA, 30 IN, 0, CT-1, GREEN

## (undated) DEVICE — DEPRESSOR, TONGUE, 6 IN, WOOD, STERILE, LF

## (undated) DEVICE — SUTURE, PROLENE, 5-0, 36 IN, RB1, DA, BLUE

## (undated) DEVICE — TUBING PACK, OXYGENATOR, ADULT

## (undated) DEVICE — SEALANT, HEMOSTATIC, FLOSEAL, 10 ML

## (undated) DEVICE — CUFF, TOURNIQUET, 18 X 4, DUAL PORT/SNGL BLADDER, DISP, LF

## (undated) DEVICE — PADS, STOCKERT MOUNTING F/LOW LEVEL SENSOR II

## (undated) DEVICE — TUBING, SUCTION, CONNECTING, NON-CONDUCTIVE, SURE GRIP CONNECTORS, 3/16 X 18 IN, PVC

## (undated) DEVICE — BAG, DECANTER

## (undated) DEVICE — ELECTROSURGICAL, CLEANER, LECTROBRASIVE

## (undated) DEVICE — BASIN KIT, SINGLE

## (undated) DEVICE — SENSOR, OXYGEN, CEREBRAL, SOMASENSOR, ADULT

## (undated) DEVICE — REAGENT, GEM HEMOCHRON 100, GACT+

## (undated) DEVICE — TR BAND, RADIAL COMPRESSION, STANDARD, 24CM

## (undated) DEVICE — BLOWER MISTER KIT, CLEARVIEW, MALLEABLE SHAFT, W/TUBING, 16.5 CM

## (undated) DEVICE — DRAPE, SHEET, CARDIOVASCULAR, ANTIMICROBIAL, W/ANESTHESIA SCREEN, IOBAN 2, STERI DRAPE, 107 X 133 IN, DISPOSABLE, FABRIC, BLUE, STERILE